# Patient Record
Sex: MALE | Race: WHITE | Employment: OTHER | URBAN - METROPOLITAN AREA
[De-identification: names, ages, dates, MRNs, and addresses within clinical notes are randomized per-mention and may not be internally consistent; named-entity substitution may affect disease eponyms.]

---

## 2017-01-05 ENCOUNTER — TELEPHONE (OUTPATIENT)
Dept: INTERNAL MEDICINE CLINIC | Facility: CLINIC | Age: 77
End: 2017-01-05

## 2017-01-05 DIAGNOSIS — Z85.828 PERSONAL HISTORY OF OTHER MALIGNANT NEOPLASM OF SKIN: ICD-10-CM

## 2017-01-05 DIAGNOSIS — I10 ESSENTIAL HYPERTENSION, BENIGN: Primary | ICD-10-CM

## 2017-01-05 DIAGNOSIS — I48.92 ATRIAL FLUTTER, PAROXYSMAL (HCC): ICD-10-CM

## 2017-01-05 DIAGNOSIS — E78.2 MIXED HYPERLIPIDEMIA: ICD-10-CM

## 2017-01-05 RX ORDER — METOPROLOL SUCCINATE 50 MG/1
TABLET, EXTENDED RELEASE ORAL
Qty: 90 TABLET | Refills: 1 | Status: SHIPPED | OUTPATIENT
Start: 2017-01-05 | End: 2017-06-29

## 2017-01-05 RX ORDER — ATORVASTATIN CALCIUM 10 MG/1
TABLET, FILM COATED ORAL
Qty: 90 TABLET | Refills: 1 | Status: SHIPPED | OUTPATIENT
Start: 2017-01-05 | End: 2017-06-29

## 2017-01-05 RX ORDER — RAMIPRIL 10 MG/1
CAPSULE ORAL
Qty: 90 CAPSULE | Refills: 1 | Status: SHIPPED | OUTPATIENT
Start: 2017-01-05 | End: 2017-06-29

## 2017-01-05 NOTE — TELEPHONE ENCOUNTER
Spoke with patient to clarify request as we sent a 90 day supply to his mail order. Patient states his insurance has changed and he never filled those. New rx for medications sent to [facility].    Advised to complete blood work prior to his appt in M

## 2017-05-10 ENCOUNTER — LAB ENCOUNTER (OUTPATIENT)
Dept: LAB | Age: 77
End: 2017-05-10
Attending: INTERNAL MEDICINE
Payer: MEDICARE

## 2017-05-10 DIAGNOSIS — I10 ESSENTIAL HYPERTENSION, BENIGN: ICD-10-CM

## 2017-05-10 DIAGNOSIS — Z85.828 PERSONAL HISTORY OF OTHER MALIGNANT NEOPLASM OF SKIN: ICD-10-CM

## 2017-05-10 DIAGNOSIS — I48.92 ATRIAL FLUTTER, PAROXYSMAL (HCC): ICD-10-CM

## 2017-05-10 DIAGNOSIS — E78.2 MIXED HYPERLIPIDEMIA: ICD-10-CM

## 2017-05-10 PROCEDURE — 36415 COLL VENOUS BLD VENIPUNCTURE: CPT | Performed by: INTERNAL MEDICINE

## 2017-05-15 ENCOUNTER — OFFICE VISIT (OUTPATIENT)
Dept: INTERNAL MEDICINE CLINIC | Facility: CLINIC | Age: 77
End: 2017-05-15

## 2017-05-15 ENCOUNTER — TELEPHONE (OUTPATIENT)
Dept: INTERNAL MEDICINE CLINIC | Facility: CLINIC | Age: 77
End: 2017-05-15

## 2017-05-15 VITALS
WEIGHT: 225 LBS | HEIGHT: 74.5 IN | HEART RATE: 76 BPM | BODY MASS INDEX: 28.57 KG/M2 | TEMPERATURE: 98 F | DIASTOLIC BLOOD PRESSURE: 70 MMHG | OXYGEN SATURATION: 98 % | SYSTOLIC BLOOD PRESSURE: 134 MMHG | RESPIRATION RATE: 16 BRPM

## 2017-05-15 DIAGNOSIS — S39.012A LUMBAR STRAIN, INITIAL ENCOUNTER: ICD-10-CM

## 2017-05-15 DIAGNOSIS — E78.2 MIXED HYPERLIPIDEMIA: ICD-10-CM

## 2017-05-15 DIAGNOSIS — I10 ESSENTIAL HYPERTENSION, BENIGN: Primary | ICD-10-CM

## 2017-05-15 PROCEDURE — 99214 OFFICE O/P EST MOD 30 MIN: CPT | Performed by: INTERNAL MEDICINE

## 2017-05-15 RX ORDER — CYCLOBENZAPRINE HCL 10 MG
10 TABLET ORAL NIGHTLY
Qty: 7 TABLET | Refills: 0 | Status: SHIPPED | OUTPATIENT
Start: 2017-05-15 | End: 2017-11-13

## 2017-05-15 NOTE — PATIENT INSTRUCTIONS
Facts About Dietary Fat     Olive oil is a good source of unsaturated fat. Eating less saturated and trans fat is one of the best things you can do for your heart. Start by finding out which fats are better to use.  Then always try to use as little \" © 9862-4514 [address], [address] [place]. All rights reserved. This information is not intended as a substitute for professional medical care. Always follow your healthcare professional's instructions.

## 2017-05-15 NOTE — PROGRESS NOTES
HPI:    Patient ID: Adelina Rebollar is a 64year old male. HTN  This is a chronic problem. The current episode started more than 1 year ago. The problem is controlled.  Pertinent negatives include no anxiety, blurred vision, chest pain, headaches, ma Respiratory: Negative for cough and shortness of breath. Cardiovascular: Negative for chest pain, palpitations, orthopnea and PND. Gastrointestinal: Negative for abdominal pain, diarrhea, constipation and bowel incontinence.    Genitourinary: Ruben Rivera and normal heart sounds. No murmur heard. Pulmonary/Chest: Effort normal and breath sounds normal. No respiratory distress. He has no wheezes. He has no rales. Abdominal: Soft. Bowel sounds are normal. He exhibits no distension.  There is no tendernes

## 2017-05-16 NOTE — TELEPHONE ENCOUNTER
Per Robbie Script: Cyclobenzaprine APPROVED from 2/15/17 to 5/16/18. Member DB:[id number].     Pharmacy informed and they confirmed script went through

## 2017-05-23 ENCOUNTER — TELEPHONE (OUTPATIENT)
Dept: INTERNAL MEDICINE CLINIC | Facility: CLINIC | Age: 77
End: 2017-05-23

## 2017-05-23 DIAGNOSIS — E78.2 MIXED HYPERLIPIDEMIA: Primary | ICD-10-CM

## 2017-05-23 DIAGNOSIS — I10 ESSENTIAL HYPERTENSION, BENIGN: ICD-10-CM

## 2017-06-12 ENCOUNTER — MED REC SCAN ONLY (OUTPATIENT)
Dept: INTERNAL MEDICINE CLINIC | Facility: CLINIC | Age: 77
End: 2017-06-12

## 2017-06-29 DIAGNOSIS — E78.2 MIXED HYPERLIPIDEMIA: ICD-10-CM

## 2017-06-29 DIAGNOSIS — I10 ESSENTIAL HYPERTENSION, BENIGN: ICD-10-CM

## 2017-06-29 DIAGNOSIS — Z85.828 PERSONAL HISTORY OF OTHER MALIGNANT NEOPLASM OF SKIN: ICD-10-CM

## 2017-06-29 DIAGNOSIS — I48.92 ATRIAL FLUTTER, PAROXYSMAL (HCC): ICD-10-CM

## 2017-06-29 RX ORDER — ATORVASTATIN CALCIUM 10 MG/1
TABLET, FILM COATED ORAL
Qty: 90 TABLET | Refills: 1 | Status: SHIPPED | OUTPATIENT
Start: 2017-06-29 | End: 2017-11-13

## 2017-06-29 RX ORDER — RAMIPRIL 10 MG/1
CAPSULE ORAL
Qty: 90 CAPSULE | Refills: 1 | Status: SHIPPED | OUTPATIENT
Start: 2017-06-29 | End: 2018-01-02

## 2017-06-29 RX ORDER — METOPROLOL SUCCINATE 50 MG/1
TABLET, EXTENDED RELEASE ORAL
Qty: 90 TABLET | Refills: 1 | Status: SHIPPED | OUTPATIENT
Start: 2017-06-29 | End: 2017-11-13

## 2017-06-29 RX ORDER — RAMIPRIL 10 MG/1
CAPSULE ORAL
Qty: 90 CAPSULE | Refills: 1 | Status: SHIPPED | OUTPATIENT
Start: 2017-06-29 | End: 2017-11-13

## 2017-06-29 RX ORDER — METOPROLOL SUCCINATE 50 MG/1
TABLET, EXTENDED RELEASE ORAL
Qty: 90 TABLET | Refills: 1 | Status: SHIPPED | OUTPATIENT
Start: 2017-06-29 | End: 2018-01-02

## 2017-06-29 RX ORDER — ATORVASTATIN CALCIUM 10 MG/1
TABLET, FILM COATED ORAL
Qty: 90 TABLET | Refills: 1 | Status: SHIPPED | OUTPATIENT
Start: 2017-06-29 | End: 2018-01-02

## 2017-06-29 NOTE — TELEPHONE ENCOUNTER
Refill 3 different medications -  Atorvastatin 10mg  Metoprolol 50mg  Ramipril 10mg   90 day supply of all 3  Send to [facility] on file

## 2017-10-19 PROBLEM — R35.1 NOCTURIA: Status: ACTIVE | Noted: 2017-10-19

## 2017-10-19 PROBLEM — R91.8 LUNG NODULES: Status: ACTIVE | Noted: 2017-10-19

## 2017-10-19 PROBLEM — N40.1 BPH WITH OBSTRUCTION/LOWER URINARY TRACT SYMPTOMS: Status: ACTIVE | Noted: 2017-10-19

## 2017-10-19 PROBLEM — N13.8 BPH WITH OBSTRUCTION/LOWER URINARY TRACT SYMPTOMS: Status: ACTIVE | Noted: 2017-10-19

## 2017-11-01 ENCOUNTER — LAB ENCOUNTER (OUTPATIENT)
Dept: LAB | Age: 77
End: 2017-11-01
Attending: INTERNAL MEDICINE
Payer: MEDICARE

## 2017-11-01 DIAGNOSIS — I10 ESSENTIAL (PRIMARY) HYPERTENSION: ICD-10-CM

## 2017-11-01 DIAGNOSIS — E78.2 MIXED HYPERLIPIDEMIA: Primary | ICD-10-CM

## 2017-11-01 PROCEDURE — 81001 URINALYSIS AUTO W/SCOPE: CPT

## 2017-11-01 PROCEDURE — 80061 LIPID PANEL: CPT

## 2017-11-01 PROCEDURE — 80053 COMPREHEN METABOLIC PANEL: CPT

## 2017-11-14 ENCOUNTER — OFFICE VISIT (OUTPATIENT)
Dept: INTERNAL MEDICINE CLINIC | Facility: CLINIC | Age: 77
End: 2017-11-14

## 2017-11-14 VITALS
RESPIRATION RATE: 16 BRPM | TEMPERATURE: 98 F | HEIGHT: 74.5 IN | OXYGEN SATURATION: 95 % | DIASTOLIC BLOOD PRESSURE: 80 MMHG | HEART RATE: 76 BPM | WEIGHT: 219.5 LBS | BODY MASS INDEX: 27.87 KG/M2 | SYSTOLIC BLOOD PRESSURE: 130 MMHG

## 2017-11-14 DIAGNOSIS — Z23 NEED FOR INFLUENZA VACCINATION: ICD-10-CM

## 2017-11-14 DIAGNOSIS — Z00.00 ENCOUNTER FOR ANNUAL HEALTH EXAMINATION: ICD-10-CM

## 2017-11-14 DIAGNOSIS — I25.10 ATHEROSCLEROSIS OF NATIVE CORONARY ARTERY OF NATIVE HEART WITHOUT ANGINA PECTORIS: ICD-10-CM

## 2017-11-14 DIAGNOSIS — I35.8 AORTIC VALVE SCLEROSIS: ICD-10-CM

## 2017-11-14 DIAGNOSIS — Z85.528 HISTORY OF RENAL CELL CARCINOMA: ICD-10-CM

## 2017-11-14 DIAGNOSIS — N40.1 BPH WITH OBSTRUCTION/LOWER URINARY TRACT SYMPTOMS: ICD-10-CM

## 2017-11-14 DIAGNOSIS — I48.92 ATRIAL FLUTTER, PAROXYSMAL (HCC): ICD-10-CM

## 2017-11-14 DIAGNOSIS — Z13.39 SCREENING FOR ALCOHOL PROBLEM: ICD-10-CM

## 2017-11-14 DIAGNOSIS — Z13.6 SCREENING FOR CARDIOVASCULAR CONDITION: ICD-10-CM

## 2017-11-14 DIAGNOSIS — N13.8 BPH WITH OBSTRUCTION/LOWER URINARY TRACT SYMPTOMS: ICD-10-CM

## 2017-11-14 DIAGNOSIS — R91.8 LUNG NODULES: ICD-10-CM

## 2017-11-14 DIAGNOSIS — I10 ESSENTIAL HYPERTENSION, BENIGN: ICD-10-CM

## 2017-11-14 DIAGNOSIS — E78.2 MIXED HYPERLIPIDEMIA: ICD-10-CM

## 2017-11-14 DIAGNOSIS — Z00.00 PHYSICAL EXAM, ANNUAL: Primary | ICD-10-CM

## 2017-11-14 DIAGNOSIS — Z13.31 DEPRESSION SCREENING: ICD-10-CM

## 2017-11-14 PROBLEM — R35.1 NOCTURIA: Status: RESOLVED | Noted: 2017-10-19 | Resolved: 2017-11-14

## 2017-11-14 PROCEDURE — G0008 ADMIN INFLUENZA VIRUS VAC: HCPCS | Performed by: INTERNAL MEDICINE

## 2017-11-14 PROCEDURE — G0446 INTENS BEHAVE THER CARDIO DX: HCPCS | Performed by: INTERNAL MEDICINE

## 2017-11-14 PROCEDURE — 90653 IIV ADJUVANT VACCINE IM: CPT | Performed by: INTERNAL MEDICINE

## 2017-11-14 PROCEDURE — G0444 DEPRESSION SCREEN ANNUAL: HCPCS | Performed by: INTERNAL MEDICINE

## 2017-11-14 PROCEDURE — G0439 PPPS, SUBSEQ VISIT: HCPCS | Performed by: INTERNAL MEDICINE

## 2017-11-14 NOTE — PROGRESS NOTES
HPI:   John Paul Bell is a 76year old male who presents for a Medicare Subsequent Annual Wellness visit (Pt already had Initial Annual Wellness).     HPI:  No complaints  Tolerating meds  Denies cp or sob  No abnormal bleed/bruising    PAST MEDICAL, S SL Tab Dissolve 1 tablet under the tongue every 5 minutes as  needed for chest pain   Cholecalciferol (VITAMIN D3) 2000 UNITS Oral Tab Take  by mouth daily. Coral Calcium 1000 (390 CA) MG Oral Tab Take 2,000 mg by mouth daily.    aspirin 81 MG Oral Tab Ta denies abdominal pain, denies heartburn  : 1 per night nocturia, no complaint of urinary incontinence  MUSCULOSKELETAL: denies back pain  NEURO: denies headaches  PSYCHE: denies depression or anxiety  HEMATOLOGIC: denies hx of anemia  ENDOCRINE: denies t deferred   Rectal: deferred   Extremities: Extremities normal, atraumatic, no cyanosis or edema   Pulses: 2+ and symmetric   Skin: Skin color, texture, turgor normal, no rashes or lesions   Lymph nodes: Cervical, supraclavicular nodes normal   Neurologic: Long term (current) use of anticoagulants- stable. Cont xarelto     Atherosclerosis of native coronary artery of native heart without angina pectoris- stable.  Cont control of CAD risk factors     History of renal cell carcinoma- per urology service     Mix Assessment          Have you fallen in the last 12 months?: 0-No                                        Fall/Risk Scorin          Depression Screening (PHQ-2/PHQ-9): Over the LAST 2 WEEKS   Little interest or pleasure in doing things (over the last two results found for: FOB No flowsheet data found. Glaucoma Screening      Ophthalmology Visit Annually: Diabetics, FHx Glaucoma, AA>50, > 65 No flowsheet data found.     Prostate Cancer Screening      PSA  Annually There are no preventive care suzi Value   04/16/2015 83     LDL Cholesterol (mg/dL)   Date Value   11/01/2017 84    No flowsheet data found. Dilated Eye exam  Annually No flowsheet data found. No flowsheet data found.     COPD      Spirometry Testing Annually Spirometry date:  No flows

## 2017-11-14 NOTE — PATIENT INSTRUCTIONS
Eating Heart-Healthy Foods  Eating has a big impact on your heart health. In fact, eating healthier can improve several of your heart risks at once. For instance, it helps you manage weight, cholesterol, and blood pressure.  Here are ideas to help you romeo Aim to make these foods staples of your diet. If you have diabetes, you may have different recommendations than what is listed here:  · Fruits and vegetable provide plenty of nutrients without a lot of calories.  At meals, fill half your plate with these fo · Choose ingredients that spice up your food without adding calories, fat, or sodium. Try these items: horseradish, hot sauce, lemon, mustard, nonfat salad dressings, and vinegar.  For salt-free herbs and spices, try basil, cilantro, cinnamon, pepper, and r 04/16/2015 151     Cholesterol, Total (mg/dL)   Date Value   11/01/2017 154     TRIGLYCERIDES (mg/dL)   Date Value   04/11/2014 125     Triglycerides (mg/dL)   Date Value   11/01/2017 102        EKG - covered if needed at Welcome to Medicare, and non-scree -FLU VAC NO PRSV 4 KAITLYNN 3 YRS+   Orders placed or performed in visit on 10/06/15  -FLU VAC NO PRSV 4 KIATLYNN 3 YRS+   Orders placed or performed in visit on 10/17/14  -FLU VACC [address] ANTIG   Orders placed or performed in visit on 10/11/13  -INFLUENZA VI A link to the [employer]. This site has a lot of good information including definitions of the different types of Advance Directives.  It also has the State forms available on it's website for anyone to review and print using their home

## 2017-11-20 ENCOUNTER — MED REC SCAN ONLY (OUTPATIENT)
Dept: INTERNAL MEDICINE CLINIC | Facility: CLINIC | Age: 77
End: 2017-11-20

## 2017-12-01 ENCOUNTER — TELEPHONE (OUTPATIENT)
Dept: INTERNAL MEDICINE CLINIC | Facility: CLINIC | Age: 77
End: 2017-12-01

## 2017-12-01 NOTE — TELEPHONE ENCOUNTER
Spoke with patient and he states he has been in PT for his left leg (thigh) and left hip. He has noticed some improvement but he's not 100% better yet.  States his therapist recommends xray of left hip and leg to be completed in a couple of weeks after 2-4

## 2017-12-01 NOTE — TELEPHONE ENCOUNTER
Pt is having therapy at [facility] on his leg, they suggest that he gets an xray , not progressing that well, athletic states that they would give a letter if needed, pt would like a call back

## 2017-12-15 ENCOUNTER — TELEPHONE (OUTPATIENT)
Dept: INTERNAL MEDICINE CLINIC | Facility: CLINIC | Age: 77
End: 2017-12-15

## 2017-12-15 NOTE — TELEPHONE ENCOUNTER
Per Dr. Jackson Prim refer to:   Reuben Dumas M.D. Dermatology   [address] [address] [address]   Phone: [phone]     Pt given referral information.

## 2017-12-15 NOTE — TELEPHONE ENCOUNTER
Pt left a voicemail saying Dr Cira Rahamn was recommended as a Dermatologist for him and his family, but he is leaving his practice so the pt wants to know who else Dr Mindy Villalobos would recommend?

## 2018-01-02 DIAGNOSIS — I10 ESSENTIAL HYPERTENSION, BENIGN: ICD-10-CM

## 2018-01-02 DIAGNOSIS — Z85.828 PERSONAL HISTORY OF OTHER MALIGNANT NEOPLASM OF SKIN: ICD-10-CM

## 2018-01-02 DIAGNOSIS — E78.2 MIXED HYPERLIPIDEMIA: ICD-10-CM

## 2018-01-02 DIAGNOSIS — I48.92 ATRIAL FLUTTER, PAROXYSMAL (HCC): ICD-10-CM

## 2018-01-02 RX ORDER — METOPROLOL SUCCINATE 50 MG/1
TABLET, EXTENDED RELEASE ORAL
Qty: 90 TABLET | Refills: 1 | Status: SHIPPED | OUTPATIENT
Start: 2018-01-02 | End: 2018-06-25

## 2018-01-02 RX ORDER — ATORVASTATIN CALCIUM 10 MG/1
TABLET, FILM COATED ORAL
Qty: 90 TABLET | Refills: 1 | Status: SHIPPED | OUTPATIENT
Start: 2018-01-02 | End: 2018-06-25

## 2018-01-02 RX ORDER — RAMIPRIL 10 MG/1
CAPSULE ORAL
Qty: 90 CAPSULE | Refills: 1 | Status: SHIPPED | OUTPATIENT
Start: 2018-01-02 | End: 2018-06-25

## 2018-01-09 ENCOUNTER — TELEPHONE (OUTPATIENT)
Dept: INTERNAL MEDICINE CLINIC | Facility: CLINIC | Age: 78
End: 2018-01-09

## 2018-01-09 DIAGNOSIS — M25.552 LEFT HIP PAIN: Primary | ICD-10-CM

## 2018-01-09 DIAGNOSIS — M79.652 LEFT THIGH PAIN: ICD-10-CM

## 2018-01-09 NOTE — TELEPHONE ENCOUNTER
Left message for pt to give the office a call back to discuss the PT recommendations in more detail.

## 2018-01-09 NOTE — TELEPHONE ENCOUNTER
VOICEMAIL: pt states he has been going to [address] for about 5w now, and they would like him to have an xray to see what is going on, call pt back

## 2018-01-09 NOTE — TELEPHONE ENCOUNTER
Pt reports has been going to PT for the last 5 weeks for left thigh pain. The PT has recommended having x-rays done of the area (the left thigh and the left hip) to better decide if the pain is possible radiating from the left hip.      Will discuss the rec

## 2018-01-11 ENCOUNTER — HOSPITAL ENCOUNTER (OUTPATIENT)
Dept: GENERAL RADIOLOGY | Age: 78
Discharge: HOME | End: 2018-01-11
Attending: INTERNAL MEDICINE
Payer: MEDICARE

## 2018-01-11 DIAGNOSIS — M79.652 LEFT THIGH PAIN: ICD-10-CM

## 2018-01-11 PROCEDURE — 73502 X-RAY EXAM HIP UNI 2-3 VIEWS: CPT | Performed by: INTERNAL MEDICINE

## 2018-05-08 ENCOUNTER — OFFICE VISIT (OUTPATIENT)
Dept: INTERNAL MEDICINE CLINIC | Facility: CLINIC | Age: 78
End: 2018-05-08

## 2018-05-08 ENCOUNTER — LAB ENCOUNTER (OUTPATIENT)
Dept: LAB | Age: 78
End: 2018-05-08
Attending: INTERNAL MEDICINE
Payer: MEDICARE

## 2018-05-08 VITALS
WEIGHT: 226 LBS | TEMPERATURE: 98 F | HEART RATE: 66 BPM | SYSTOLIC BLOOD PRESSURE: 110 MMHG | BODY MASS INDEX: 28.7 KG/M2 | RESPIRATION RATE: 16 BRPM | OXYGEN SATURATION: 96 % | DIASTOLIC BLOOD PRESSURE: 70 MMHG | HEIGHT: 74.5 IN

## 2018-05-08 DIAGNOSIS — I10 ESSENTIAL HYPERTENSION, BENIGN: ICD-10-CM

## 2018-05-08 DIAGNOSIS — E78.2 MIXED HYPERLIPIDEMIA: ICD-10-CM

## 2018-05-08 DIAGNOSIS — S39.012A STRAIN OF LUMBAR REGION, INITIAL ENCOUNTER: ICD-10-CM

## 2018-05-08 DIAGNOSIS — I10 ESSENTIAL HYPERTENSION, BENIGN: Primary | ICD-10-CM

## 2018-05-08 PROCEDURE — 36415 COLL VENOUS BLD VENIPUNCTURE: CPT

## 2018-05-08 PROCEDURE — 80053 COMPREHEN METABOLIC PANEL: CPT

## 2018-05-08 PROCEDURE — 99214 OFFICE O/P EST MOD 30 MIN: CPT | Performed by: INTERNAL MEDICINE

## 2018-05-08 PROCEDURE — 80061 LIPID PANEL: CPT

## 2018-05-08 RX ORDER — NAPROXEN 500 MG/1
500 TABLET ORAL 2 TIMES DAILY WITH MEALS
Qty: 30 TABLET | Refills: 0 | Status: SHIPPED | OUTPATIENT
Start: 2018-05-08 | End: 2018-05-30

## 2018-05-08 NOTE — PROGRESS NOTES
HPI:    Patient ID: Ronald Martínez is a 76year old male. HPI  HPI:   Ronald Martínez is a 76year old male who is here for complaints of back pain. Pain is located at right low back. Pain is described as dull, aching. Severity is moderate.  Katie Magdaleno Difficult intubation 7/14/93    Dr. Gema Santiago gave letter - will bring with him   • DVT (deep venous thrombosis) ([facility] 4/7/2014   • Earache     secondary to TMJ   • HEART ATTACK    • HIGH BLOOD PRESSURE    • HIGH CHOLESTEROL    • Hypercholesterolemia 4/7/2014 joints are affected    EXAM:   /70   Pulse 66   Temp (!) 97.5 °F (36.4 °C) (Oral)   Resp 16   Ht 74.5\"   Wt 226 lb   SpO2 96%   BMI 28.63 kg/m²    GENERAL: well developed, well nourished,in no apparent distress  SKIN: no rashes,no suspicious lesions Disp:  Rfl:    B Complex Vitamins (B COMPLEX 50 OR) Take  by mouth. Disp:  Rfl:    Omega-3 Fatty Acids (FISH OIL OR) Take 1,200 mg by mouth daily.  Disp:  Rfl:      Allergies:No Known Allergies   PHYSICAL EXAM:   Physical Exam           ASSESSMENT/PLAN:   S

## 2018-05-08 NOTE — PATIENT INSTRUCTIONS
Back Sprain or Strain    Injury to the muscles (strain) or ligaments (sprain) around the spine can be troubling.  Injury may occur after a sudden forceful twisting or bending force such as in a car accident, after a simple awkward movement, or after lifti · You can alternate the ice and heat. Talk with your healthcare provider to find out the best treatment or therapy for your back pain. · Therapeutic massage will help relax the back muscles without stretching them. · Be aware of safe lifting methods.  Do · Pain gets worse or spreads to your arms or legs  · Weakness or numbness in one or both arms or legs  · Numbness in the groin or genital area  Date Last Reviewed: 6/1/2016  © 3054-7606 [employer] [address]. [address] [facility], [address] [place] [address].

## 2018-06-20 ENCOUNTER — HOSPITAL ENCOUNTER (OUTPATIENT)
Dept: CV DIAGNOSTICS | Facility: HOSPITAL | Age: 78
Discharge: HOME | End: 2018-06-20
Attending: INTERNAL MEDICINE
Payer: MEDICARE

## 2018-06-20 DIAGNOSIS — Z79.01 LONG TERM (CURRENT) USE OF ANTICOAGULANTS: ICD-10-CM

## 2018-06-20 DIAGNOSIS — I10 ESSENTIAL HYPERTENSION, BENIGN: ICD-10-CM

## 2018-06-20 DIAGNOSIS — I25.10 ATHEROSCLEROSIS OF NATIVE CORONARY ARTERY OF NATIVE HEART WITHOUT ANGINA PECTORIS: ICD-10-CM

## 2018-06-20 DIAGNOSIS — I48.92 ATRIAL FLUTTER, PAROXYSMAL (HCC): ICD-10-CM

## 2018-06-20 DIAGNOSIS — I35.8 AORTIC VALVE SCLEROSIS: ICD-10-CM

## 2018-06-20 PROCEDURE — 93306 TTE W/DOPPLER COMPLETE: CPT | Performed by: INTERNAL MEDICINE

## 2018-06-25 DIAGNOSIS — Z85.828 PERSONAL HISTORY OF OTHER MALIGNANT NEOPLASM OF SKIN: ICD-10-CM

## 2018-06-25 DIAGNOSIS — I10 ESSENTIAL HYPERTENSION, BENIGN: ICD-10-CM

## 2018-06-25 DIAGNOSIS — I48.92 ATRIAL FLUTTER, PAROXYSMAL (HCC): ICD-10-CM

## 2018-06-25 DIAGNOSIS — E78.2 MIXED HYPERLIPIDEMIA: ICD-10-CM

## 2018-06-25 RX ORDER — ATORVASTATIN CALCIUM 10 MG/1
TABLET, FILM COATED ORAL
Qty: 90 TABLET | Refills: 1 | Status: SHIPPED | OUTPATIENT
Start: 2018-06-25 | End: 2018-12-20

## 2018-06-25 RX ORDER — METOPROLOL SUCCINATE 50 MG/1
TABLET, EXTENDED RELEASE ORAL
Qty: 90 TABLET | Refills: 1 | Status: SHIPPED | OUTPATIENT
Start: 2018-06-25 | End: 2018-12-20

## 2018-06-25 RX ORDER — RAMIPRIL 10 MG/1
CAPSULE ORAL
Qty: 90 CAPSULE | Refills: 1 | Status: ON HOLD | OUTPATIENT
Start: 2018-06-25 | End: 2018-09-29

## 2018-08-13 ENCOUNTER — TELEPHONE (OUTPATIENT)
Dept: INTERNAL MEDICINE CLINIC | Facility: CLINIC | Age: 78
End: 2018-08-13

## 2018-08-13 NOTE — TELEPHONE ENCOUNTER
Left message for Peg to give the office a call back. [facility] is requested a letter stating Dr. Cezar Basilio agrees with the test results but the results were not sent for review.

## 2018-08-13 NOTE — TELEPHONE ENCOUNTER
Fax came in requesting Dr. Jeannette Casiano approval on the state of the patient.  Put request in Triage Tray

## 2018-08-17 NOTE — TELEPHONE ENCOUNTER
2 calls with no return call from Peg. Fax sent informing Peg we need the detailed evaluation before letter can be written.

## 2018-09-25 ENCOUNTER — HOSPITAL ENCOUNTER (INPATIENT)
Facility: HOSPITAL | Age: 78
LOS: 2 days | Discharge: HOME | DRG: 699 | End: 2018-09-29
Attending: EMERGENCY MEDICINE | Admitting: HOSPITALIST
Payer: MEDICARE

## 2018-09-25 ENCOUNTER — APPOINTMENT (OUTPATIENT)
Dept: CT IMAGING | Facility: HOSPITAL | Age: 78
DRG: 699 | End: 2018-09-25
Attending: EMERGENCY MEDICINE
Payer: MEDICARE

## 2018-09-25 ENCOUNTER — APPOINTMENT (OUTPATIENT)
Dept: GENERAL RADIOLOGY | Facility: HOSPITAL | Age: 78
DRG: 699 | End: 2018-09-25
Attending: EMERGENCY MEDICINE
Payer: MEDICARE

## 2018-09-25 DIAGNOSIS — I16.9 HYPERTENSIVE CRISIS: Primary | ICD-10-CM

## 2018-09-25 DIAGNOSIS — R41.0 CONFUSION: ICD-10-CM

## 2018-09-25 PROCEDURE — 99223 1ST HOSP IP/OBS HIGH 75: CPT | Performed by: HOSPITALIST

## 2018-09-25 PROCEDURE — 71045 X-RAY EXAM CHEST 1 VIEW: CPT | Performed by: EMERGENCY MEDICINE

## 2018-09-25 PROCEDURE — 70450 CT HEAD/BRAIN W/O DYE: CPT | Performed by: EMERGENCY MEDICINE

## 2018-09-25 RX ORDER — HYDRALAZINE HYDROCHLORIDE 20 MG/ML
10 INJECTION INTRAMUSCULAR; INTRAVENOUS ONCE
Status: COMPLETED | OUTPATIENT
Start: 2018-09-25 | End: 2018-09-25

## 2018-09-26 ENCOUNTER — APPOINTMENT (OUTPATIENT)
Dept: GENERAL RADIOLOGY | Facility: HOSPITAL | Age: 78
DRG: 699 | End: 2018-09-26
Attending: HOSPITALIST
Payer: MEDICARE

## 2018-09-26 ENCOUNTER — APPOINTMENT (OUTPATIENT)
Dept: CV DIAGNOSTICS | Facility: HOSPITAL | Age: 78
DRG: 699 | End: 2018-09-26
Attending: HOSPITALIST
Payer: MEDICARE

## 2018-09-26 ENCOUNTER — APPOINTMENT (OUTPATIENT)
Dept: CT IMAGING | Facility: HOSPITAL | Age: 78
DRG: 699 | End: 2018-09-26
Attending: INTERNAL MEDICINE
Payer: MEDICARE

## 2018-09-26 PROBLEM — R41.0 CONFUSION: Status: ACTIVE | Noted: 2018-09-26

## 2018-09-26 PROCEDURE — 99232 SBSQ HOSP IP/OBS MODERATE 35: CPT | Performed by: HOSPITALIST

## 2018-09-26 PROCEDURE — 74178 CT ABD&PLV WO CNTR FLWD CNTR: CPT | Performed by: INTERNAL MEDICINE

## 2018-09-26 PROCEDURE — 73552 X-RAY EXAM OF FEMUR 2/>: CPT | Performed by: HOSPITALIST

## 2018-09-26 PROCEDURE — 99223 1ST HOSP IP/OBS HIGH 75: CPT | Performed by: OTHER

## 2018-09-26 PROCEDURE — 99223 1ST HOSP IP/OBS HIGH 75: CPT | Performed by: INTERNAL MEDICINE

## 2018-09-26 PROCEDURE — 93306 TTE W/DOPPLER COMPLETE: CPT | Performed by: HOSPITALIST

## 2018-09-26 RX ORDER — ASPIRIN 81 MG/1
81 TABLET, CHEWABLE ORAL DAILY
Status: DISCONTINUED | OUTPATIENT
Start: 2018-09-26 | End: 2018-09-26

## 2018-09-26 RX ORDER — FUROSEMIDE 10 MG/ML
20 INJECTION INTRAMUSCULAR; INTRAVENOUS ONCE
Status: COMPLETED | OUTPATIENT
Start: 2018-09-26 | End: 2018-09-26

## 2018-09-26 RX ORDER — ONDANSETRON 2 MG/ML
4 INJECTION INTRAMUSCULAR; INTRAVENOUS EVERY 6 HOURS PRN
Status: DISCONTINUED | OUTPATIENT
Start: 2018-09-26 | End: 2018-09-29

## 2018-09-26 RX ORDER — HYDRALAZINE HYDROCHLORIDE 20 MG/ML
10 INJECTION INTRAMUSCULAR; INTRAVENOUS EVERY 6 HOURS PRN
Status: DISCONTINUED | OUTPATIENT
Start: 2018-09-26 | End: 2018-09-29

## 2018-09-26 RX ORDER — ATORVASTATIN CALCIUM 10 MG/1
10 TABLET, FILM COATED ORAL
Status: DISCONTINUED | OUTPATIENT
Start: 2018-09-26 | End: 2018-09-26

## 2018-09-26 RX ORDER — HYDRALAZINE HYDROCHLORIDE 20 MG/ML
10 INJECTION INTRAMUSCULAR; INTRAVENOUS
Status: DISCONTINUED | OUTPATIENT
Start: 2018-09-26 | End: 2018-09-26

## 2018-09-26 RX ORDER — METOPROLOL SUCCINATE 25 MG/1
25 TABLET, EXTENDED RELEASE ORAL
Status: DISCONTINUED | OUTPATIENT
Start: 2018-09-26 | End: 2018-09-29

## 2018-09-26 RX ORDER — METOCLOPRAMIDE HYDROCHLORIDE 5 MG/ML
10 INJECTION INTRAMUSCULAR; INTRAVENOUS EVERY 8 HOURS PRN
Status: DISCONTINUED | OUTPATIENT
Start: 2018-09-26 | End: 2018-09-29

## 2018-09-26 RX ORDER — METOPROLOL SUCCINATE 50 MG/1
50 TABLET, EXTENDED RELEASE ORAL
Status: DISCONTINUED | OUTPATIENT
Start: 2018-09-26 | End: 2018-09-26

## 2018-09-26 RX ORDER — ASPIRIN 81 MG/1
81 TABLET, CHEWABLE ORAL DAILY
Status: DISCONTINUED | OUTPATIENT
Start: 2018-09-26 | End: 2018-09-29

## 2018-09-26 RX ORDER — RAMIPRIL 5 MG/1
10 CAPSULE ORAL
Status: DISCONTINUED | OUTPATIENT
Start: 2018-09-26 | End: 2018-09-26

## 2018-09-26 RX ORDER — HYDRALAZINE HYDROCHLORIDE 25 MG/1
25 TABLET, FILM COATED ORAL EVERY 8 HOURS SCHEDULED
Status: DISCONTINUED | OUTPATIENT
Start: 2018-09-26 | End: 2018-09-28

## 2018-09-26 RX ORDER — ATORVASTATIN CALCIUM 10 MG/1
10 TABLET, FILM COATED ORAL
Status: DISCONTINUED | OUTPATIENT
Start: 2018-09-26 | End: 2018-09-29

## 2018-09-26 RX ORDER — RAMIPRIL 5 MG/1
10 CAPSULE ORAL
Status: DISCONTINUED | OUTPATIENT
Start: 2018-09-26 | End: 2018-09-28

## 2018-09-26 RX ORDER — ACETAMINOPHEN 325 MG/1
650 TABLET ORAL EVERY 6 HOURS PRN
Status: DISCONTINUED | OUTPATIENT
Start: 2018-09-26 | End: 2018-09-29

## 2018-09-26 RX ORDER — TRAZODONE HYDROCHLORIDE 50 MG/1
50 TABLET ORAL NIGHTLY PRN
Status: DISCONTINUED | OUTPATIENT
Start: 2018-09-26 | End: 2018-09-29

## 2018-09-26 NOTE — ED INITIAL ASSESSMENT (HPI)
Per EMS, patient's wife called 46 for altered mental status. EMS states wife was with patient when at 6:30 pm, she noticed that patient was not responding to her. Currently, patient is A&O x 3; unsure of date, but knows month and year. Speaking clearly.  P

## 2018-09-26 NOTE — PROGRESS NOTES
[facility]PITAL[facility]  Progress Note     Jennyfer Juinor Patient Status:  Observation    1940 MRN [id number]   [facility] 8NE-A Attending Kirsten Mancera, [address] [facility] Day # 0 PCP Anthony Novak MD     Chief Complaint:  Increased sleepiness mg Oral 2x Daily(Beta Blocker)   • ramipril  10 mg Oral Daily   • atorvastatin  10 mg Oral Daily   • aspirin  81 mg Oral Daily       ASSESSMENT / PLAN:     1. Confusion/weakness-unclear etiology;-  1. Neuro to see  2. EEG  3. Ct reviewed  4. A1c 5.5   5.  P biopsy  3.  CT abd/pelvis ordered     Kevin Keys,

## 2018-09-26 NOTE — PROGRESS NOTES
09/26/18 0008 09/26/18 0022 09/26/18 0024   Vital Signs   Pulse 89 85 87   Heart Rate Source Monitor Monitor Monitor   Resp 18 --  --    BP (!) 165/84 (!) 174/88 (!) 172/87   BP Location Right arm Right arm Right arm   BP Method Automatic Automatic Auto

## 2018-09-26 NOTE — PROCEDURES
[address] EEG report    Name: Ky Fox    Date of Study: 9/26/2018      Routine EEG Report    Ordering Physician: Hanna Toussaint MD                           Primary Care Physician: Ning Hylton MD    Technical Asp noted, at times appearing more prominent over the left centroparietal region (C3/P3) and sharply contoured.     Sleep: Sleep stage 1 and 2 were noted, characterized by the appearance of vertex waves and sleep spindles, respectively    Hyperventilation and p

## 2018-09-26 NOTE — ED NOTES
Wife now here. She reports patient started \"acting strange\" at 6:30 pm. She reports patient started to \"do things that did not make sense. \" Wife reports patient has been sleeping more the past couple weeks, but would wake up and seem normal.

## 2018-09-26 NOTE — PROGRESS NOTES
[facility] [facility] Preliminary Note    Ky Fox Patient Status:  Observation    1940 MRN [id number]   [facility] 8NE-A Attending Imani Guillory, [address] [facility] Day # 0 PCP Ning Hylton MD     [address] unspecified type of vessel, native or graft    • Difficult intubation 7/14/93    Dr. Evelyn Kent gave letter - will bring with him   • DVT (deep venous thrombosis) ([facility] 4/7/2014   • Earache     secondary to TMJ   • HEART ATTACK    • HIGH BLOOD PRESSURE    • HIGH 50 MG Oral Tablet 24 Hr, Sig TAKE 0.5 TABLET BY MOUTH TWICE DAILY, Start Date 6/25/18, End Date , Taking? Yes, Authorizing Provider Mani Lord MD    Medication Coral Calcium 1000 (390 CA) MG Oral Tab, Sig Take 2,000 mg by mouth daily. , Start Date , End mg 10 mg Intravenous Q8H PRN   hydrALAzine HCl (APRESOLINE) injection 10 mg 10 mg Intravenous Q3H PRN   Metoprolol Succinate ER (Toprol XL) 24 hr tab 25 mg 25 mg Oral 2x Daily(Beta Blocker)   ramipril (ALTACE) cap 10 mg 10 mg Oral Daily   atorvastatin (LIP further evaluation. 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 Who presented for confusion.  HCT was negative for acute hemorrhage, with age indeter

## 2018-09-26 NOTE — PLAN OF CARE
METABOLIC/FLUID AND ELECTROLYTES - ADULT    • Hemodynamic stability and optimal renal function maintained Progressing        NEUROLOGICAL - ADULT    • Achieves stable or improved neurological status Progressing        Pt is AOX4 (Slow to respond), VSS, NSR

## 2018-09-26 NOTE — H&P
[facility] HOSPITALIST  History and Physical     [address] Way Patient Status:  Emergency    1940 MRN [id number]   Location [address] [facility] Attending Deisy Gibson MD   Hosp Day # 0 PCP Leatha Laird MD     Chief Complain Michelle Echevarria MD at 1404 [facility] OR  8-11-14: SKIN SURGERY; Right      Comment:  Excision for BCC, nodular to right upper back, KS  8/21/14: SKIN SURGERY;  Left      Comment:  MMS for BCC-nod to the L Nasal Supratip  9/16/16: SKIN SURGERY      Comment:  MMS for SCC to the HPI. Physical Exam:    BP (!) 167/95   Pulse 88   Temp 98.8 °F (37.1 °C) (Temporal)   Resp 16   Ht 6' 2\" (1.88 m)   Wt 220 lb (99.8 kg)   SpO2 96%   BMI 28.25 kg/m²   General: No acute distress. Alert and oriented x 3.   HEENT: Normocephalic atrauma to have nephrotic syndrome  5. Hx cad with cabg  6. Left thigh pain-check xray  7. Microscopic hematuria-consider urology consult; do not suspect UTI, but will send for culture just in case, as he has some equivocal urinary symptoms.     Quality:  · DVT Pro

## 2018-09-26 NOTE — CONSULTS
[facility]  Report of Consultation    Selene Back Patient Status:  Observation    1940 MRN [id number]   [facility] 8NE-A Attending Keny Oquendo, [address] [facility] Day # 0 PCP Randy Domínguez MD     Reason for Consultation:  Protein History:  1993: CABG      Comment:  triple bypass  No date: CATH PERCUTANEOUS  TRANSLUMINAL CORONARY ANGIOPLASTY      Comment:  early 1990's   No date: CHOLECYSTECTOMY  9/8/2004, 2011: COLONOSCOPY      Comment:  x2  No date: OPEN HEART SURGERY (PBP)  7/25/ lb 11.2 oz, SpO2 99 %. General: No acute distress. Alert and oriented x 3. HEENT: Moist mucous membranes. EOM-I. PERRL  Neck: No lymphadenopathy. No JVD. No carotid bruits. Respiratory: Clear to auscultation bilaterally. No wheezes. No rhonchi.   Gordo Marinelli 10/02/2017 1.00   09/20/2016 0.93         Imaging:  Reviewed    Impression:  1. Proteinuria- UA w/ sig microscopic hematuria+ proteinuria. Spot ratio as noted above is ~ 6g/g. Corrected. Modest protein gap and calcium is in ULN.   - check monoclonal pro

## 2018-09-26 NOTE — ED PROVIDER NOTES
Patient Seen in: [facility] Emergency Department    History   Patient presents with:  Altered Mental Status (neurologic)    Stated Complaint: ams    HPI    24-year-old male that comes the hospital chief complaint current wife of him being more confu early 1990's   No date: CHOLECYSTECTOMY  9/8/2004, 2011: COLONOSCOPY      Comment:  x2  No date: OPEN HEART SURGERY (PBP)  7/25/2014: ROBOT-ASSISTED LAPAROSCOPIC NEPHRECTOMY; Left      Comment:  Performed by Renetta Kelly MD at [facility] MAIN OR  8-11-14: SKIN URINALYSIS WITH CULTURE REFLEX - Abnormal; Notable for the following components:       Result Value    Clarity Urine Hazy (*)     Blood Urine Large (*)     Protein Urine >=500 (*)     WBC Urine 5-10 (*)     RBC URINE >10 (*)     Mucous Urine 2+ (*)     A ACR (American College of Radiology) NRDR ([address]) which includes the Dose Index Registry. PATIENT STATED HISTORY: (As transcribed by Technologist)  Patient with altered mental status today.     FINDINGS:  Ventricles and sulci are hydrALAzine HCl (APRESOLINE) injection 10 mg (10 mg Intravenous Given 9/25/18 2207)     Patient was observed department with marked improvement in his blood pressure      MDM   Patient be admitted to the hospital for further management for his hypertensi

## 2018-09-27 ENCOUNTER — APPOINTMENT (OUTPATIENT)
Dept: MRI IMAGING | Facility: HOSPITAL | Age: 78
DRG: 699 | End: 2018-09-27
Attending: Other
Payer: MEDICARE

## 2018-09-27 PROCEDURE — 99233 SBSQ HOSP IP/OBS HIGH 50: CPT | Performed by: OTHER

## 2018-09-27 PROCEDURE — 99232 SBSQ HOSP IP/OBS MODERATE 35: CPT | Performed by: HOSPITALIST

## 2018-09-27 PROCEDURE — 99233 SBSQ HOSP IP/OBS HIGH 50: CPT | Performed by: INTERNAL MEDICINE

## 2018-09-27 PROCEDURE — 70553 MRI BRAIN STEM W/O & W/DYE: CPT | Performed by: OTHER

## 2018-09-27 RX ORDER — FUROSEMIDE 10 MG/ML
40 INJECTION INTRAMUSCULAR; INTRAVENOUS
Status: DISCONTINUED | OUTPATIENT
Start: 2018-09-27 | End: 2018-09-28

## 2018-09-27 NOTE — CONSULTS
[facility] Urology   Consultation Note    Blanche Mendoza Patient Status:  Observation    1940 MRN [id number]   [facility] 8NE-A Attending Joyce Lucero, 160[address] [facility] Day # 0 PCP Greer Wong MD     Reason for Consultation: under treated epididymitis   • Unspecified essential hypertension    • Visual impairment     glasses     Past Surgical History:  1993: CABG      Comment:  triple bypass  No date: CATH PERCUTANEOUS  TRANSLUMINAL CORONARY ANGIOPLASTY      Comment:  early 80 are noted in HPI.     Physical Exam:  BP (!) 168/82 (BP Location: Left arm)   Pulse 78   Temp 97.6 °F (36.4 °C) (Oral)   Resp 18   Ht 6' 2\" (1.88 m)   Wt 226 lb (102.5 kg)   SpO2 98%   BMI 29.02 kg/m²   GENERAL: The patient is resting in bed in no acute di There is no pancreatic ductal  dilatation. SPLEEN: Spleen size is normal. There is an increasingly conspicuous 7.6 x 13.2 mm hypodense lesion  at the tip of the spleen.  Also at the tip of the spleen there is a subtle mixed attenuation focus  seen previous lower   pole right kidney. There is a stable 0.5 x 0.5 cm nodule mid pole left kidney. There is minimal central vascular calcification within the left renal hilum. No hydronephrosis. ADRENALS:  No mass or enlargement.     AORTA/VASCULAR:  Marked vascula (current) use of anticoagulants     Atherosclerosis of native coronary artery of native heart without angina pectoris     History of renal cell carcinoma     Mixed hyperlipidemia     Aortic valve sclerosis     BPH with obstruction/lower urinary tract sympt

## 2018-09-27 NOTE — IMAGING NOTE
Spoke to TABITHA KEENE [facility], rn about npo after midnight, hold asa and need pt/inr. Procedure will be tomorrow.

## 2018-09-27 NOTE — CONSULTS
[facility]  Report of Consultation    Alicia Lino Patient Status:  Inpatient    1940 MRN [id number]   [facility] 8NE-A Attending Caridad Shah, 160[address] [facility] Day # 0 PCP Jaycob Amos MD     Reason for [facility] ([facility]) premature arthrosclerotic heart disease     Medications:   Scheduled:   • furosemide  40 mg Intravenous BID (Diuretic)   • Metoprolol Succinate ER  25 mg Oral 2x Daily(Beta Blocker)   • ramipril  10 mg Oral Daily   • atorvastatin  10 mg Oral Daily   • aspi (102.5 kg)  09/27/18 : 226 lb (102.5 kg)  05/30/18 : 224 lb (101.6 kg)          General: Well developed, well nourished male. Pt is in no acute distress. HEENT:   Normocephalic. Atraumatic. Eyes with no scleral icterus. Neck: Supple. No JVD.   Carotid cavity size was increased. The estimated ejection     fraction was 50-55%. Doppler parameters are consistent with abnormal left     ventricular relaxation - grade 1 diastolic dysfunction.   2. Regional wall motion abnormality: Probable moderate hypokinesis

## 2018-09-27 NOTE — PROGRESS NOTES
[facility]  Progress Note     Chaz Round Patient Status:  Observation    1940 MRN [id number]   [facility] 8NE-A Attending Erika Bee, [address] [facility] Day # 0 PCP Jean Padilla MD     Chief Complaint:  Increased sleepiness in [address] [place] [address] Rd.     Medications:   • furosemide  40 mg Intravenous BID (Diuretic)   • Metoprolol Succinate ER  25 mg Oral 2x Daily(Beta Blocker)   • ramipril  10 mg Oral Daily   • atorvastatin  10 mg Oral Daily   • aspirin  81 mg Oral Daily   • hydrALAzine HCl  25

## 2018-09-27 NOTE — CONSULTS
[address] [facility] Initial Consultation    Blanche Mendoza Patient Status:  Observation    1940 MRN [id number]   [facility] 8NE-A Attending Joyce Lucero, [address] [facility] Day # 0 PCP Greer Wong MD     [address] ([facility]    • Bad breath     possibly d/t GERD   • Cholelithiasis    • Coronary atherosclerosis of unspecified type of vessel, native or graft    • Difficult intubation 7/14/93    Dr. Gema Santiago gave letter - will bring with him   • DVT (deep venous thrombo 9/18/18, End Date , Taking? Yes, Authorizing Provider Tommy Segundo MD    Medication METOPROLOL SUCCINATE ER 50 MG Oral Tablet 24 Hr, Sig TAKE 0.5 TABLET BY MOUTH TWICE DAILY, Start Date 6/25/18, End Date , Taking?  Yes, Authorizing Provider Luciano Sawyer PRN   ondansetron HCl (ZOFRAN) injection 4 mg 4 mg Intravenous Q6H PRN   Metoclopramide HCl (REGLAN) injection 10 mg 10 mg Intravenous Q8H PRN   Metoprolol Succinate ER (Toprol XL) 24 hr tab 25 mg 25 mg Oral 2x Daily(Beta Blocker)   ramipril (ALTACE) cap 1 ganglia. If there is clinical concern for acute ischemia/infarction, an MRI of the brain would be recommended for further evaluation. EEG:     Impression:    This EEG is mildly abnormal due to intermittent diffuse slowing, at times noted more prominently

## 2018-09-27 NOTE — PROGRESS NOTES
CARDIOVASCULAR - ADULT     • Maintains optimal cardiac output and hemodynamic stability Progressing     • Absence of cardiac arrhythmias or at baseline Progressing           METABOLIC/FLUID AND ELECTROLYTES - ADULT     • Hemodynamic stability and optimal r

## 2018-09-27 NOTE — PROGRESS NOTES
[address] [facility] Progress Note    Moe Car Patient Status:  Observation    1940 MRN [id number]   [facility] 8NE-A Attending Emerita Swenson, [address] [facility] Day # 0 PCP Yesica Man MD     CC: AMS    Subjective:   Will noted.    Assessment/Plan:  · AMS- possible ischemia vs PRES  · EEG showed mild diffuse slowing and asymmetry; no seizures  · MRI brain pending  · Echo showed worsening of inferior hypokinesis  · HTN- rate better controlled  · Left nephrectomy- urology fol oriented to person, place, time  Speech fluent and conversational though difficult to redirect at times    CN: PERRL, EOMI with no nystagmus, VFF, smile symmetric, sensation intact, tongue and palate midline, SCM intact, otherwise CN 2-12 intact   Motor: 5 Results   Component Value Date    ALT 24 09/25/2018    ALT 28 05/08/2018    ALT 30 11/01/2017          Assessment/Plan:      This is a [de-identified]year old male with a history of atrial flutter, DVT, hypertension, HLD, clear cell carcinoma s/p partial left nephrecto

## 2018-09-27 NOTE — PROGRESS NOTES
[facility]  Progress Note    Ed Passe Patient Status:  Observation    1940 MRN [id number]   [facility] 8NE-A Attending Marielos Rodriguez,    Hosp Day # 0 PCP Angel Luis Post MD     Confused overnight and this am  Refusing m 143   K  4.7  4.0   CL  109  110   CO2  22.0  25.0   BUN  31*  29*   CREATSERUM  1.13  1.05   CA  8.7  7.9*   MG   --   2.1       Recent Labs      09/25/18 2049   ALT  24   AST  35   ALB  2.5*       C3/C4 ; nl  Bence Jurado protein collection ongoing -pt

## 2018-09-28 ENCOUNTER — APPOINTMENT (OUTPATIENT)
Dept: CV DIAGNOSTICS | Facility: HOSPITAL | Age: 78
DRG: 699 | End: 2018-09-28
Attending: INTERNAL MEDICINE
Payer: MEDICARE

## 2018-09-28 ENCOUNTER — APPOINTMENT (OUTPATIENT)
Dept: CT IMAGING | Facility: HOSPITAL | Age: 78
DRG: 699 | End: 2018-09-28
Attending: NURSE PRACTITIONER
Payer: MEDICARE

## 2018-09-28 ENCOUNTER — APPOINTMENT (OUTPATIENT)
Dept: CT IMAGING | Facility: HOSPITAL | Age: 78
DRG: 699 | End: 2018-09-28
Attending: INTERNAL MEDICINE
Payer: MEDICARE

## 2018-09-28 PROCEDURE — 99233 SBSQ HOSP IP/OBS HIGH 50: CPT | Performed by: INTERNAL MEDICINE

## 2018-09-28 PROCEDURE — 99233 SBSQ HOSP IP/OBS HIGH 50: CPT | Performed by: OTHER

## 2018-09-28 PROCEDURE — 70450 CT HEAD/BRAIN W/O DYE: CPT | Performed by: NURSE PRACTITIONER

## 2018-09-28 PROCEDURE — 99232 SBSQ HOSP IP/OBS MODERATE 35: CPT | Performed by: HOSPITALIST

## 2018-09-28 PROCEDURE — 95819 EEG AWAKE AND ASLEEP: CPT | Performed by: OTHER

## 2018-09-28 RX ORDER — NALOXONE HYDROCHLORIDE 0.4 MG/ML
80 INJECTION, SOLUTION INTRAMUSCULAR; INTRAVENOUS; SUBCUTANEOUS AS NEEDED
Status: CANCELLED | OUTPATIENT
Start: 2018-09-28

## 2018-09-28 RX ORDER — FLUMAZENIL 0.1 MG/ML
0.2 INJECTION, SOLUTION INTRAVENOUS AS NEEDED
Status: CANCELLED | OUTPATIENT
Start: 2018-09-28

## 2018-09-28 RX ORDER — SODIUM CHLORIDE 9 MG/ML
INJECTION, SOLUTION INTRAVENOUS CONTINUOUS
Status: CANCELLED | OUTPATIENT
Start: 2018-09-28

## 2018-09-28 RX ORDER — MIDAZOLAM HYDROCHLORIDE 1 MG/ML
1 INJECTION INTRAMUSCULAR; INTRAVENOUS EVERY 5 MIN PRN
Status: CANCELLED | OUTPATIENT
Start: 2018-09-28 | End: 2018-09-28

## 2018-09-28 RX ORDER — HYDRALAZINE HYDROCHLORIDE 50 MG/1
50 TABLET, FILM COATED ORAL EVERY 8 HOURS SCHEDULED
Status: DISCONTINUED | OUTPATIENT
Start: 2018-09-28 | End: 2018-09-29

## 2018-09-28 RX ORDER — RAMIPRIL 5 MG/1
10 CAPSULE ORAL 2 TIMES DAILY
Status: DISCONTINUED | OUTPATIENT
Start: 2018-09-28 | End: 2018-09-29

## 2018-09-28 RX ORDER — FUROSEMIDE 10 MG/ML
40 INJECTION INTRAMUSCULAR; INTRAVENOUS DAILY
Status: DISCONTINUED | OUTPATIENT
Start: 2018-09-29 | End: 2018-09-29

## 2018-09-28 NOTE — IMAGING NOTE
Resting scan and completed,  Rapid response called prior to stress scan. Dr. Tony Coker notified via [phone] phone. Codie notified and asked to call if patient became stable enough to return for stress scan. Nuclear Stress Test not completed.

## 2018-09-28 NOTE — PROGRESS NOTES
[facility]PITAL[facility]  Progress Note     Alicia Lino Patient Status:  Observation    1940 MRN [id number]   [facility] 8NE-A Attending Caridad Shah, [address] [facility] Day # 1 PCP Jaycob Amos MD     Chief Complaint:  Increased sleepiness mg/dL). Recent Labs   Lab  09/25/18   2049  09/27/18   1122  09/28/18   0513   PTP  17.2*  15.2*  14.9*   INR  1.35*  1.15*  1.12*       No results for input(s): TROP, CK in the last 168 hours. Imaging: Imaging data reviewed in Epic.     Court Escamilla

## 2018-09-28 NOTE — PROGRESS NOTES
Cards    FU: CAD    Went for lexiscan nuclear given wall motion abnormality (but previous IMI). Evidently had speech abnormalities. CT negative. Neuro wants repeat EEG. MRI last night negative. Doing better now. No complaints.     No CP, D Speech issues during stress test: Presently close to baseline by my exam.  No musculoskeletal deficits. Speech normal.  Appears fatigued to me. Neurology following. Defer EEG/MRI to them. Follow. 5. HTN: Recent BP elevated.   Increase hydralizine to

## 2018-09-28 NOTE — IMAGING NOTE
Talked to Dr Carole Horn and updated that pt had a code stroke called and was slurring his words when lowering his blood pressure. Per Dr Carole Horn order for biopsy to be cancelled and will revisit this at a later date. RN notified.

## 2018-09-28 NOTE — PROGRESS NOTES
[facility]  Progress Note    Marylene Ghazi Patient Status:  Observation    1940 MRN [id number]   [facility] 8NE-A Attending Darlene Cardona, [address] [facility] [address] Day # 1 PCP Richard Mojica MD     No acute issues overnight  BP higher overn 1.12*       Recent Labs      09/25/18 2049 09/26/18   0511  09/27/18   1001  09/28/18   0513   NA  140  143  141  143   K  4.7  4.0  4.0  3.9   CL  109  110  109  110   CO2  22.0  25.0  23.0  26.0   BUN  31*  29*  25*  23*   CREATSERUM  1.13  1.05  1.0

## 2018-09-28 NOTE — PROGRESS NOTES
CARDIODIAGNOSTICS PRELIMINARY REPORT    Lexiscan Nuclear Stress Test    Patient denied any chest discomfort with Lexiscan infusion. Frequent isolated PVC's noted throughout. Ventricular bigeminy at times. Nuclear images pending.     Carlos Jackson RN ~ Gordo Marinelli

## 2018-09-28 NOTE — PROGRESS NOTES
Patient in cardiodiagnostics department for nuclear stress test. Patient resting in chair after stress portion of exam. At approximately 1100, nuclear technologist noticed patient was not speaking clear and called RN.  Upon assessment, speech was garbled an

## 2018-09-28 NOTE — PROGRESS NOTES
[id number] [facility] Neu[facility] Progress Note    Nnamdi Guzmán Patient Status:  Inpatient    1940 MRN [id number]   [facility] 8NE-A Attending Briseyda Martinez, [address] [facility] Day # 1 PCP Graham Morris MD         Subjective:   Milton ANDRADE Con ramipril  10 mg Oral Daily   • atorvastatin  10 mg Oral Daily   • aspirin  81 mg Oral Daily   • hydrALAzine HCl  25 mg Oral Q8H [address]       Patient Active Problem List:     Essential hypertension, benign     Atrial flutter, paroxysmal ([address]     Long term (cu oriented x 3  CN: PERRL, EOMI, face sensation is intact, face symmetric, tongue midline, shrug intact  MSK: 5/5 strength throughout  Sens: Intact to LT throughout  DTRs: 2+ throughout    A:  This is an [de-identified] y/o male with Aflutter, HTN, DVT and cl;ear cell CA

## 2018-09-28 NOTE — IMAGING NOTE
Spoke to RK Pardo about pt bp elevated requested meds to be given prior to renal biopsy. Char stated understanding.

## 2018-09-28 NOTE — PROCEDURES
[facility] - ELECTROENCEPHALOGRAM (EEG) REPORT  Patient Name:  Miracle Feliciano   MRN / CSN:  [id number] / [id number]   Date of Birth / Age:  6/24/1940 /  [de-identified]year old   Encounter Date:  09/28/18         METHODS:  Twenty-two electrodes were applied according to t epileptiform activity seen and no seizures are recorded.      Report covers  Start 09/28/18 at   End 09/28/18 at [address]:  Angelo Wiggins [address] Neurology

## 2018-09-29 VITALS
WEIGHT: 222 LBS | OXYGEN SATURATION: 93 % | SYSTOLIC BLOOD PRESSURE: 126 MMHG | HEIGHT: 74 IN | HEART RATE: 77 BPM | DIASTOLIC BLOOD PRESSURE: 61 MMHG | TEMPERATURE: 99 F | RESPIRATION RATE: 20 BRPM | BODY MASS INDEX: 28.49 KG/M2

## 2018-09-29 PROCEDURE — 99232 SBSQ HOSP IP/OBS MODERATE 35: CPT | Performed by: INTERNAL MEDICINE

## 2018-09-29 PROCEDURE — 99232 SBSQ HOSP IP/OBS MODERATE 35: CPT | Performed by: NURSE PRACTITIONER

## 2018-09-29 PROCEDURE — 99239 HOSP IP/OBS DSCHRG MGMT >30: CPT | Performed by: HOSPITALIST

## 2018-09-29 RX ORDER — LISINOPRIL 20 MG/1
20 TABLET ORAL 2 TIMES DAILY
Qty: 60 TABLET | Refills: 0 | Status: SHIPPED | OUTPATIENT
Start: 2018-09-29 | End: 2018-09-29

## 2018-09-29 RX ORDER — FUROSEMIDE 40 MG/1
40 TABLET ORAL DAILY
Status: DISCONTINUED | OUTPATIENT
Start: 2018-09-29 | End: 2018-09-29

## 2018-09-29 RX ORDER — FUROSEMIDE 40 MG/1
40 TABLET ORAL DAILY
Qty: 30 TABLET | Refills: 3 | Status: SHIPPED | OUTPATIENT
Start: 2018-09-30 | End: 2018-11-12

## 2018-09-29 RX ORDER — RAMIPRIL 10 MG/1
20 CAPSULE ORAL 2 TIMES DAILY
Qty: 120 CAPSULE | Refills: 0 | Status: SHIPPED | OUTPATIENT
Start: 2018-09-29 | End: 2018-09-29

## 2018-09-29 RX ORDER — HYDRALAZINE HYDROCHLORIDE 50 MG/1
50 TABLET, FILM COATED ORAL EVERY 8 HOURS SCHEDULED
Qty: 90 TABLET | Refills: 0 | Status: SHIPPED | OUTPATIENT
Start: 2018-09-29 | End: 2018-09-29

## 2018-09-29 RX ORDER — HYDRALAZINE HYDROCHLORIDE 50 MG/1
50 TABLET, FILM COATED ORAL EVERY 8 HOURS SCHEDULED
Qty: 90 TABLET | Refills: 0 | Status: SHIPPED | OUTPATIENT
Start: 2018-09-29 | End: 2018-10-03

## 2018-09-29 RX ORDER — LISINOPRIL 20 MG/1
20 TABLET ORAL 2 TIMES DAILY
Qty: 60 TABLET | Refills: 0 | Status: SHIPPED | OUTPATIENT
Start: 2018-09-29 | End: 2018-10-19

## 2018-09-29 RX ORDER — RAMIPRIL 5 MG/1
20 CAPSULE ORAL 2 TIMES DAILY
Status: DISCONTINUED | OUTPATIENT
Start: 2018-09-29 | End: 2018-09-29

## 2018-09-29 RX ORDER — HYDRALAZINE HYDROCHLORIDE 50 MG/1
100 TABLET, FILM COATED ORAL EVERY 8 HOURS SCHEDULED
Status: DISCONTINUED | OUTPATIENT
Start: 2018-09-29 | End: 2018-09-29

## 2018-09-29 NOTE — PLAN OF CARE
Assumed care for patient at 0730 on 9/29. A&O X4. SBP 150s-170s. Ramipril increased. Other VSS on RA. SR - ST. . Lungs clear/ diminished. No pain. SCDs for VTE prophylaxis. Xarelto resumed at discharge. General diet.  Daily weight (2 lb weight

## 2018-09-29 NOTE — PROGRESS NOTES
[facility] Cardiology/[facility]  Progress Note    Radha Amaro Patient Status:  Inpatient    1940 MRN [id number]   [facility] 8NE-A Attending Santi Obrien, [address] [facility] Day # 2 PCP Molly Cheadle, MD        A/P: [de-identified]year old with CAD bruits. Cardiac: Regular rate and rhythm, S1, S2 normal, no murmur, rub or gallop. Lungs: Clear without wheezes, rales, rhonchi. Abdomen: Soft, non-tender. Extremities: Without clubbing, cyanosis or edema. Peripheral pulses present  Neurologic:  Al Metoprolol Succinate ER (Toprol XL) 24 hr tab 25 mg 25 mg Oral 2x Daily(Beta Blocker)   atorvastatin (LIPITOR) tab 10 mg 10 mg Oral Daily   aspirin chewable tab 81 mg 81 mg Oral Daily   hydrALAzine HCl (APRESOLINE) injection 10 mg 10 mg Intravenous Q6H P

## 2018-09-29 NOTE — PROGRESS NOTES
[id number] [facility] Neu[facility] Progress Note    Enmanuel Jasmine Patient Status:  Inpatient    1940 MRN [id number]   [facility] 8NE-A Attending Watson Salgado, [address] [facility] Day # 2 PCP Stef Enriquez MD         Subjective:   Rory ANDRADE Con Daily(Beta Blocker)   • atorvastatin  10 mg Oral Daily   • aspirin  81 mg Oral Daily       Patient Active Problem List:     Essential hypertension, benign     Atrial flutter, paroxysmal (HCC)     Syncope     Long term (current) use of anticoagulants     Co

## 2018-09-29 NOTE — DISCHARGE SUMMARY
[facility] [place] [facility]PITAL[facility]  DISCHARGE SUMMARY     John Paul Bell Patient Status:  Inpatient    1940 MRN [id number]   [facility] 8NE-A Attending No att. providers found   Hosp Day # 2 PCP Juancho Feliciano MD     Date of Admission: 2018 findings and recommendations (brief descriptions):  • None    Lab/Test results pending at Discharge:   · SPEP  · Bence-Jurado protein     Consultants:  • Cardiology  · Nephrology  · Neurology  · Urology     Discharge Medication List:     Discharge Medicatio MG Caps  Commonly known as:  ALTACE              Where to Get Your Medications      Please  your prescriptions at the location directed by your doctor or nurse    Bring a paper prescription for each of these medications  · furosemide 40 MG Tabs  · h

## 2018-09-29 NOTE — PROGRESS NOTES
[facility]  Progress Note     Audrey Yap Patient Status:  Observation    1940 MRN [id number]   [facility] 8NE-A Attending Tom Wright, [address] [facility] Day # 2 PCP Gauri Centeno MD     Chief Complaint:  Increased sleepiness Clearance: 54.4 mL/min (based on SCr of 1.26 mg/dL). Recent Labs   Lab  09/25/18 2049  09/27/18   1122  09/28/18   0513   PTP  17.2*  15.2*  14.9*   INR  1.35*  1.15*  1.12*       No results for input(s): TROP, CK in the last 168 hours.          Imagin

## 2018-09-29 NOTE — PROGRESS NOTES
[facility]  Nephrology Progress Note    Ori Bernal Patient Status:  Inpatient    1940 MRN [id number]   [facility] 8NE-A Attending Gina Bermudez, [address] [facility] Day # 2 PCP Luis Angel Lagunas MD       SUBJECTIVE:  Events of yesterda 7. 9*  8.3  7.7*  8.1*   MG   --   2.1  2.3  2.1  2.2   GLU  77  95  107*  96  104*       Recent Labs   Lab  09/25/18 2049   ALT  24   AST  35   ALB  2.5*       Recent Labs   Lab  09/25/18 2022   PGLU  88       Meds:     Current Facility-Administered M

## 2018-10-01 ENCOUNTER — PATIENT OUTREACH (OUTPATIENT)
Dept: CASE MANAGEMENT | Age: 78
End: 2018-10-01

## 2018-10-01 DIAGNOSIS — Z02.9 ENCOUNTERS FOR ADMINISTRATIVE PURPOSE: ICD-10-CM

## 2018-10-01 PROCEDURE — 1111F DSCHRG MED/CURRENT MED MERGE: CPT

## 2018-10-01 NOTE — PROGRESS NOTES
Initial Post Discharge Follow Up   Discharge Date: 9/29/18  Contact Date: 10/1/2018    Consent Verification:  Assessment Completed With: Spouse: Dc Edwards received per patient?  written  HIPAA Verified?   Yes    Discharge Dx:    Transient confusio daily. Disp: 30 tablet Rfl: 3   ketoconazole 2 % External Cream Apply to AA BID x 2 weeks, hold one week, restart prn Disp: 60 g Rfl: 3   METOPROLOL SUCCINATE ER 50 MG Oral Tablet 24 Hr TAKE 0.5 TABLET BY MOUTH TWICE DAILY Disp: 90 tablet Rfl: 1   ATORVAST fluid restrictions? Winferd All does not recall. NCM explained it is typically 2 Liters per day  Have you noticed any shortness of breath or waking up short of breath?  no  Since discharge do you feel you are urinating more or less?  more    Are you urinating mor appointment with pt:  Yes, NCM confirmed TCM HFU on 10/3/18. Have you made all of your follow up appointments? no    Is there any reason as to why you cannot make your appointments?    No     NCM Reviewed upcoming Specialist Appt with patient     Yes,

## 2018-10-03 ENCOUNTER — OFFICE VISIT (OUTPATIENT)
Dept: INTERNAL MEDICINE CLINIC | Facility: CLINIC | Age: 78
End: 2018-10-03
Payer: MEDICARE

## 2018-10-03 VITALS
BODY MASS INDEX: 28.44 KG/M2 | HEIGHT: 74.5 IN | RESPIRATION RATE: 16 BRPM | TEMPERATURE: 98 F | DIASTOLIC BLOOD PRESSURE: 70 MMHG | SYSTOLIC BLOOD PRESSURE: 152 MMHG | WEIGHT: 224 LBS | HEART RATE: 80 BPM

## 2018-10-03 DIAGNOSIS — N04.9 NEPHROTIC SYNDROME: ICD-10-CM

## 2018-10-03 DIAGNOSIS — I16.9 HYPERTENSIVE CRISIS: ICD-10-CM

## 2018-10-03 DIAGNOSIS — R41.0 CONFUSION: ICD-10-CM

## 2018-10-03 DIAGNOSIS — Z09 HOSPITAL DISCHARGE FOLLOW-UP: Primary | ICD-10-CM

## 2018-10-03 DIAGNOSIS — Z23 NEED FOR INFLUENZA VACCINATION: ICD-10-CM

## 2018-10-03 PROCEDURE — 1111F DSCHRG MED/CURRENT MED MERGE: CPT | Performed by: INTERNAL MEDICINE

## 2018-10-03 PROCEDURE — G0008 ADMIN INFLUENZA VIRUS VAC: HCPCS | Performed by: INTERNAL MEDICINE

## 2018-10-03 PROCEDURE — 90653 IIV ADJUVANT VACCINE IM: CPT | Performed by: INTERNAL MEDICINE

## 2018-10-03 PROCEDURE — 99495 TRANSJ CARE MGMT MOD F2F 14D: CPT | Performed by: INTERNAL MEDICINE

## 2018-10-03 RX ORDER — HYDRALAZINE HYDROCHLORIDE 50 MG/1
TABLET, FILM COATED ORAL
Qty: 90 TABLET | Refills: 0 | COMMUNITY
Start: 2018-10-03 | End: 2018-10-19

## 2018-10-03 NOTE — PROGRESS NOTES
HPI:    Benjamin Pruitt is a [de-identified]year old male here today for hospital follow up.    He was discharged from Inpatient hospital, [facility] to Home   Admission Date: 9/25/18   Discharge Date: 9/29/18  Hospital Discharge Diagnoses (since 9/3/2018) Glomerulonephritis suspected. Patient is planned to undergo renal biopsy in the future. They have not scheduled. Echo revealed wall motion abnormality. Cardiology consulted.  Stress test unable to be completed to to transient speech difficulty during stress Difficult intubation (7/14/93), DVT (deep venous thrombosis) ([facility] [address] (4/7/2014), Earache, HEART ATTACK, HIGH BLOOD PRESSURE, HIGH CHOLESTEROL, Hypercholesterolemia (4/7/2014), Long term (current) use of anticoagulants (4/7/2014), Other and unspecified hyperl nontender, nondistended.  Positive bowel sounds. .  Neurologic: No focal neurological deficits. Musculoskeletal: Moves all extremities. Extremities: No edema or cyanosis. Integument: No rashes or lesions.    Psychiatric: Appropriate mood and affect.

## 2018-10-03 NOTE — PATIENT INSTRUCTIONS
Acute Glomerulonephritis    The kidneys remove waste products and extra fluid from the blood.  Glomerulonephritis (GN) is an inflammation of the kidney that affects how well the kidneys filter blood. This condition may be acute (lasting weeks to months) o Follow up with your healthcare provider, or as advised. The following resources may be helpful:  · American Association of Kidney Patients [phone] www. aakp.org  · [place] [phone] www.kidney. org  Call 911  Call 911 if you have a

## 2018-10-12 ENCOUNTER — OFFICE VISIT (OUTPATIENT)
Dept: NEPHROLOGY | Facility: CLINIC | Age: 78
End: 2018-10-12
Payer: MEDICARE

## 2018-10-12 VITALS — WEIGHT: 217 LBS | BODY MASS INDEX: 27 KG/M2 | SYSTOLIC BLOOD PRESSURE: 126 MMHG | DIASTOLIC BLOOD PRESSURE: 88 MMHG

## 2018-10-12 DIAGNOSIS — I10 ESSENTIAL HYPERTENSION: Primary | ICD-10-CM

## 2018-10-12 DIAGNOSIS — R80.9 PROTEINURIA, UNSPECIFIED TYPE: ICD-10-CM

## 2018-10-12 PROCEDURE — 99215 OFFICE O/P EST HI 40 MIN: CPT | Performed by: INTERNAL MEDICINE

## 2018-10-12 NOTE — PROGRESS NOTES
Nephrology Progress Note      Rafael Toribio is a [de-identified]year old male.     HPI:   Patient presents with:  Proteinuria  Hypertension      [de-identified] y/o male with hx of CAD, GERD, DVT, HTN, hx of Renal cell cancer s/p partial L nephrectomy who seen in hospital las likely from under treated epididymitis   • Unspecified essential hypertension    • Visual impairment     glasses      Past Surgical History:   Procedure Laterality Date   • CABG  1993    triple bypass   • CATH PERCUTANEOUS  TRANSLUMINAL CORONARY ANGIOPL mg by mouth daily. Disp:  Rfl:    Omega-3 Fatty Acids (FISH OIL OR) Take 1,200 mg by mouth daily. Disp:  Rfl:    Cholecalciferol (VITAMIN D3) 2000 UNITS Oral Tab Take  by mouth daily.  Disp:  Rfl:    Coral Calcium 1000 (390 CA) MG Oral Tab Take 2,000 mg by

## 2018-10-18 NOTE — IMAGING NOTE
Pt called back and reported that his last doses of Xarelto and ASA 81mg was 10/16 and that this the direction given to him by Dr Fawn Lucero who was getting approval from pt's cardiologist Dr Carmen Leal.

## 2018-10-19 ENCOUNTER — OFFICE VISIT (OUTPATIENT)
Dept: INTERNAL MEDICINE CLINIC | Facility: CLINIC | Age: 78
End: 2018-10-19
Payer: MEDICARE

## 2018-10-19 VITALS
TEMPERATURE: 98 F | HEIGHT: 74 IN | WEIGHT: 216 LBS | DIASTOLIC BLOOD PRESSURE: 86 MMHG | BODY MASS INDEX: 27.72 KG/M2 | RESPIRATION RATE: 16 BRPM | SYSTOLIC BLOOD PRESSURE: 128 MMHG | HEART RATE: 82 BPM

## 2018-10-19 DIAGNOSIS — I10 ESSENTIAL HYPERTENSION: Primary | ICD-10-CM

## 2018-10-19 PROBLEM — I16.9 HYPERTENSIVE CRISIS: Status: RESOLVED | Noted: 2018-09-25 | Resolved: 2018-10-19

## 2018-10-19 PROBLEM — I71.40 ABDOMINAL AORTIC ANEURYSM (AAA) WITHOUT RUPTURE (HCC): Status: ACTIVE | Noted: 2018-10-19

## 2018-10-19 PROBLEM — I71.40 ABDOMINAL AORTIC ANEURYSM (AAA) WITHOUT RUPTURE: Status: ACTIVE | Noted: 2018-10-19

## 2018-10-19 PROBLEM — I71.4 ABDOMINAL AORTIC ANEURYSM (AAA) WITHOUT RUPTURE (HCC): Status: ACTIVE | Noted: 2018-10-19

## 2018-10-19 PROCEDURE — 99213 OFFICE O/P EST LOW 20 MIN: CPT | Performed by: INTERNAL MEDICINE

## 2018-10-19 RX ORDER — HYDRALAZINE HYDROCHLORIDE 50 MG/1
TABLET, FILM COATED ORAL
Qty: 270 TABLET | Refills: 1 | Status: SHIPPED | OUTPATIENT
Start: 2018-10-19 | End: 2019-04-08

## 2018-10-19 RX ORDER — LISINOPRIL 20 MG/1
20 TABLET ORAL 2 TIMES DAILY
Qty: 180 TABLET | Refills: 1 | Status: SHIPPED | OUTPATIENT
Start: 2018-10-19 | End: 2019-04-08

## 2018-10-19 NOTE — PROGRESS NOTES
HPI:    Patient ID: Catherine Fernando is a [de-identified]year old male. HPI  Catherine Fernando is a [de-identified]year old male. HPI:   Patient presents for recheck of his hypertension.  Pt has been taking medications as instructed, no medication side effects, home BP daily Disp: 30 g Rfl: 0   furosemide 40 MG Oral Tab Take 1 tablet (40 mg total) by mouth daily.  Disp: 30 tablet Rfl: 3   METOPROLOL SUCCINATE ER 50 MG Oral Tablet 24 Hr TAKE 0.5 TABLET BY MOUTH TWICE DAILY Disp: 90 tablet Rfl: 1   ATORVASTATIN 10 MG Oral T NEPHRECTOMY Left 7/25/2014    Performed by Marina Hansen MD at [facility] MAIN OR   • SKIN SURGERY Right 8-11-14    Excision for BCC, nodular to right upper back, KS   • SKIN SURGERY Left 8/21/14    MMS for BCC-nod to the L Nasal Supratip   • SKIN SURGERY  9/16/ lisinopril 20 MG Oral Tab Take 1 tablet (20 mg total) by mouth 2 (two) times daily.  Disp: 180 tablet Rfl: 1   Fluocinonide 0.05 % External Cream Apply to affected area twice daily Disp: 30 g Rfl: 0   furosemide 40 MG Oral Tab Take 1 tablet (40 mg total)

## 2018-10-24 ENCOUNTER — HOSPITAL ENCOUNTER (OUTPATIENT)
Dept: CT IMAGING | Facility: HOSPITAL | Age: 78
Discharge: HOME | End: 2018-10-24
Attending: INTERNAL MEDICINE
Payer: MEDICARE

## 2018-10-24 ENCOUNTER — LAB ENCOUNTER (OUTPATIENT)
Dept: LAB | Facility: HOSPITAL | Age: 78
End: 2018-10-24
Attending: INTERNAL MEDICINE
Payer: MEDICARE

## 2018-10-24 VITALS
HEART RATE: 68 BPM | RESPIRATION RATE: 16 BRPM | TEMPERATURE: 97 F | OXYGEN SATURATION: 98 % | BODY MASS INDEX: 27.85 KG/M2 | HEIGHT: 74 IN | WEIGHT: 217 LBS | DIASTOLIC BLOOD PRESSURE: 67 MMHG | SYSTOLIC BLOOD PRESSURE: 147 MMHG

## 2018-10-24 DIAGNOSIS — R80.9 PROTEINURIA, UNSPECIFIED TYPE: Primary | ICD-10-CM

## 2018-10-24 DIAGNOSIS — R80.9 PROTEINURIA, UNSPECIFIED TYPE: ICD-10-CM

## 2018-10-24 PROCEDURE — 99153 MOD SED SAME PHYS/QHP EA: CPT | Performed by: INTERNAL MEDICINE

## 2018-10-24 PROCEDURE — 36415 COLL VENOUS BLD VENIPUNCTURE: CPT

## 2018-10-24 PROCEDURE — 85610 PROTHROMBIN TIME: CPT

## 2018-10-24 PROCEDURE — 88305 TISSUE EXAM BY PATHOLOGIST: CPT | Performed by: INTERNAL MEDICINE

## 2018-10-24 PROCEDURE — 88313 SPECIAL STAINS GROUP 2: CPT | Performed by: INTERNAL MEDICINE

## 2018-10-24 PROCEDURE — 99152 MOD SED SAME PHYS/QHP 5/>YRS: CPT | Performed by: INTERNAL MEDICINE

## 2018-10-24 PROCEDURE — 50200 RENAL BIOPSY PERQ: CPT | Performed by: INTERNAL MEDICINE

## 2018-10-24 PROCEDURE — 77012 CT SCAN FOR NEEDLE BIOPSY: CPT | Performed by: INTERNAL MEDICINE

## 2018-10-24 PROCEDURE — 88346 IMFLUOR 1ST 1ANTB STAIN PX: CPT | Performed by: INTERNAL MEDICINE

## 2018-10-24 PROCEDURE — 88348 ELECTRON MICROSCOPY DX: CPT | Performed by: INTERNAL MEDICINE

## 2018-10-24 PROCEDURE — 88350 IMFLUOR EA ADDL 1ANTB STN PX: CPT | Performed by: INTERNAL MEDICINE

## 2018-10-24 RX ORDER — SODIUM CHLORIDE 9 MG/ML
INJECTION, SOLUTION INTRAVENOUS CONTINUOUS
Status: DISCONTINUED | OUTPATIENT
Start: 2018-10-24 | End: 2018-10-26

## 2018-10-24 RX ORDER — NALOXONE HYDROCHLORIDE 0.4 MG/ML
80 INJECTION, SOLUTION INTRAMUSCULAR; INTRAVENOUS; SUBCUTANEOUS AS NEEDED
Status: DISCONTINUED | OUTPATIENT
Start: 2018-10-24 | End: 2018-10-26

## 2018-10-24 RX ORDER — MIDAZOLAM HYDROCHLORIDE 1 MG/ML
INJECTION INTRAMUSCULAR; INTRAVENOUS
Status: COMPLETED
Start: 2018-10-24 | End: 2018-10-24

## 2018-10-24 RX ORDER — MIDAZOLAM HYDROCHLORIDE 1 MG/ML
1 INJECTION INTRAMUSCULAR; INTRAVENOUS EVERY 5 MIN PRN
Status: ACTIVE | OUTPATIENT
Start: 2018-10-24 | End: 2018-10-24

## 2018-10-24 RX ORDER — FLUMAZENIL 0.1 MG/ML
0.2 INJECTION, SOLUTION INTRAVENOUS AS NEEDED
Status: DISCONTINUED | OUTPATIENT
Start: 2018-10-24 | End: 2018-10-26

## 2018-10-24 RX ADMIN — MIDAZOLAM HYDROCHLORIDE 1 MG: 1 INJECTION INTRAMUSCULAR; INTRAVENOUS at 11:35:00

## 2018-10-24 RX ADMIN — MIDAZOLAM HYDROCHLORIDE 1 MG: 1 INJECTION INTRAMUSCULAR; INTRAVENOUS at 11:15:00

## 2018-10-24 RX ADMIN — SODIUM CHLORIDE 250 ML: 9 INJECTION, SOLUTION INTRAVENOUS at 10:45:00

## 2018-10-24 NOTE — OR NURSING
Notified Dr. Geri Puente of patient status update including vitals, incision site and dressings. Ok to start all home medications post-op day 1 including blood thinners. Patient instructions provided, verbalizes understanding.

## 2018-10-24 NOTE — IMAGING NOTE
Patient presents to CT with wife for CT BX of kidney due to protein in urine. Patient affect is good and conversational. 22g IV started in right hand. Discussed procedure with patient and answered all questions.   Dr. Earnestine Jasso described procedure to patient

## 2018-10-24 NOTE — PROCEDURES
[facility]  Procedure Note    Kar Moore Patient Status:  Outpatient    1940 MRN [id number]   [facility] CT Attending Mirna Boone MD   Hosp Day # 0 PCP Frances Granda MD     Procedure: CT guided renal biopsy    Pre-Proc

## 2018-10-29 DIAGNOSIS — R80.9 PROTEINURIA, UNSPECIFIED TYPE: Primary | ICD-10-CM

## 2018-10-30 ENCOUNTER — APPOINTMENT (OUTPATIENT)
Dept: LAB | Age: 78
End: 2018-10-30
Attending: INTERNAL MEDICINE
Payer: MEDICARE

## 2018-10-30 DIAGNOSIS — R80.9 PROTEINURIA, UNSPECIFIED TYPE: ICD-10-CM

## 2018-10-30 PROCEDURE — 80048 BASIC METABOLIC PNL TOTAL CA: CPT

## 2018-10-30 PROCEDURE — 83735 ASSAY OF MAGNESIUM: CPT

## 2018-10-30 PROCEDURE — 84156 ASSAY OF PROTEIN URINE: CPT

## 2018-10-30 PROCEDURE — 82570 ASSAY OF URINE CREATININE: CPT

## 2018-10-30 PROCEDURE — 36415 COLL VENOUS BLD VENIPUNCTURE: CPT

## 2018-11-05 ENCOUNTER — OFFICE VISIT (OUTPATIENT)
Dept: INTERNAL MEDICINE CLINIC | Facility: CLINIC | Age: 78
End: 2018-11-05
Payer: MEDICARE

## 2018-11-05 VITALS
WEIGHT: 215 LBS | HEIGHT: 74 IN | RESPIRATION RATE: 16 BRPM | HEART RATE: 87 BPM | BODY MASS INDEX: 27.59 KG/M2 | TEMPERATURE: 98 F | SYSTOLIC BLOOD PRESSURE: 130 MMHG | DIASTOLIC BLOOD PRESSURE: 70 MMHG

## 2018-11-05 DIAGNOSIS — I48.92 ATRIAL FLUTTER, PAROXYSMAL (HCC): ICD-10-CM

## 2018-11-05 DIAGNOSIS — I71.4 ABDOMINAL AORTIC ANEURYSM (AAA) WITHOUT RUPTURE (HCC): ICD-10-CM

## 2018-11-05 DIAGNOSIS — Z85.528 HISTORY OF RENAL CELL CARCINOMA: ICD-10-CM

## 2018-11-05 DIAGNOSIS — I10 ESSENTIAL HYPERTENSION: ICD-10-CM

## 2018-11-05 DIAGNOSIS — E78.2 MIXED HYPERLIPIDEMIA: ICD-10-CM

## 2018-11-05 DIAGNOSIS — R91.8 LUNG NODULES: ICD-10-CM

## 2018-11-05 DIAGNOSIS — Z79.01 LONG TERM (CURRENT) USE OF ANTICOAGULANTS: ICD-10-CM

## 2018-11-05 DIAGNOSIS — Z00.00 ENCOUNTER FOR ANNUAL HEALTH EXAMINATION: ICD-10-CM

## 2018-11-05 DIAGNOSIS — N40.1 BPH WITH OBSTRUCTION/LOWER URINARY TRACT SYMPTOMS: ICD-10-CM

## 2018-11-05 DIAGNOSIS — I35.8 AORTIC VALVE SCLEROSIS: ICD-10-CM

## 2018-11-05 DIAGNOSIS — Z00.00 ANNUAL PHYSICAL EXAM: Primary | ICD-10-CM

## 2018-11-05 DIAGNOSIS — I25.10 CORONARY ARTERY DISEASE INVOLVING NATIVE CORONARY ARTERY OF NATIVE HEART WITHOUT ANGINA PECTORIS: ICD-10-CM

## 2018-11-05 DIAGNOSIS — N13.8 BPH WITH OBSTRUCTION/LOWER URINARY TRACT SYMPTOMS: ICD-10-CM

## 2018-11-05 PROCEDURE — G0439 PPPS, SUBSEQ VISIT: HCPCS | Performed by: INTERNAL MEDICINE

## 2018-11-05 NOTE — PATIENT INSTRUCTIONS
Low-Salt Choices  Eating salt (sodium) can make your body retain too much water. Excess water makes your heart work harder. Canned, packaged, and frozen foods are easy to prepare. But they are often high in sodium.  Here are some ideas for low-salt foods Tests on this list are recommended by your physician but may not be covered, or covered at this frequency, by your insurer. Please check with your insurance carrier before scheduling to verify coverage.     PREVENTATIVE SERVICES  INDICATIONS AND SCHEDULE In • Men who are 73-68 years old and have smoked more than 100 cigarettes in their lifetime   • Anyone with a family history    Colorectal Cancer Screening Covered up to Age 76     Colonoscopy Screen   Covered every 10 years- more often if abnormal There are • PNEUMOCOCCAL VACC, 13 KAITLYNN IM    Please get once after your 65th birthday    Pneumococcal 23 (Pneumovax)  Covered Once after 65 No orders found for this or any previous visit.  Please get once after your 65th birthday    Hepatitis B for Moderate/High Risk

## 2018-11-05 NOTE — PROGRESS NOTES
HPI:   Chaz Mendoza is a [de-identified]year old male who presents for a Medicare Subsequent Annual Wellness visit (Pt already had Initial Annual Wellness).     HPI:  Here for AWV  Reports normal state of health  Has done well since starting meds  Denies edema, Murphy Man already takes aspirin and has it on his medication list.   CAGE Alcohol screening   [address] was screened for Alcohol abuse and had a score of 0 so is at low risk.      Patient Care Team: Patient Care Team:  Betty Peace MD as PCP - Gregory Noel tablet (40 mg total) by mouth daily.    METOPROLOL SUCCINATE ER 50 MG Oral Tablet 24 Hr TAKE 0.5 TABLET BY MOUTH TWICE DAILY   ATORVASTATIN 10 MG Oral Tab TAKE 1 TABLET BY MOUTH DAILY   XARELTO 20 MG Oral Tab TAKE 1 TABLET BY MOUTH DAILY WITH FOOD   Choleca exertion  CARDIOVASCULAR: denies chest pain on exertion  GI: denies abdominal pain, denies heartburn  : 3 per night nocturia, no complaint of urinary incontinence  MUSCULOSKELETAL: denies back pain  NEURO: denies headaches  PSYCHE: denies depression or a Extremities normal, atraumatic, no cyanosis or edema   Pulses: 2+ and symmetric   Skin: Skin color, texture, turgor normal, no rashes or lesions   Lymph nodes: Cervical, supraclavicular nodes normal   Neurologic: Non focal            Vaccination History stable. Cont finasteride     Lung nodules- stable. monitor     Abdominal aortic aneurysm (AAA) without rupture ([address]- stable. Cont HTN control.  Serial monitoring     Up to date with vaccines    The patient indicates understanding of these issues and agrees flowsheet data found. Glaucoma Screening      Ophthalmology Visit Annually: Diabetics, FHx Glaucoma, AA>50, > 65 No flowsheet data found.     Prostate Cancer Screening      PSA  Annually There are no preventive care reminders to display for this ID: Scarlet John is a [de-identified]year old male.     HPI    Review of Systems         Current Outpatient Medications:  hydrALAzine HCl 50 MG Oral Tab Take one and one-half tablets by mouth three times a day Disp: 270 tablet Rfl: 1   lisinopril 20 MG Oral Tab

## 2018-11-12 ENCOUNTER — OFFICE VISIT (OUTPATIENT)
Dept: NEPHROLOGY | Facility: CLINIC | Age: 78
End: 2018-11-12
Payer: MEDICARE

## 2018-11-12 VITALS — BODY MASS INDEX: 28 KG/M2 | SYSTOLIC BLOOD PRESSURE: 134 MMHG | DIASTOLIC BLOOD PRESSURE: 70 MMHG | WEIGHT: 216 LBS

## 2018-11-12 DIAGNOSIS — N04.9 NEPHROTIC SYNDROME: Primary | ICD-10-CM

## 2018-11-12 PROCEDURE — 99215 OFFICE O/P EST HI 40 MIN: CPT | Performed by: INTERNAL MEDICINE

## 2018-11-12 RX ORDER — FUROSEMIDE 40 MG/1
20 TABLET ORAL DAILY PRN
Qty: 30 TABLET | Refills: 3 | Status: SHIPPED | OUTPATIENT
Start: 2018-11-12 | End: 2019-01-15

## 2018-11-12 RX ORDER — TAMSULOSIN HYDROCHLORIDE 0.4 MG/1
0.4 CAPSULE ORAL DAILY
Qty: 30 CAPSULE | Refills: 3 | Status: SHIPPED | OUTPATIENT
Start: 2018-11-12 | End: 2018-12-12

## 2018-11-12 NOTE — PROGRESS NOTES
Nephrology Progress Note      Ed Sonal is a [de-identified]year old male.     HPI:   Patient presents with:  Proteinuria  Hypertension      [de-identified] y/o male with hx of CAD, GERD, DVT, HTN, hx of Renal cell cancer s/p partial L nephrectomy who seen in hospital las • Visual impairment     glasses      Past Surgical History:   Procedure Laterality Date   • CABG  1993    triple bypass   • CATH PERCUTANEOUS  TRANSLUMINAL CORONARY ANGIOPLASTY      early 1990's    • CHOLECYSTECTOMY     • COLONOSCOPY  9/8/2004, 2011    x 81 MG Oral Tab Take 81 mg by mouth daily.  Disp:  Rfl:        Allergies:  No Known Allergies      ROS:   See above; 12 systems reviewed and otherwise unremarkable        PHYSICAL EXAM:   /70   Wt 216 lb   BMI 27.73 kg/m²   Wt Readings from Last 6 [place]

## 2018-11-16 PROCEDURE — 81001 URINALYSIS AUTO W/SCOPE: CPT | Performed by: INTERNAL MEDICINE

## 2018-11-16 PROCEDURE — 80048 BASIC METABOLIC PNL TOTAL CA: CPT | Performed by: INTERNAL MEDICINE

## 2018-11-16 PROCEDURE — 83735 ASSAY OF MAGNESIUM: CPT | Performed by: INTERNAL MEDICINE

## 2018-11-16 PROCEDURE — 84100 ASSAY OF PHOSPHORUS: CPT | Performed by: INTERNAL MEDICINE

## 2018-11-16 PROCEDURE — 84156 ASSAY OF PROTEIN URINE: CPT | Performed by: INTERNAL MEDICINE

## 2018-11-16 PROCEDURE — 82570 ASSAY OF URINE CREATININE: CPT | Performed by: INTERNAL MEDICINE

## 2018-12-20 DIAGNOSIS — I48.92 ATRIAL FLUTTER, PAROXYSMAL (HCC): ICD-10-CM

## 2018-12-20 DIAGNOSIS — Z85.828 PERSONAL HISTORY OF OTHER MALIGNANT NEOPLASM OF SKIN: ICD-10-CM

## 2018-12-20 DIAGNOSIS — E78.2 MIXED HYPERLIPIDEMIA: ICD-10-CM

## 2018-12-20 DIAGNOSIS — I10 ESSENTIAL HYPERTENSION, BENIGN: ICD-10-CM

## 2018-12-20 RX ORDER — METOPROLOL SUCCINATE 50 MG/1
TABLET, EXTENDED RELEASE ORAL
Qty: 90 TABLET | Refills: 1 | Status: SHIPPED | OUTPATIENT
Start: 2018-12-20 | End: 2019-01-08

## 2018-12-20 RX ORDER — RAMIPRIL 10 MG/1
CAPSULE ORAL
Qty: 90 CAPSULE | Refills: 1 | Status: SHIPPED | OUTPATIENT
Start: 2018-12-20 | End: 2019-02-27 | Stop reason: ALTCHOICE

## 2018-12-20 RX ORDER — ATORVASTATIN CALCIUM 10 MG/1
TABLET, FILM COATED ORAL
Qty: 90 TABLET | Refills: 1 | Status: SHIPPED | OUTPATIENT
Start: 2018-12-20 | End: 2019-01-08

## 2019-01-08 DIAGNOSIS — I10 ESSENTIAL HYPERTENSION, BENIGN: ICD-10-CM

## 2019-01-08 DIAGNOSIS — E78.2 MIXED HYPERLIPIDEMIA: ICD-10-CM

## 2019-01-08 DIAGNOSIS — Z85.828 PERSONAL HISTORY OF OTHER MALIGNANT NEOPLASM OF SKIN: ICD-10-CM

## 2019-01-08 DIAGNOSIS — I48.92 ATRIAL FLUTTER, PAROXYSMAL (HCC): ICD-10-CM

## 2019-01-08 RX ORDER — METOPROLOL SUCCINATE 50 MG/1
TABLET, EXTENDED RELEASE ORAL
Qty: 90 TABLET | Refills: 1 | Status: SHIPPED | OUTPATIENT
Start: 2019-01-08 | End: 2019-11-21

## 2019-01-08 RX ORDER — ATORVASTATIN CALCIUM 10 MG/1
10 TABLET, FILM COATED ORAL
Qty: 90 TABLET | Refills: 1 | Status: SHIPPED | OUTPATIENT
Start: 2019-01-08 | End: 2019-10-26

## 2019-01-08 NOTE — TELEPHONE ENCOUNTER
Patient called and requested a refill on   METOPROLOL SUCCINATE ER 50 MG Oral Tablet 24 Hr 90 tablet 1     And Atorvastatin 10 mg for 90 days to be sent to [facility] on file.

## 2019-01-15 DIAGNOSIS — I10 ESSENTIAL HYPERTENSION: Primary | ICD-10-CM

## 2019-01-15 DIAGNOSIS — I25.10 CORONARY ARTERY DISEASE INVOLVING NATIVE CORONARY ARTERY OF NATIVE HEART WITHOUT ANGINA PECTORIS: ICD-10-CM

## 2019-01-15 RX ORDER — FUROSEMIDE 40 MG/1
20 TABLET ORAL DAILY PRN
Qty: 90 TABLET | Refills: 0 | Status: ON HOLD | OUTPATIENT
Start: 2019-01-15 | End: 2020-01-18

## 2019-01-15 NOTE — TELEPHONE ENCOUNTER
Patient called and requested a refill on furosemide 40 MG Oral Tab requesting a 3 month supply. Please send to [facility] on file.

## 2019-02-20 DIAGNOSIS — N18.30 CHRONIC KIDNEY DISEASE, STAGE III (MODERATE) (HCC): Primary | ICD-10-CM

## 2019-02-20 DIAGNOSIS — R80.9 PROTEINURIA, UNSPECIFIED TYPE: ICD-10-CM

## 2019-02-21 PROCEDURE — 84100 ASSAY OF PHOSPHORUS: CPT | Performed by: INTERNAL MEDICINE

## 2019-02-21 PROCEDURE — 83735 ASSAY OF MAGNESIUM: CPT | Performed by: INTERNAL MEDICINE

## 2019-02-21 PROCEDURE — 81001 URINALYSIS AUTO W/SCOPE: CPT | Performed by: INTERNAL MEDICINE

## 2019-02-21 PROCEDURE — 82570 ASSAY OF URINE CREATININE: CPT | Performed by: INTERNAL MEDICINE

## 2019-02-21 PROCEDURE — 80048 BASIC METABOLIC PNL TOTAL CA: CPT | Performed by: INTERNAL MEDICINE

## 2019-02-21 PROCEDURE — 84156 ASSAY OF PROTEIN URINE: CPT | Performed by: INTERNAL MEDICINE

## 2019-02-27 ENCOUNTER — OFFICE VISIT (OUTPATIENT)
Dept: NEPHROLOGY | Facility: CLINIC | Age: 79
End: 2019-02-27
Payer: MEDICARE

## 2019-02-27 VITALS — WEIGHT: 215 LBS | DIASTOLIC BLOOD PRESSURE: 66 MMHG | BODY MASS INDEX: 28 KG/M2 | SYSTOLIC BLOOD PRESSURE: 138 MMHG

## 2019-02-27 DIAGNOSIS — Z85.528 HISTORY OF RENAL CELL CARCINOMA: ICD-10-CM

## 2019-02-27 DIAGNOSIS — N40.1 BPH WITH OBSTRUCTION/LOWER URINARY TRACT SYMPTOMS: ICD-10-CM

## 2019-02-27 DIAGNOSIS — N13.8 BPH WITH OBSTRUCTION/LOWER URINARY TRACT SYMPTOMS: ICD-10-CM

## 2019-02-27 DIAGNOSIS — N04.9 NEPHROTIC SYNDROME: Primary | ICD-10-CM

## 2019-02-27 PROCEDURE — 99213 OFFICE O/P EST LOW 20 MIN: CPT | Performed by: INTERNAL MEDICINE

## 2019-02-27 RX ORDER — FINASTERIDE 5 MG/1
5 TABLET, FILM COATED ORAL DAILY
Qty: 90 TABLET | Refills: 3 | Status: SHIPPED | OUTPATIENT
Start: 2019-02-27 | End: 2020-01-20

## 2019-02-27 RX ORDER — TAMSULOSIN HYDROCHLORIDE 0.4 MG/1
0.4 CAPSULE ORAL DAILY
Qty: 90 CAPSULE | Refills: 0 | Status: SHIPPED | OUTPATIENT
Start: 2019-02-27 | End: 2019-03-19

## 2019-02-27 RX ORDER — LISINOPRIL 20 MG/1
20 TABLET ORAL 2 TIMES DAILY
Refills: 1 | COMMUNITY
Start: 2019-01-12 | End: 2019-04-08

## 2019-02-27 NOTE — PROGRESS NOTES
Nephrology Progress Note      Camilla Gutierrez is a [de-identified]year old male.     HPI:   Patient presents with:  Chronic Kidney Disease  Hypertension  Proteinuria      [de-identified] y/o male with hx of CAD, GERD, DVT, HTN, hx of Renal cell cancer s/p partial L nephrectomy, CHOLECYSTECTOMY     • COLONOSCOPY  9/8/2004, 2011    x2   • OPEN HEART SURGERY (PBP)     • ROBOT-ASSISTED LAPAROSCOPIC NEPHRECTOMY Left 7/25/2014    Performed by Boni Smith MD at [address]   • SKIN SURGERY Right 8-11-14    Excision for BCC, nodular t kg/m²   Wt Readings from Last 6 Encounters:  02/27/19 : 215 lb  11/13/18 : 216 lb  11/12/18 : 216 lb  11/05/18 : 215 lb  10/16/18 : 217 lb  10/19/18 : 216 lb      General: Alert and oriented in no apparent distress.   HEENT: No scleral icterus, MMM  Neck: S Ref Range: 4.5 - 8.0  5.0   Protein Urine Latest Ref Range: Negative mg/dl 100 (A)   Urobilinogen Urine Latest Ref Range: 0.2 - 2.0 mg/dL <2.0   Nitrite Urine Latest Ref Range: Negative  Negative   Leukocyte Esterase Urine Latest Ref Range: Negative  Trace

## 2019-03-19 ENCOUNTER — OFFICE VISIT (OUTPATIENT)
Dept: INTERNAL MEDICINE CLINIC | Facility: CLINIC | Age: 79
End: 2019-03-19
Payer: MEDICARE

## 2019-03-19 VITALS
BODY MASS INDEX: 27.72 KG/M2 | HEART RATE: 71 BPM | RESPIRATION RATE: 16 BRPM | DIASTOLIC BLOOD PRESSURE: 86 MMHG | WEIGHT: 216 LBS | SYSTOLIC BLOOD PRESSURE: 136 MMHG | TEMPERATURE: 97 F | HEIGHT: 74 IN

## 2019-03-19 DIAGNOSIS — N40.1 BENIGN PROSTATIC HYPERPLASIA WITH URINARY FREQUENCY: ICD-10-CM

## 2019-03-19 DIAGNOSIS — R35.0 URINARY FREQUENCY: Primary | ICD-10-CM

## 2019-03-19 DIAGNOSIS — R35.0 BENIGN PROSTATIC HYPERPLASIA WITH URINARY FREQUENCY: ICD-10-CM

## 2019-03-19 PROBLEM — N05.0 MINIMAL CHANGE GLOMERULAR DISEASE: Status: ACTIVE | Noted: 2019-03-19

## 2019-03-19 LAB
APPEARANCE: CLEAR
BILIRUBIN: NEGATIVE
GLUCOSE (URINE DIPSTICK): NEGATIVE MG/DL
KETONES (URINE DIPSTICK): NEGATIVE MG/DL
LEUKOCYTES: NEGATIVE
MULTISTIX LOT#: NORMAL NUMERIC
NITRITE, URINE: NEGATIVE
PH, URINE: 5.5 (ref 4.5–8)
PROTEIN (URINE DIPSTICK): 300 MG/DL
SPECIFIC GRAVITY: 1.02 (ref 1–1.03)
URINE-COLOR: YELLOW
UROBILINOGEN,SEMI-QN: 0.2 MG/DL (ref 0–1.9)

## 2019-03-19 PROCEDURE — 99213 OFFICE O/P EST LOW 20 MIN: CPT | Performed by: INTERNAL MEDICINE

## 2019-03-19 PROCEDURE — 81003 URINALYSIS AUTO W/O SCOPE: CPT | Performed by: INTERNAL MEDICINE

## 2019-03-19 RX ORDER — TAMSULOSIN HYDROCHLORIDE 0.4 MG/1
0.8 CAPSULE ORAL DAILY
Qty: 180 CAPSULE | Refills: 0 | COMMUNITY
Start: 2019-03-19 | End: 2019-04-29

## 2019-03-19 NOTE — PROGRESS NOTES
HPI:    Patient ID: Adelina Rebollar is a [de-identified]year old male. Urinary Frequency    This is a new problem. The current episode started 1 to 4 weeks ago. The problem occurs every urination. The problem has been gradually worsening.  The pain is at a sever one and one-half tablets by mouth three times a day (Patient taking differently: Take 50 mg by mouth 3 (three) times daily.  Take one and one-half tablets by mouth three times a day ) Disp: 270 tablet Rfl: 1   Cholecalciferol (VITAMIN D3) 2000 UNITS Oral Ta

## 2019-04-08 DIAGNOSIS — N04.9 NEPHROTIC SYNDROME: ICD-10-CM

## 2019-04-08 DIAGNOSIS — I10 ESSENTIAL HYPERTENSION: Primary | ICD-10-CM

## 2019-04-08 RX ORDER — TAMSULOSIN HYDROCHLORIDE 0.4 MG/1
CAPSULE ORAL
Qty: 90 CAPSULE | Refills: 0 | OUTPATIENT
Start: 2019-04-08

## 2019-04-08 RX ORDER — HYDRALAZINE HYDROCHLORIDE 25 MG/1
50 TABLET, FILM COATED ORAL 3 TIMES DAILY
Qty: 270 TABLET | Refills: 1 | OUTPATIENT
Start: 2019-04-08

## 2019-04-08 RX ORDER — LISINOPRIL 20 MG/1
TABLET ORAL
Qty: 180 TABLET | Refills: 1 | Status: SHIPPED | OUTPATIENT
Start: 2019-04-08 | End: 2019-09-30

## 2019-04-08 RX ORDER — HYDRALAZINE HYDROCHLORIDE 50 MG/1
50 TABLET, FILM COATED ORAL 3 TIMES DAILY
Qty: 270 TABLET | Refills: 1 | Status: SHIPPED | OUTPATIENT
Start: 2019-04-08 | End: 2019-11-15

## 2019-04-23 PROCEDURE — 81015 MICROSCOPIC EXAM OF URINE: CPT | Performed by: UROLOGY

## 2019-04-29 DIAGNOSIS — N40.1 BENIGN PROSTATIC HYPERPLASIA WITH URINARY FREQUENCY: ICD-10-CM

## 2019-04-29 DIAGNOSIS — R35.0 BENIGN PROSTATIC HYPERPLASIA WITH URINARY FREQUENCY: ICD-10-CM

## 2019-04-29 RX ORDER — TAMSULOSIN HYDROCHLORIDE 0.4 MG/1
0.8 CAPSULE ORAL NIGHTLY
Qty: 180 CAPSULE | Refills: 0 | Status: SHIPPED | OUTPATIENT
Start: 2019-04-29 | End: 2019-07-21

## 2019-04-29 NOTE — TELEPHONE ENCOUNTER
The pt's Flomax dose was increased on 3/19/2019:     Benign prostatic hyperplasia with urinary frequency  - increase flomax to 0.8 mg qhs. Cont finasteride. Refer to urology for discussion of other treatment options.  Avoid bladder stimulant (caffeine, suga

## 2019-05-07 ENCOUNTER — OFFICE VISIT (OUTPATIENT)
Dept: INTERNAL MEDICINE CLINIC | Facility: CLINIC | Age: 79
End: 2019-05-07
Payer: MEDICARE

## 2019-05-07 VITALS
RESPIRATION RATE: 16 BRPM | SYSTOLIC BLOOD PRESSURE: 138 MMHG | WEIGHT: 215 LBS | HEIGHT: 74 IN | TEMPERATURE: 97 F | BODY MASS INDEX: 27.59 KG/M2 | HEART RATE: 68 BPM | DIASTOLIC BLOOD PRESSURE: 70 MMHG

## 2019-05-07 DIAGNOSIS — I25.10 CORONARY ARTERY DISEASE INVOLVING NATIVE CORONARY ARTERY OF NATIVE HEART WITHOUT ANGINA PECTORIS: ICD-10-CM

## 2019-05-07 DIAGNOSIS — E78.2 MIXED HYPERLIPIDEMIA: ICD-10-CM

## 2019-05-07 DIAGNOSIS — I10 ESSENTIAL HYPERTENSION: Primary | ICD-10-CM

## 2019-05-07 DIAGNOSIS — N04.9 NEPHROTIC SYNDROME: ICD-10-CM

## 2019-05-07 PROCEDURE — 99214 OFFICE O/P EST MOD 30 MIN: CPT | Performed by: INTERNAL MEDICINE

## 2019-05-07 NOTE — PROGRESS NOTES
HPI:    Patient ID: Nichol Ko is a [de-identified]year old male. HPI  Nichol Ko is a [de-identified]year old male. HPI:   Patient presents for recheck of his hypertension, hyperlipidemia, cad and nephrotic syndrome . Reports normal state of health.  Sees Take 0.5 tablets (20 mg total) by mouth daily as needed (edema).  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 Disp: 270 tablet Rfl: 1   finasteride 5 MG Oral Tab Take 1 tablet (5 mg total) by mouth daily. Disp: 90 tablet Rfl: 3   furosemide 40 MG Oral Tab Take 0.5 tablets (20 mg total) by mouth daily as needed (edema).  Disp: 90 tablet Rfl: 0   Metoprolol [place]

## 2019-06-12 ENCOUNTER — LAB ENCOUNTER (OUTPATIENT)
Dept: LAB | Age: 79
End: 2019-06-12
Attending: INTERNAL MEDICINE
Payer: MEDICARE

## 2019-06-12 DIAGNOSIS — N04.9 NEPHROTIC SYNDROME: ICD-10-CM

## 2019-06-12 PROCEDURE — 85025 COMPLETE CBC W/AUTO DIFF WBC: CPT

## 2019-06-12 PROCEDURE — 82306 VITAMIN D 25 HYDROXY: CPT

## 2019-06-12 PROCEDURE — 81001 URINALYSIS AUTO W/SCOPE: CPT | Performed by: INTERNAL MEDICINE

## 2019-06-12 PROCEDURE — 83735 ASSAY OF MAGNESIUM: CPT

## 2019-06-12 PROCEDURE — 80048 BASIC METABOLIC PNL TOTAL CA: CPT

## 2019-06-12 PROCEDURE — 82570 ASSAY OF URINE CREATININE: CPT | Performed by: INTERNAL MEDICINE

## 2019-06-12 PROCEDURE — 83970 ASSAY OF PARATHORMONE: CPT

## 2019-06-12 PROCEDURE — 84156 ASSAY OF PROTEIN URINE: CPT | Performed by: INTERNAL MEDICINE

## 2019-06-19 ENCOUNTER — OFFICE VISIT (OUTPATIENT)
Dept: NEPHROLOGY | Facility: CLINIC | Age: 79
End: 2019-06-19
Payer: MEDICARE

## 2019-06-19 VITALS — SYSTOLIC BLOOD PRESSURE: 134 MMHG | DIASTOLIC BLOOD PRESSURE: 62 MMHG | BODY MASS INDEX: 27 KG/M2 | WEIGHT: 211 LBS

## 2019-06-19 DIAGNOSIS — N04.9 NEPHROTIC SYNDROME: Primary | ICD-10-CM

## 2019-06-19 PROCEDURE — 99213 OFFICE O/P EST LOW 20 MIN: CPT | Performed by: INTERNAL MEDICINE

## 2019-06-19 NOTE — PROGRESS NOTES
Nephrology Progress Note      Jennyfer Junior is a [de-identified]year old male.     HPI:   Patient presents with:  Chronic Kidney Disease  Hypertension  Proteinuria      [de-identified] y/o male with hx of CAD, GERD, DVT, HTN, hx of Renal cell cancer s/p partial L nephrectomy, COLONOSCOPY  9/8/2004, 2011    x2   • OPEN HEART SURGERY (PBP)     • ROBOT-ASSISTED LAPAROSCOPIC NEPHRECTOMY Left 7/25/2014    Performed by Albert Casiano MD at [address] [facility]   • SKIN SURGERY Right 8-11-14    Excision for BCC, nodular to right upper back, KS MOUTH DAILY WITH FOOD Disp: 90 tablet Rfl: 2   Cholecalciferol (VITAMIN D3) 2000 UNITS Oral Tab Take  by mouth daily. Disp:  Rfl:    aspirin 81 MG Oral Tab Take 81 mg by mouth daily.  Disp:  Rfl:        Allergies:  No Known Allergies      ROS:   See above; 39.0 - 53.0 % 36.2 (L)     Platelet Count      608.4 - 450.0 10(3)uL 208.0     MCV      80.0 - 100.0 fL 92.8     MCH      26.0 - 34.0 pg 29.2     MCHC      31.0 - 37.0 g/dL 31.5     RDW      11.0 - 15.0 % 13.1     RDW-SD      35.1 - 46.3 fL 44.2     Prelim 1.6 - 2.6 mg/dL 2.0     PTH INTACT      18.5 - 88.0 pg/mL 53.8     VITAMIN D, 25-HYDROXY      30.0 - 100.0 ng/mL 30.2       Component      Latest Ref Rng & Units 2/21/2019   Glucose Urine      Negative mg/dl Negative   Bilirubin      Negative Negative   Ke mg/dL    Sodium      136 - 145 mmol/L    Potassium      3.5 - 5.1 mmol/L    Chloride      98 - 112 mmol/L    Carbon Dioxide, Total      21.0 - 32.0 mmol/L    ANION GAP      0 - 18 mmol/L    BUN      7 - 18 mg/dL    CREATININE      0.70 - 1.30 mg/dL    BUN/

## 2019-07-21 ENCOUNTER — TELEPHONE (OUTPATIENT)
Dept: INTERNAL MEDICINE CLINIC | Facility: CLINIC | Age: 79
End: 2019-07-21

## 2019-07-21 DIAGNOSIS — N40.1 BENIGN PROSTATIC HYPERPLASIA WITH URINARY FREQUENCY: ICD-10-CM

## 2019-07-21 DIAGNOSIS — R35.0 BENIGN PROSTATIC HYPERPLASIA WITH URINARY FREQUENCY: ICD-10-CM

## 2019-07-22 RX ORDER — TAMSULOSIN HYDROCHLORIDE 0.4 MG/1
0.8 CAPSULE ORAL NIGHTLY
Qty: 180 CAPSULE | Refills: 1 | Status: SHIPPED | OUTPATIENT
Start: 2019-07-22 | End: 2020-01-19

## 2019-09-16 DIAGNOSIS — L30.9 DERMATITIS: Primary | ICD-10-CM

## 2019-09-30 DIAGNOSIS — I10 ESSENTIAL HYPERTENSION: ICD-10-CM

## 2019-09-30 RX ORDER — LISINOPRIL 20 MG/1
TABLET ORAL
Qty: 180 TABLET | Refills: 1 | Status: SHIPPED | OUTPATIENT
Start: 2019-09-30 | End: 2019-12-13

## 2019-09-30 NOTE — TELEPHONE ENCOUNTER
Last time medication was refilled 04/2019   Quantity  and number of refills 180 with 1 refill    Last office visit 06/2019   Next office visit 11/2019 scheduled.

## 2019-10-17 PROBLEM — R35.0 URINARY FREQUENCY: Status: ACTIVE | Noted: 2019-10-17

## 2019-10-26 DIAGNOSIS — Z85.828 PERSONAL HISTORY OF OTHER MALIGNANT NEOPLASM OF SKIN: ICD-10-CM

## 2019-10-26 DIAGNOSIS — I48.92 ATRIAL FLUTTER, PAROXYSMAL (HCC): ICD-10-CM

## 2019-10-26 DIAGNOSIS — I10 ESSENTIAL HYPERTENSION, BENIGN: ICD-10-CM

## 2019-10-26 DIAGNOSIS — E78.2 MIXED HYPERLIPIDEMIA: ICD-10-CM

## 2019-10-28 RX ORDER — ATORVASTATIN CALCIUM 10 MG/1
TABLET, FILM COATED ORAL
Qty: 90 TABLET | Refills: 1 | Status: SHIPPED | OUTPATIENT
Start: 2019-10-28 | End: 2020-06-29

## 2019-11-05 ENCOUNTER — OFFICE VISIT (OUTPATIENT)
Dept: INTERNAL MEDICINE CLINIC | Facility: CLINIC | Age: 79
End: 2019-11-05
Payer: MEDICARE

## 2019-11-05 VITALS
HEART RATE: 80 BPM | HEIGHT: 74 IN | WEIGHT: 211 LBS | RESPIRATION RATE: 16 BRPM | DIASTOLIC BLOOD PRESSURE: 80 MMHG | SYSTOLIC BLOOD PRESSURE: 130 MMHG | TEMPERATURE: 98 F | BODY MASS INDEX: 27.08 KG/M2 | OXYGEN SATURATION: 98 %

## 2019-11-05 DIAGNOSIS — Z00.00 ANNUAL PHYSICAL EXAM: Primary | ICD-10-CM

## 2019-11-05 DIAGNOSIS — N04.9 NEPHROTIC SYNDROME: ICD-10-CM

## 2019-11-05 DIAGNOSIS — I35.8 AORTIC VALVE SCLEROSIS: ICD-10-CM

## 2019-11-05 DIAGNOSIS — I71.4 ABDOMINAL AORTIC ANEURYSM (AAA) WITHOUT RUPTURE (HCC): ICD-10-CM

## 2019-11-05 DIAGNOSIS — I10 ESSENTIAL HYPERTENSION: ICD-10-CM

## 2019-11-05 DIAGNOSIS — E78.2 MIXED HYPERLIPIDEMIA: ICD-10-CM

## 2019-11-05 DIAGNOSIS — N40.1 BPH WITH OBSTRUCTION/LOWER URINARY TRACT SYMPTOMS: ICD-10-CM

## 2019-11-05 DIAGNOSIS — Z79.01 LONG TERM (CURRENT) USE OF ANTICOAGULANTS: ICD-10-CM

## 2019-11-05 DIAGNOSIS — Z23 NEED FOR INFLUENZA VACCINATION: ICD-10-CM

## 2019-11-05 DIAGNOSIS — I25.10 CORONARY ARTERY DISEASE INVOLVING NATIVE CORONARY ARTERY OF NATIVE HEART WITHOUT ANGINA PECTORIS: ICD-10-CM

## 2019-11-05 DIAGNOSIS — I48.92 ATRIAL FLUTTER, PAROXYSMAL (HCC): ICD-10-CM

## 2019-11-05 DIAGNOSIS — N05.0 MINIMAL CHANGE GLOMERULAR DISEASE: ICD-10-CM

## 2019-11-05 DIAGNOSIS — Z00.00 ENCOUNTER FOR ANNUAL HEALTH EXAMINATION: ICD-10-CM

## 2019-11-05 DIAGNOSIS — Z85.528 HISTORY OF RENAL CELL CARCINOMA: ICD-10-CM

## 2019-11-05 DIAGNOSIS — R91.8 LUNG NODULES: ICD-10-CM

## 2019-11-05 DIAGNOSIS — N13.8 BPH WITH OBSTRUCTION/LOWER URINARY TRACT SYMPTOMS: ICD-10-CM

## 2019-11-05 PROBLEM — R35.1 NOCTURIA: Status: RESOLVED | Noted: 2017-10-19 | Resolved: 2019-11-05

## 2019-11-05 PROBLEM — R35.0 URINARY FREQUENCY: Status: RESOLVED | Noted: 2019-10-17 | Resolved: 2019-11-05

## 2019-11-05 PROCEDURE — G0008 ADMIN INFLUENZA VIRUS VAC: HCPCS | Performed by: INTERNAL MEDICINE

## 2019-11-05 PROCEDURE — G0439 PPPS, SUBSEQ VISIT: HCPCS | Performed by: INTERNAL MEDICINE

## 2019-11-05 PROCEDURE — 90662 IIV NO PRSV INCREASED AG IM: CPT | Performed by: INTERNAL MEDICINE

## 2019-11-05 NOTE — PROGRESS NOTES
HPI:   Enmanuel Jasmine is a 78year old male who presents for a Medicare Subsequent Annual Wellness visit (Pt already had Initial Annual Wellness). HPI:  Here for AWV  Normal state of health  Seen urology recently.  5 year survivor of renal cell carc (UROLOGY)  David Rosen MD (Cardiovascular Diseases)  Karime Vergara MD (DERMATOLOGY)  Guzman Sarmiento MD (NEPHROLOGY)    Patient Active Problem List:     Essential hypertension     Atrial flutter, paroxysmal ([facility]     Long term (current) use of anticoag daily. )  finasteride 5 MG Oral Tab, Take 1 tablet (5 mg total) by mouth daily. furosemide 40 MG Oral Tab, Take 0.5 tablets (20 mg total) by mouth daily as needed (edema).   Metoprolol Succinate ER 50 MG Oral Tablet 24 Hr, TAKE 0.5 TABLET BY MOUTH TWICE DA chest pain on exertion  GI: denies abdominal pain, denies heartburn  : 1 per night nocturia, no complaint of urinary incontinence  MUSCULOSKELETAL: denies back pain  NEURO: denies headaches  PSYCHE: denies depression or anxiety  HEMATOLOGIC: denies hx of Extremities: Extremities normal, atraumatic, no cyanosis or edema   Pulses: 2+ and symmetric   Skin: Skin color, texture, turgor normal, no rashes or lesions   Lymph nodes: Cervical, supraclavicular nodes normal   Neurologic: Nonfocal            Vaccinat obstruction/lower urinary tract symptoms- stable.  Per renal service     Lung nodules- resolved on recent CT     Nephrotic syndrome- per renal service     Abdominal aortic aneurysm (AAA) without rupture ([address]- stable cont HT control     Minimal change glome Immunizations (Update Immunization Activity if applicable)     Influenza  Covered Annually 10/3/2018   Please get every year    Pneumococcal 13 (Prevnar)  Covered Once after 65 10/06/2015 Please get once after your 65th birthday    Pneumococcal 23 (Pne MG Oral Tab TAKE 1 TABLET BY MOUTH TWICE A  tablet 1   • TAMSULOSIN HCL 0.4 MG Oral Cap TAKE 2 CAPSULES (0.8 MG TOTAL) BY MOUTH NIGHTLY.  180 capsule 1   • nitroGLYCERIN 0.4 MG Sublingual SL Tab Place 1 tablet (0.4 mg total) under the tongue every 5

## 2019-11-15 DIAGNOSIS — I10 ESSENTIAL HYPERTENSION: ICD-10-CM

## 2019-11-15 RX ORDER — HYDRALAZINE HYDROCHLORIDE 50 MG/1
50 TABLET, FILM COATED ORAL 2 TIMES DAILY
Qty: 180 TABLET | Refills: 1 | OUTPATIENT
Start: 2019-11-15

## 2019-11-15 RX ORDER — HYDRALAZINE HYDROCHLORIDE 50 MG/1
50 TABLET, FILM COATED ORAL 2 TIMES DAILY
Qty: 180 TABLET | Refills: 1 | Status: ON HOLD | COMMUNITY
Start: 2019-11-15 | End: 2020-01-18

## 2020-01-09 ENCOUNTER — APPOINTMENT (OUTPATIENT)
Dept: GENERAL RADIOLOGY | Facility: HOSPITAL | Age: 80
End: 2020-01-09
Attending: NURSE PRACTITIONER
Payer: MEDICARE

## 2020-01-09 ENCOUNTER — APPOINTMENT (OUTPATIENT)
Dept: ULTRASOUND IMAGING | Facility: HOSPITAL | Age: 80
End: 2020-01-09
Attending: NURSE PRACTITIONER
Payer: MEDICARE

## 2020-01-09 ENCOUNTER — TELEPHONE (OUTPATIENT)
Dept: INTERNAL MEDICINE CLINIC | Facility: CLINIC | Age: 80
End: 2020-01-09

## 2020-01-09 ENCOUNTER — HOSPITAL ENCOUNTER (EMERGENCY)
Facility: HOSPITAL | Age: 80
Discharge: HOME | End: 2020-01-09
Attending: EMERGENCY MEDICINE
Payer: MEDICARE

## 2020-01-09 ENCOUNTER — APPOINTMENT (OUTPATIENT)
Dept: CT IMAGING | Facility: HOSPITAL | Age: 80
End: 2020-01-09
Attending: NURSE PRACTITIONER
Payer: MEDICARE

## 2020-01-09 VITALS
TEMPERATURE: 98 F | WEIGHT: 216 LBS | BODY MASS INDEX: 27.72 KG/M2 | HEIGHT: 74 IN | RESPIRATION RATE: 18 BRPM | HEART RATE: 70 BPM | DIASTOLIC BLOOD PRESSURE: 95 MMHG | OXYGEN SATURATION: 99 % | SYSTOLIC BLOOD PRESSURE: 184 MMHG

## 2020-01-09 DIAGNOSIS — R55 SYNCOPE, NEAR: Primary | ICD-10-CM

## 2020-01-09 DIAGNOSIS — R53.1 WEAKNESS: ICD-10-CM

## 2020-01-09 LAB
ALBUMIN SERPL-MCNC: 3 G/DL (ref 3.4–5)
ALBUMIN/GLOB SERPL: 0.8 {RATIO} (ref 1–2)
ALP LIVER SERPL-CCNC: 71 U/L (ref 45–117)
ALT SERPL-CCNC: 19 U/L (ref 16–61)
ANION GAP SERPL CALC-SCNC: 6 MMOL/L (ref 0–18)
AST SERPL-CCNC: 16 U/L (ref 15–37)
BASOPHILS # BLD AUTO: 0.06 X10(3) UL (ref 0–0.2)
BASOPHILS NFR BLD AUTO: 0.5 %
BILIRUB SERPL-MCNC: 0.5 MG/DL (ref 0.1–2)
BILIRUB UR QL STRIP.AUTO: NEGATIVE
BUN BLD-MCNC: 35 MG/DL (ref 7–18)
BUN/CREAT SERPL: 24.1 (ref 10–20)
CALCIUM BLD-MCNC: 8.7 MG/DL (ref 8.5–10.1)
CHLORIDE SERPL-SCNC: 111 MMOL/L (ref 98–112)
CLARITY UR REFRACT.AUTO: CLEAR
CO2 SERPL-SCNC: 22 MMOL/L (ref 21–32)
COLOR UR AUTO: YELLOW
CREAT BLD-MCNC: 1.45 MG/DL (ref 0.7–1.3)
DEPRECATED RDW RBC AUTO: 41.1 FL (ref 35.1–46.3)
EOSINOPHIL # BLD AUTO: 0.17 X10(3) UL (ref 0–0.7)
EOSINOPHIL NFR BLD AUTO: 1.4 %
ERYTHROCYTE [DISTWIDTH] IN BLOOD BY AUTOMATED COUNT: 12.7 % (ref 11–15)
GLOBULIN PLAS-MCNC: 3.8 G/DL (ref 2.8–4.4)
GLUCOSE BLD-MCNC: 92 MG/DL (ref 70–99)
GLUCOSE UR STRIP.AUTO-MCNC: NEGATIVE MG/DL
HCT VFR BLD AUTO: 34.7 % (ref 39–53)
HGB BLD-MCNC: 11.2 G/DL (ref 13–17.5)
IMM GRANULOCYTES # BLD AUTO: 0.04 X10(3) UL (ref 0–1)
IMM GRANULOCYTES NFR BLD: 0.3 %
KETONES UR STRIP.AUTO-MCNC: NEGATIVE MG/DL
LEUKOCYTE ESTERASE UR QL STRIP.AUTO: NEGATIVE
LYMPHOCYTES # BLD AUTO: 2.13 X10(3) UL (ref 1–4)
LYMPHOCYTES NFR BLD AUTO: 17.7 %
M PROTEIN MFR SERPL ELPH: 6.8 G/DL (ref 6.4–8.2)
MCH RBC QN AUTO: 28.6 PG (ref 26–34)
MCHC RBC AUTO-ENTMCNC: 32.3 G/DL (ref 31–37)
MCV RBC AUTO: 88.5 FL (ref 80–100)
MONOCYTES # BLD AUTO: 1.28 X10(3) UL (ref 0.1–1)
MONOCYTES NFR BLD AUTO: 10.6 %
NEUTROPHILS # BLD AUTO: 8.34 X10 (3) UL (ref 1.5–7.7)
NEUTROPHILS # BLD AUTO: 8.34 X10(3) UL (ref 1.5–7.7)
NEUTROPHILS NFR BLD AUTO: 69.5 %
NITRITE UR QL STRIP.AUTO: NEGATIVE
OSMOLALITY SERPL CALC.SUM OF ELEC: 296 MOSM/KG (ref 275–295)
PH UR STRIP.AUTO: 5 [PH] (ref 4.5–8)
PLATELET # BLD AUTO: 239 10(3)UL (ref 150–450)
POTASSIUM SERPL-SCNC: 4.4 MMOL/L (ref 3.5–5.1)
PROT UR STRIP.AUTO-MCNC: 100 MG/DL
RBC # BLD AUTO: 3.92 X10(6)UL (ref 3.8–5.8)
RBC #/AREA URNS AUTO: >10 /HPF
SODIUM SERPL-SCNC: 139 MMOL/L (ref 136–145)
SP GR UR STRIP.AUTO: 1.01 (ref 1–1.03)
TROPONIN I SERPL-MCNC: <0.045 NG/ML (ref ?–0.04)
UROBILINOGEN UR STRIP.AUTO-MCNC: <2 MG/DL
WBC # BLD AUTO: 12 X10(3) UL (ref 4–11)

## 2020-01-09 PROCEDURE — 84484 ASSAY OF TROPONIN QUANT: CPT | Performed by: NURSE PRACTITIONER

## 2020-01-09 PROCEDURE — 70450 CT HEAD/BRAIN W/O DYE: CPT | Performed by: NURSE PRACTITIONER

## 2020-01-09 PROCEDURE — 93005 ELECTROCARDIOGRAM TRACING: CPT

## 2020-01-09 PROCEDURE — 99285 EMERGENCY DEPT VISIT HI MDM: CPT

## 2020-01-09 PROCEDURE — 85025 COMPLETE CBC W/AUTO DIFF WBC: CPT

## 2020-01-09 PROCEDURE — 71045 X-RAY EXAM CHEST 1 VIEW: CPT | Performed by: NURSE PRACTITIONER

## 2020-01-09 PROCEDURE — 36415 COLL VENOUS BLD VENIPUNCTURE: CPT

## 2020-01-09 PROCEDURE — 73562 X-RAY EXAM OF KNEE 3: CPT | Performed by: NURSE PRACTITIONER

## 2020-01-09 PROCEDURE — 93971 EXTREMITY STUDY: CPT | Performed by: NURSE PRACTITIONER

## 2020-01-09 PROCEDURE — 81001 URINALYSIS AUTO W/SCOPE: CPT | Performed by: NURSE PRACTITIONER

## 2020-01-09 PROCEDURE — 93010 ELECTROCARDIOGRAM REPORT: CPT

## 2020-01-09 PROCEDURE — 80053 COMPREHEN METABOLIC PANEL: CPT

## 2020-01-09 PROCEDURE — 80053 COMPREHEN METABOLIC PANEL: CPT | Performed by: EMERGENCY MEDICINE

## 2020-01-09 PROCEDURE — 85025 COMPLETE CBC W/AUTO DIFF WBC: CPT | Performed by: EMERGENCY MEDICINE

## 2020-01-09 RX ORDER — METOPROLOL SUCCINATE 25 MG/1
25 TABLET, EXTENDED RELEASE ORAL ONCE
Status: COMPLETED | OUTPATIENT
Start: 2020-01-09 | End: 2020-01-09

## 2020-01-09 NOTE — ED INITIAL ASSESSMENT (HPI)
Patient sent here by primary care MD d/t increased falls in the last few days. Patient unsure why he is falling. He does not feel his legs give out, he does not believe he is passing out. Patient denies hitting head or LOC when fall.  Patient on baby aspiri

## 2020-01-09 NOTE — ED PROVIDER NOTES
Patient Seen in: [facility] Emergency Department      History   Patient presents with:  Hypertension  Syncope    Stated Complaint:     HPI  78year old male with history of atrial flutter on xarelto, DVT, CAD with MI, nephrotic syndrome, PVC's prese CORONARY ANGIOPLASTY      early 1990's    • CHOLECYSTECTOMY     • COLONOSCOPY  9/8/2004, 2011    x2   • OPEN HEART SURGERY (PBP)     • ROBOT-ASSISTED LAPAROSCOPIC NEPHRECTOMY Left 7/25/2014    Performed by Marybel Torres MD at [facility]   • SKIN SURGERY No nystagmus. Conjunctiva/sclera: Conjunctivae normal.      Pupils: Pupils are equal, round, and reactive to light. Neck:      Musculoskeletal: Normal range of motion and neck supple. No neck rigidity or muscular tenderness.    Cardiovascular:      R components:    Blood Urine Moderate (*)     Protein Urine 100  (*)     RBC URINE >10 (*)     Mucous Urine 1+ (*)     All other components within normal limits   CBC W/ DIFFERENTIAL - Abnormal; Notable for the following components:    WBC 12.0 (*)     HGB 1 fractures seen. No dislocation seen. Postoperative changes with clips in the medial aspect of the leg.   Dictated by: Ryan Choi MD on 1/09/2020 at 18:36     Approved by: Ryan Chio MD on 1/09/2020 at 18:37          Ct Brain Or Head ([address])    R ultrasound was used to evaluate the lower extremity venous system. B-mode two-dimensional images of the vascular structures, Doppler spectral analysis, and color flow. Doppler imaging were performed.   The following veins were imaged:  Common, deep, and ayala outpatient basis. Patient was offered to be admitted and him and his wife refused at this time. Return precautions were discussed. Attending agrees with plan of care.               Disposition and Plan     Clinical Impression:  Syncope, near  (primary en

## 2020-01-09 NOTE — TELEPHONE ENCOUNTER
Spoke with Lou Sheikh (Spouse)   Yesterday:  All three times have    Patients legs gave out and fell to the ground  After exertion 2 times. After patient gets up he reports feeling fine.    Reporting he just falls to the ground    Happened December 31st-Patient

## 2020-01-10 LAB
ATRIAL RATE: 72 BPM
P AXIS: 61 DEGREES
P-R INTERVAL: 182 MS
Q-T INTERVAL: 388 MS
QRS DURATION: 100 MS
QTC CALCULATION (BEZET): 424 MS
R AXIS: 23 DEGREES
T AXIS: 36 DEGREES
VENTRICULAR RATE: 72 BPM

## 2020-01-10 NOTE — ED NOTES
In CT at this time. Care endorsed to [facility], [address].

## 2020-01-10 NOTE — ED NOTES
BP noted to be elevated. States normally takes Metoprolol in the evening. Alejandrina NP, notified, orders received.

## 2020-01-10 NOTE — ED PROVIDER NOTES
Patient seen and examined with PA. Plan of care discussed. Chart reviewed and agree with findings. Patient here for 3 episodes of either syncope or near syncope that started about a week ago. Each episode involved a walking.   Each time, he felt li

## 2020-01-16 ENCOUNTER — APPOINTMENT (OUTPATIENT)
Dept: ULTRASOUND IMAGING | Facility: HOSPITAL | Age: 80
End: 2020-01-16
Attending: INTERNAL MEDICINE
Payer: MEDICARE

## 2020-01-16 ENCOUNTER — APPOINTMENT (OUTPATIENT)
Dept: CV DIAGNOSTICS | Facility: HOSPITAL | Age: 80
End: 2020-01-16
Attending: INTERNAL MEDICINE
Payer: MEDICARE

## 2020-01-16 ENCOUNTER — HOSPITAL ENCOUNTER (OUTPATIENT)
Facility: HOSPITAL | Age: 80
Setting detail: OBSERVATION
LOS: 1 days | Discharge: HOME | End: 2020-01-18
Attending: INTERNAL MEDICINE | Admitting: INTERNAL MEDICINE
Payer: MEDICARE

## 2020-01-16 DIAGNOSIS — I48.92 ATRIAL FLUTTER, PAROXYSMAL (HCC): Primary | ICD-10-CM

## 2020-01-16 DIAGNOSIS — I25.10 CORONARY ARTERY DISEASE INVOLVING NATIVE CORONARY ARTERY OF NATIVE HEART WITHOUT ANGINA PECTORIS: ICD-10-CM

## 2020-01-16 LAB
ALBUMIN SERPL-MCNC: 3.1 G/DL (ref 3.4–5)
ALBUMIN/GLOB SERPL: 0.7 {RATIO} (ref 1–2)
ALP LIVER SERPL-CCNC: 69 U/L (ref 45–117)
ALT SERPL-CCNC: 24 U/L (ref 16–61)
ANION GAP SERPL CALC-SCNC: 6 MMOL/L (ref 0–18)
AST SERPL-CCNC: 22 U/L (ref 15–37)
BASOPHILS # BLD AUTO: 0.04 X10(3) UL (ref 0–0.2)
BASOPHILS NFR BLD AUTO: 0.4 %
BILIRUB SERPL-MCNC: 0.5 MG/DL (ref 0.1–2)
BUN BLD-MCNC: 26 MG/DL (ref 7–18)
BUN/CREAT SERPL: 19.1 (ref 10–20)
CALCIUM BLD-MCNC: 9.1 MG/DL (ref 8.5–10.1)
CHLORIDE SERPL-SCNC: 109 MMOL/L (ref 98–112)
CO2 SERPL-SCNC: 25 MMOL/L (ref 21–32)
CREAT BLD-MCNC: 1.36 MG/DL (ref 0.7–1.3)
DEPRECATED RDW RBC AUTO: 41.2 FL (ref 35.1–46.3)
EOSINOPHIL # BLD AUTO: 0.08 X10(3) UL (ref 0–0.7)
EOSINOPHIL NFR BLD AUTO: 0.7 %
ERYTHROCYTE [DISTWIDTH] IN BLOOD BY AUTOMATED COUNT: 12.6 % (ref 11–15)
GLOBULIN PLAS-MCNC: 4.3 G/DL (ref 2.8–4.4)
GLUCOSE BLD-MCNC: 91 MG/DL (ref 70–99)
HAV IGM SER QL: 2 MG/DL (ref 1.6–2.6)
HCT VFR BLD AUTO: 36.7 % (ref 39–53)
HGB BLD-MCNC: 11.8 G/DL (ref 13–17.5)
IMM GRANULOCYTES # BLD AUTO: 0.06 X10(3) UL (ref 0–1)
IMM GRANULOCYTES NFR BLD: 0.5 %
INR BLD: 1.42 (ref 0.9–1.1)
LYMPHOCYTES # BLD AUTO: 1.75 X10(3) UL (ref 1–4)
LYMPHOCYTES NFR BLD AUTO: 15.5 %
M PROTEIN MFR SERPL ELPH: 7.4 G/DL (ref 6.4–8.2)
MCH RBC QN AUTO: 29 PG (ref 26–34)
MCHC RBC AUTO-ENTMCNC: 32.2 G/DL (ref 31–37)
MCV RBC AUTO: 90.2 FL (ref 80–100)
MONOCYTES # BLD AUTO: 0.79 X10(3) UL (ref 0.1–1)
MONOCYTES NFR BLD AUTO: 7 %
NEUTROPHILS # BLD AUTO: 8.6 X10 (3) UL (ref 1.5–7.7)
NEUTROPHILS # BLD AUTO: 8.6 X10(3) UL (ref 1.5–7.7)
NEUTROPHILS NFR BLD AUTO: 75.9 %
OSMOLALITY SERPL CALC.SUM OF ELEC: 294 MOSM/KG (ref 275–295)
PLATELET # BLD AUTO: 246 10(3)UL (ref 150–450)
POTASSIUM SERPL-SCNC: 4.7 MMOL/L (ref 3.5–5.1)
PSA SERPL DL<=0.01 NG/ML-MCNC: 18.1 SECONDS (ref 12.5–14.7)
RBC # BLD AUTO: 4.07 X10(6)UL (ref 3.8–5.8)
SODIUM SERPL-SCNC: 140 MMOL/L (ref 136–145)
TROPONIN I SERPL-MCNC: <0.045 NG/ML (ref ?–0.04)
TROPONIN I SERPL-MCNC: <0.045 NG/ML (ref ?–0.04)
WBC # BLD AUTO: 11.3 X10(3) UL (ref 4–11)

## 2020-01-16 PROCEDURE — 93880 EXTRACRANIAL BILAT STUDY: CPT | Performed by: INTERNAL MEDICINE

## 2020-01-16 PROCEDURE — 99220 INITIAL OBSERVATION CARE,LEVL III: CPT | Performed by: INTERNAL MEDICINE

## 2020-01-16 PROCEDURE — 93306 TTE W/DOPPLER COMPLETE: CPT | Performed by: INTERNAL MEDICINE

## 2020-01-16 RX ORDER — METOPROLOL SUCCINATE 25 MG/1
25 TABLET, EXTENDED RELEASE ORAL
Status: DISCONTINUED | OUTPATIENT
Start: 2020-01-16 | End: 2020-01-17

## 2020-01-16 RX ORDER — ACETAMINOPHEN 325 MG/1
650 TABLET ORAL EVERY 6 HOURS PRN
Status: DISCONTINUED | OUTPATIENT
Start: 2020-01-16 | End: 2020-01-18

## 2020-01-16 RX ORDER — ASPIRIN 81 MG/1
81 TABLET, CHEWABLE ORAL DAILY
Status: DISCONTINUED | OUTPATIENT
Start: 2020-01-16 | End: 2020-01-18

## 2020-01-16 RX ORDER — ATORVASTATIN CALCIUM 10 MG/1
10 TABLET, FILM COATED ORAL NIGHTLY
Status: DISCONTINUED | OUTPATIENT
Start: 2020-01-16 | End: 2020-01-18

## 2020-01-16 RX ORDER — SODIUM CHLORIDE 9 MG/ML
INJECTION, SOLUTION INTRAVENOUS CONTINUOUS
Status: DISCONTINUED | OUTPATIENT
Start: 2020-01-16 | End: 2020-01-18

## 2020-01-16 RX ORDER — SODIUM CHLORIDE 0.9 % (FLUSH) 0.9 %
10 SYRINGE (ML) INJECTION EVERY 12 HOURS
Status: DISCONTINUED | OUTPATIENT
Start: 2020-01-16 | End: 2020-01-18

## 2020-01-16 RX ORDER — LISINOPRIL 20 MG/1
20 TABLET ORAL NIGHTLY
Status: DISCONTINUED | OUTPATIENT
Start: 2020-01-16 | End: 2020-01-18

## 2020-01-16 RX ORDER — HYDRALAZINE HYDROCHLORIDE 50 MG/1
50 TABLET, FILM COATED ORAL 2 TIMES DAILY
Status: DISCONTINUED | OUTPATIENT
Start: 2020-01-16 | End: 2020-01-17

## 2020-01-16 NOTE — PROGRESS NOTES
Cardiology follow-up  Patient with history of remote inferior wall MI, post CABG, hypertension, CHF admitted with recurrent post exertional syncope. Etiology unknown–arrhythmia, ischemia (doubt), orthostasis.   Plan  -Check orthostatic blood pressures  -2D

## 2020-01-16 NOTE — PROGRESS NOTES
01/16/20 1618   Clinical Encounter Type   Continue Visiting No   Amish Encounters   Spiritual Requests During Visit / Hospitalization Religion Communion;Communion   Patient Spiritual Encounters   Spiritual Assessment Completed No

## 2020-01-16 NOTE — H&P
[facility] HOSPITALIST                                                               History & Physical         Ori Bernal Patient Status:  Inpatient    1940 MRN [id number]   [facility] 8NE-A Attending Jarred West MD   [facility] Day TMJ   • HEART ATTACK    • HIGH BLOOD PRESSURE    • HIGH CHOLESTEROL    • Hypercholesterolemia 4/7/2014   • Long term (current) use of anticoagulants 4/7/2014   • Nephrotic syndrome    • Other and unspecified hyperlipidemia    • PVC's (premature ventricular TABLET BY MOUTH EVERY DAY, Disp: 90 tablet, Rfl: 1, Taking  TAMSULOSIN HCL 0.4 MG Oral Cap, TAKE 2 CAPSULES (0.8 MG TOTAL) BY MOUTH NIGHTLY., Disp: 180 capsule, Rfl: 1, Taking  nitroGLYCERIN 0.4 MG Sublingual SL Tab, Place 1 tablet (0.4 mg total) under the orthostatics  2. Fall precautions  3. 2D echo  4. Cardiology on consult  5. CT brain done recently on 1/9/2020- for any acute changes, no bleed or stroke. Stable chronic changes. 6. Left lower extremity venous duplex done on 1/9/2020 negative for DVT  7.

## 2020-01-16 NOTE — PROGRESS NOTES
(+) orthos, pt denies dizziness, pt does have hxt of multiple falls recently from ambulating long distances       01/16/20 1449 01/16/20 1451 01/16/20 1454   Vital Signs   BP (!) 164/78 160/83 124/67   BP Location Right arm Right arm Right arm   BP Method

## 2020-01-16 NOTE — PLAN OF CARE
NURSING ADMISSION NOTE      Patient admitted via wheel chair as direct admit from dr. Marysol Ji office. Oriented to room. Safety precautions initiated. Bed in low position. Call light in reach. Updated history and pta med. Gave report to [facility].

## 2020-01-17 ENCOUNTER — APPOINTMENT (OUTPATIENT)
Dept: CV DIAGNOSTICS | Facility: HOSPITAL | Age: 80
End: 2020-01-17
Attending: INTERNAL MEDICINE
Payer: MEDICARE

## 2020-01-17 PROBLEM — R55 SYNCOPE: Status: ACTIVE | Noted: 2020-01-17

## 2020-01-17 LAB
ANION GAP SERPL CALC-SCNC: 4 MMOL/L (ref 0–18)
BASOPHILS # BLD AUTO: 0.05 X10(3) UL (ref 0–0.2)
BASOPHILS NFR BLD AUTO: 0.5 %
BUN BLD-MCNC: 27 MG/DL (ref 7–18)
BUN/CREAT SERPL: 21.3 (ref 10–20)
CALCIUM BLD-MCNC: 8.7 MG/DL (ref 8.5–10.1)
CHLORIDE SERPL-SCNC: 114 MMOL/L (ref 98–112)
CO2 SERPL-SCNC: 24 MMOL/L (ref 21–32)
CREAT BLD-MCNC: 1.27 MG/DL (ref 0.7–1.3)
DEPRECATED RDW RBC AUTO: 41.3 FL (ref 35.1–46.3)
EOSINOPHIL # BLD AUTO: 0.2 X10(3) UL (ref 0–0.7)
EOSINOPHIL NFR BLD AUTO: 1.9 %
ERYTHROCYTE [DISTWIDTH] IN BLOOD BY AUTOMATED COUNT: 12.6 % (ref 11–15)
GLUCOSE BLD-MCNC: 92 MG/DL (ref 70–99)
HCT VFR BLD AUTO: 32.1 % (ref 39–53)
HGB BLD-MCNC: 10.1 G/DL (ref 13–17.5)
IMM GRANULOCYTES # BLD AUTO: 0.04 X10(3) UL (ref 0–1)
IMM GRANULOCYTES NFR BLD: 0.4 %
LYMPHOCYTES # BLD AUTO: 2.62 X10(3) UL (ref 1–4)
LYMPHOCYTES NFR BLD AUTO: 25.4 %
MCH RBC QN AUTO: 28.2 PG (ref 26–34)
MCHC RBC AUTO-ENTMCNC: 31.5 G/DL (ref 31–37)
MCV RBC AUTO: 89.7 FL (ref 80–100)
MONOCYTES # BLD AUTO: 0.99 X10(3) UL (ref 0.1–1)
MONOCYTES NFR BLD AUTO: 9.6 %
NEUTROPHILS # BLD AUTO: 6.42 X10 (3) UL (ref 1.5–7.7)
NEUTROPHILS # BLD AUTO: 6.42 X10(3) UL (ref 1.5–7.7)
NEUTROPHILS NFR BLD AUTO: 62.2 %
OSMOLALITY SERPL CALC.SUM OF ELEC: 299 MOSM/KG (ref 275–295)
PLATELET # BLD AUTO: 227 10(3)UL (ref 150–450)
POTASSIUM SERPL-SCNC: 4.5 MMOL/L (ref 3.5–5.1)
RBC # BLD AUTO: 3.58 X10(6)UL (ref 3.8–5.8)
SODIUM SERPL-SCNC: 142 MMOL/L (ref 136–145)
TROPONIN I SERPL-MCNC: <0.045 NG/ML (ref ?–0.04)
WBC # BLD AUTO: 10.3 X10(3) UL (ref 4–11)

## 2020-01-17 PROCEDURE — 78452 HT MUSCLE IMAGE SPECT MULT: CPT | Performed by: INTERNAL MEDICINE

## 2020-01-17 PROCEDURE — 93018 CV STRESS TEST I&R ONLY: CPT | Performed by: INTERNAL MEDICINE

## 2020-01-17 PROCEDURE — 93017 CV STRESS TEST TRACING ONLY: CPT | Performed by: INTERNAL MEDICINE

## 2020-01-17 PROCEDURE — 99225 SUBSEQUENT OBSERVATION CARE: CPT | Performed by: INTERNAL MEDICINE

## 2020-01-17 RX ORDER — HYDRALAZINE HYDROCHLORIDE 50 MG/1
100 TABLET, FILM COATED ORAL 2 TIMES DAILY
Status: DISCONTINUED | OUTPATIENT
Start: 2020-01-17 | End: 2020-01-18

## 2020-01-17 RX ORDER — METOPROLOL SUCCINATE 50 MG/1
50 TABLET, EXTENDED RELEASE ORAL
Status: DISCONTINUED | OUTPATIENT
Start: 2020-01-17 | End: 2020-01-18

## 2020-01-17 RX ORDER — HYDRALAZINE HYDROCHLORIDE 50 MG/1
100 TABLET, FILM COATED ORAL 2 TIMES DAILY
Status: DISCONTINUED | OUTPATIENT
Start: 2020-01-17 | End: 2020-01-17

## 2020-01-17 NOTE — PLAN OF CARE
Preliminary stress test results per Dr. Desouza Si: EF 61% with normal perfusion. Discussed case with Dr. Destiny Gomez, continue IV fluids, follow orthostatics, possibly home tomorrow, 1/18/20.

## 2020-01-17 NOTE — PLAN OF CARE
Patient laying in bed, denies CP, SOB and dizziness. Orthostatics completed this AM, +, patient denies dizziness, IVF's infusing. Patient AOx4; forgetful at times. NSR on cardiac monitor. No edema noted. Lungs diminished bilaterally, RA, no cough noted.  Pa

## 2020-01-17 NOTE — PROCEDURES
[address]      PATIENT'S NAME: Eric Parent   ATTENDING PHYSICIAN: Bob Jesus M.D.    PATIENT ACCOUNT #: [de-identified] LOCATION: [address] [place] [address]   MEDICAL RECORD #: [id number] DATE OF BI

## 2020-01-17 NOTE — CM/SW NOTE
Patient failed inpatient criteria. Second level of review completed and supports observation. UR committee in agreement. Discussed with Karen Linares  who approves observation status. MOON given to the patient and order written.

## 2020-01-17 NOTE — PROGRESS NOTES
[facility]  Progress Note    Blanche Mendoza Patient Status:  Inpatient    1940 MRN [id number]   [facility] 8NE-A Attending Jayashree Gooden MD   Hosp Day # 1 PCP Greer Wong MD     CC: Dizziness, presyncopal episodes and fall Component Value Date    WBC 10.3 01/17/2020    HGB 10.1 01/17/2020    HCT 32.1 01/17/2020    .0 01/17/2020    CREATSERUM 1.27 01/17/2020    BUN 27 01/17/2020     01/17/2020    K 4.5 01/17/2020     01/17/2020    CO2 24.0 01/17/2020    G Velocity:  60.44 cm/s              Left Distal ICA Peak Systolic Velocity:  36.00 cm/s              Left ICA/CCA Velocity Ratio:  0.77     The vertebral arteries demonstrate antegrade flow.     =====  CONCLUSION:  Based on velocity criteria there is no queta home  2. Cardiac medications per cardiology  3. Follow blood pressure  4. Hyperlipidemia  1. Continue home medication statin  5. Paroxysmal atrial fibrillation  1. Patient takes Xarelto and metoprolol at home for this  6. BPH  1.  Continue home medication

## 2020-01-17 NOTE — PROGRESS NOTES
CARDIODIAGNOSTICS PRELIMINARY REPORT    Lexiscan Nuclear Stress Test    Patient denied any cardiac symptoms with Lexiscan infusion. Rare isolated PVC's and PAC's. Nuclear images pending.     Naun Bolivar RN ~ [facility]

## 2020-01-17 NOTE — PROGRESS NOTES
01/17/20 0629 01/17/20 0630   Vital Signs   Pulse 85 92   BP (!) 180/87 (!) 151/96   Patient Position Lying Sitting

## 2020-01-17 NOTE — PROGRESS NOTES
[facility] Cardiology Progress Note - Hiwot Sigala Patient Status:  Observation    1940 MRN [id number]   [facility] 8NE-A Attending Herlinda Ward MD   Hosp Day # 1 PCP Stef Enriquez MD     Subjective:  Lalo f (94 kg)  01/16/20 1238 : 210 lb (95.3 kg)  01/09/20 1650 : 216 lb (98 kg)      Tele: NSR    Physical Exam:    General: Alert and oriented x 3. No apparent distress. No respiratory or constitutional distress.   HEENT: Normocephalic, anicteric sclera, neck ayala Blocker)  Rivaroxaban (XARELTO) tab 20 mg, 20 mg, Oral, Daily with food        ROS:  General Health: otherwise feels well, weight stable  Constitutiona: no recent fevers  Skin: denies any unusual skin lesions or rashes  Eyes: no visual complaints or defici

## 2020-01-17 NOTE — PLAN OF CARE
Patient in NSR. IV fluids infusing at 50cc/hr. Patient up with assist to bathroom. Fall risk, foot in the door of the bathroom for assistance. Stress incontinence. Bed alarm on, frequent rounds. Blood pressure elevated while lying.  Slightly lower on arrhythmias or at baseline  Description  INTERVENTIONS:  - Continuous cardiac monitoring, monitor vital signs, obtain 12 lead EKG if indicated  - Evaluate effectiveness of antiarrhythmic and heart rate control medications as ordered  - Initiate emergency m

## 2020-01-17 NOTE — OCCUPATIONAL THERAPY NOTE
OCCUPATIONAL THERAPY QUICK EVALUATION - INPATIENT    Room Number: 7006/6436-W  Evaluation Date: 1/17/2020     Type of Evaluation: Quick Eval  Presenting Problem: presyncope, falls    Physician Order: IP Consult to Occupational Therapy  Reason for Therapy: triple bypass   • CATARACT     • CATH PERCUTANEOUS  TRANSLUMINAL CORONARY ANGIOPLASTY      early 1990's    • CHOLECYSTECTOMY     • COLONOSCOPY  9/8/2004, 2011    x2   • OPEN HEART SURGERY (PBP)     • ROBOT-ASSISTED LAPAROSCOPIC NEPHRECTOMY Left 7/25/201 Bathing (including washing, rinsing, drying)?: None  -   Toileting, which includes using toilet, bedpan or urinal? : None  -   Putting on and taking off regular upper body clothing?: None  -   Taking care of personal grooming such as brushing teeth?: None occupational profile, problem-focused assessments, limited treatment options    Overall Complexity  LOW     OT Discharge Recommendations: Home       PLAN   Patient has been evaluated and presents with no skilled Occupational Therapy needs at this time.   Pa

## 2020-01-17 NOTE — PROGRESS NOTES
01/17/20 1239 01/17/20 1242 01/17/20 1244   Vital Signs   Pulse 70 73 78   Heart Rate Source Monitor  --   --    Resp 18  --   --    Respiratory Quality Normal  --   --    BP (!) 187/87 (!) 186/88 (!) 176/74   BP Location Right arm Right arm Right arm

## 2020-01-17 NOTE — HOME CARE LIAISON
Received referral from Amy Zambrano. Patient is not agreeable to [facility] services at discharge. Any questions addressed.

## 2020-01-17 NOTE — PROGRESS NOTES
NURSING ADMISSION NOTE      Patient admitted via Wheelchair  Oriented to room. Safety precautions initiated. Bed in low position.   Call light in reach  IV placed    Assumed care of patient @ 1500, transfer from Dr. Josiah Sellers office for tele monitorin

## 2020-01-18 VITALS
HEART RATE: 68 BPM | WEIGHT: 206.81 LBS | SYSTOLIC BLOOD PRESSURE: 172 MMHG | BODY MASS INDEX: 27 KG/M2 | OXYGEN SATURATION: 98 % | RESPIRATION RATE: 18 BRPM | TEMPERATURE: 98 F | DIASTOLIC BLOOD PRESSURE: 87 MMHG

## 2020-01-18 PROCEDURE — 99217 OBSERVATION CARE DISCHARGE: CPT | Performed by: INTERNAL MEDICINE

## 2020-01-18 RX ORDER — HYDRALAZINE HYDROCHLORIDE 100 MG/1
100 TABLET, FILM COATED ORAL 2 TIMES DAILY
Qty: 90 TABLET | Refills: 3 | Status: SHIPPED | OUTPATIENT
Start: 2020-01-18 | End: 2020-01-24

## 2020-01-18 RX ORDER — METOPROLOL SUCCINATE 50 MG/1
50 TABLET, EXTENDED RELEASE ORAL
Qty: 90 TABLET | Refills: 3 | Status: SHIPPED | OUTPATIENT
Start: 2020-01-18 | End: 2020-07-08

## 2020-01-18 NOTE — PLAN OF CARE
Patient laying in bed, denies CP, SOB and dizziness. SBP high, cardiology Dr. Sonya Villarreal aware, AM medications given, will continue to monitor. NSR on cardiac monitor, Dr. Wilemr Marcial of missed beat on ECG alarm review. Patient ambulated in hallways no complaints.  P

## 2020-01-18 NOTE — PLAN OF CARE
A/o x4, forgetful at times. NSR on tele, 96% o2 on RA. Denies any pain or SOB at this time. Incontinent at times, brief in place. 0.9NS running at 50mL/hr. Updated on plan of care, pt verbalized understanding. Call light within reach, bed alarm on.    [address] Progressing     Problem: Impaired Functional Mobility  Goal: Achieve highest/safest level of mobility/gait  Description  Interventions:  - Assess patient's functional ability and stability  - Promote increasing activity/tolerance for mobility and gait  - E

## 2020-01-18 NOTE — PROGRESS NOTES
[facility] Cardiology  Progress Note    Ronald Martínez Patient Status:  Observation    1940 MRN [id number]   [facility] 8NE-A Attending Jazmyn Huang MD   Hosp Day # 1 PCP Trini Trevizo MD     Impression:  1. syncope  - XL) 24 hr tab 50 mg, 50 mg, Oral, 2x Daily(Beta Blocker)  hydrALAzine HCl (APRESOLINE) tab 100 mg, 100 mg, Oral, BID  0.9% NaCl infusion, , Intravenous, Continuous  acetaminophen (TYLENOL) tab 650 mg, 650 mg, Oral, Q6H PRN  Normal Saline Flush 0.9 % inject

## 2020-01-18 NOTE — DISCHARGE SUMMARY
[facility]  Discharge Summary    Radha Amaro Patient Status:  Observation    1940 MRN [id number]   [facility] 8NE-A Attending No att. providers found   Hosp Day # 1 PCP Molly Cheadle, MD     Date of Admission: 2020 on admission. Seen by cardiologist.  EKG done was normal with no evidence of focal slowing or seizure activity. 2D echo done showed  Conclusions:     1. Left ventricle:  The cavity size was at the upper limits of normal.     Systolic function was normal. much to take      Take 1 tablet (100 mg total) by mouth 2 (two) times daily. Quantity:  90 tablet  Refills:  3     Metoprolol Succinate ER 50 MG Tb24  Commonly known as:   Toprol XL  What changed:    · how much to take  · how to take this  · when to take monitoring program data reviewed in epic before prescribing narcotics/controlled substances: Not applicable    Follow up Visits:  Follow-up with Shanelle London MD in 1 week    Pablo Echevarria MD  [address] [place] [id number] [phone]

## 2020-01-18 NOTE — PLAN OF CARE
Patient tele D/C, IV discontinued with catheter intact. Patient denies CP, SOB, dizziness, or palpitations. Patient denies calf tenderness. Patient discharge instructions reviewed with patient and spouse, verbalize understanding.  Patient medication and the baseline  Description  INTERVENTIONS:  - Continuous cardiac monitoring, monitor vital signs, obtain 12 lead EKG if indicated  - Evaluate effectiveness of antiarrhythmic and heart rate control medications as ordered  - Initiate emergency measures for life t

## 2020-01-18 NOTE — PROGRESS NOTES
01/18/20 0537 01/18/20 0539 01/18/20 0541   Vital Signs   BP (!) 188/89 (!) 171/89 148/81   BP Location Right arm Right arm Right arm   BP Method Automatic Automatic Automatic   Patient Position Lying Sitting Standing     orthostatics

## 2020-01-18 NOTE — PROGRESS NOTES
[facility]  Progress Note    Pari Nunez Patient Status:  Inpatient    1940 MRN [id number]   [facility] 8NE-A Attending Laura Hare MD   Hosp Day # 1 PCP Venancio Rodriguez MD     CC: Dizziness, presyncopal episodes and fall home     TECHNIQUE:  Real-time gray-scale and duplex ultrasound was used to evaluate the bilateral extracranial carotid and vertebral arteries.   B-mode 2-dimensional images of the vascular structures, Doppler spectral analysis, and color flow Doppler imagi BID  0.9% NaCl infusion, , Intravenous, Continuous  acetaminophen (TYLENOL) tab 650 mg, 650 mg, Oral, Q6H PRN  Normal Saline Flush 0.9 % injection 10 mL, 10 mL, Intravenous, Q12H  aspirin chewable tab 81 mg, 81 mg, Oral, Daily  atorvastatin (LIPITOR) tab 1 as needed at home  1.  Recent lower extremity venous duplex left leg negative for DVT        Quality:  · DVT Prophylaxis: SCD, on xarelto at home  · CODE status: full  · Sparks: no    Will the patient be referred to [facility] on discharge?:  Yes  Estimated date of

## 2020-01-19 DIAGNOSIS — R35.0 BENIGN PROSTATIC HYPERPLASIA WITH URINARY FREQUENCY: ICD-10-CM

## 2020-01-19 DIAGNOSIS — N04.9 NEPHROTIC SYNDROME: ICD-10-CM

## 2020-01-19 DIAGNOSIS — N40.1 BENIGN PROSTATIC HYPERPLASIA WITH URINARY FREQUENCY: ICD-10-CM

## 2020-01-19 RX ORDER — TAMSULOSIN HYDROCHLORIDE 0.4 MG/1
0.8 CAPSULE ORAL NIGHTLY
Qty: 180 CAPSULE | Refills: 1 | Status: SHIPPED | OUTPATIENT
Start: 2020-01-19 | End: 2020-02-07

## 2020-01-20 ENCOUNTER — PATIENT OUTREACH (OUTPATIENT)
Dept: CASE MANAGEMENT | Age: 80
End: 2020-01-20

## 2020-01-20 ENCOUNTER — TELEPHONE (OUTPATIENT)
Dept: INTERNAL MEDICINE CLINIC | Facility: CLINIC | Age: 80
End: 2020-01-20

## 2020-01-20 DIAGNOSIS — Z02.9 ENCOUNTERS FOR UNSPECIFIED ADMINISTRATIVE PURPOSE: ICD-10-CM

## 2020-01-20 DIAGNOSIS — R55 SYNCOPE, UNSPECIFIED SYNCOPE TYPE: ICD-10-CM

## 2020-01-20 PROCEDURE — 1111F DSCHRG MED/CURRENT MED MERGE: CPT

## 2020-01-20 RX ORDER — FINASTERIDE 5 MG/1
TABLET, FILM COATED ORAL
Qty: 90 TABLET | Refills: 3 | Status: SHIPPED | OUTPATIENT
Start: 2020-01-20 | End: 2020-01-24

## 2020-01-20 NOTE — TELEPHONE ENCOUNTER
Spoke to pt for TCM today. Pt has a TCM-HFU appt scheduled for 1/27/2020. TCM/HFU appt recommended by 1/25/2020 as pt is a high risk for readmission.  [facility] unable to schedule a sooner date due to schedule availability (there is an open TCM-HFU slot on 1/23

## 2020-01-20 NOTE — PROGRESS NOTES
Initial Post Discharge Follow Up   Discharge Date: 1/18/20  Contact Date: 1/20/2020    Consent Verification:  Assessment Completed With: Patient  HIPAA Verified?   Yes    Discharge Dx:     Presyncopal episodes  CAD   Hypertension with orthostatic hypoten yes  • How well were your discharge instructions explained to you?   o On a scale of 1-5   1- Very Poor and 5- Very well   o Very well  • Do you have any questions about your discharge instructions?   No  • Before leaving the hospital was your diagnoses exp medication? No  • Did you  your discharge medications when you left the hospital? No; The patient will be picking up the new Rx for hydralazine tomorrow. Has been taking the new dose of hydralazine since discharge with past Rx/supply at home.     • [address] [facility] [address]  [address], [address] [facility] [address]  [address] [facility], [address] [address] [phone]    SP CARDIOLOGY  [place] at [address] [place] orders sent to PCP.

## 2020-01-21 NOTE — TELEPHONE ENCOUNTER
Spoke to Pt. And made a Hospital F/U appointment for Friday 1/24/2020 at 3:15 pm with Dr. Nedra Rogers. Appointment for 1/27/2020 canceled. Patient expressed understanding and confirmed his Hospital F/U appointment for 1/24/2020.

## 2020-01-23 NOTE — CONSULTS
Cardiology Consult  Adolfo Rm is a 78year old male. Patient presents with:  Er F/u           ASSESSMENT/PLAN:      Recurrent post exertional syncope. Etiology is unclear. Patient with history of paroxysmal atrial fibrillation.  Possible PAF wi intubation 7/14/93     Dr. Ryan Solorio gave letter - will bring with him   • DVT (deep venous thrombosis) ([facility] 4/7/2014   • Earache       secondary to TMJ   • HEART ATTACK     • HIGH BLOOD PRESSURE     • HIGH CHOLESTEROL     • Hypercholesterolemia 4/7/2014   • L occassional      Drug use: No    Other Topics      Concerns:        Caffeine Concern: Yes        Exercise:  Yes         Allergies:  No Known Allergies   Current Meds:         Current Outpatient Medications   Medication Sig Dispense Refill   • lisinopril 20    /70   Pulse 72   Ht 6' 2\" (1.88 m)   Wt 210 lb (95.3 kg)   BMI 26.96 kg/m²      General: Alert and oriented x 3. No apparent distress. No respiratory or constitutional distress.   HEENT: Normocephalic, anicteric sclera, neck supple, no thyromega

## 2020-01-24 ENCOUNTER — OFFICE VISIT (OUTPATIENT)
Dept: INTERNAL MEDICINE CLINIC | Facility: CLINIC | Age: 80
End: 2020-01-24
Payer: MEDICARE

## 2020-01-24 VITALS
SYSTOLIC BLOOD PRESSURE: 162 MMHG | BODY MASS INDEX: 27.21 KG/M2 | HEIGHT: 74 IN | OXYGEN SATURATION: 98 % | DIASTOLIC BLOOD PRESSURE: 80 MMHG | HEART RATE: 73 BPM | WEIGHT: 212 LBS | TEMPERATURE: 98 F | RESPIRATION RATE: 16 BRPM

## 2020-01-24 DIAGNOSIS — R55 SYNCOPE, UNSPECIFIED SYNCOPE TYPE: ICD-10-CM

## 2020-01-24 DIAGNOSIS — R55 POSTURAL DIZZINESS WITH PRESYNCOPE: ICD-10-CM

## 2020-01-24 DIAGNOSIS — I10 ESSENTIAL HYPERTENSION: ICD-10-CM

## 2020-01-24 DIAGNOSIS — Z09 HOSPITAL DISCHARGE FOLLOW-UP: Primary | ICD-10-CM

## 2020-01-24 DIAGNOSIS — R42 POSTURAL DIZZINESS WITH PRESYNCOPE: ICD-10-CM

## 2020-01-24 PROCEDURE — 99496 TRANSJ CARE MGMT HIGH F2F 7D: CPT | Performed by: INTERNAL MEDICINE

## 2020-01-24 PROCEDURE — 1111F DSCHRG MED/CURRENT MED MERGE: CPT | Performed by: INTERNAL MEDICINE

## 2020-01-24 RX ORDER — HYDRALAZINE HYDROCHLORIDE 100 MG/1
100 TABLET, FILM COATED ORAL 3 TIMES DAILY
Qty: 270 TABLET | Refills: 1 | Status: SHIPPED | OUTPATIENT
Start: 2020-01-24 | End: 2020-07-30

## 2020-01-24 NOTE — PROGRESS NOTES
HPI:    Pari Nunez is a 78year old male here today for hospital follow up.    He was discharged from Inpatient hospital, [facility] to Home   Admission Date: 1/16/20   Discharge Date: 1/18/20  Hospital Discharge Diagnoses (since 12/25/2019) leaned over to the left according to patient's wife at bedside.  No precipitating factors.  Denies any complete loss of consciousness.  Denies any associated headache.  Denies any nausea vomiting.  Denies any abdominal pain.  Denies any dysuria frequency. HCl 100 MG Oral Tab, Take 1 tablet (100 mg total) by mouth 2 (two) times daily. Metoprolol Succinate ER 50 MG Oral Tablet 24 Hr, Take 1 tablet (50 mg total) by mouth 2x Daily(Beta Blocker).   Rivaroxaban (XARELTO) 20 MG Oral Tab, TAKE 1 TABLET BY MOUTH SOFIA disease in his brother. He  reports that he quit smoking about 43 years ago. He has never used smokeless tobacco. He reports current alcohol use. He reports that he does not use drugs.      ROS:   Review of Systems  A comprehensive 14 point review of syst Transitional Care Management Certification:  I certify that the following are true:  Communication with the patient was made within 2 business days of discharge on date above   Medical Decision Making- Based on service period of discharge to 30 days:

## 2020-01-27 PROBLEM — I50.32 CHRONIC DIASTOLIC HEART FAILURE (HCC): Status: ACTIVE | Noted: 2020-01-27

## 2020-02-07 ENCOUNTER — OFFICE VISIT (OUTPATIENT)
Dept: INTERNAL MEDICINE CLINIC | Facility: CLINIC | Age: 80
End: 2020-02-07
Payer: MEDICARE

## 2020-02-07 VITALS
RESPIRATION RATE: 16 BRPM | WEIGHT: 210 LBS | DIASTOLIC BLOOD PRESSURE: 78 MMHG | HEIGHT: 74 IN | SYSTOLIC BLOOD PRESSURE: 130 MMHG | HEART RATE: 84 BPM | BODY MASS INDEX: 26.95 KG/M2 | TEMPERATURE: 97 F | OXYGEN SATURATION: 98 %

## 2020-02-07 DIAGNOSIS — I10 ESSENTIAL HYPERTENSION: Primary | ICD-10-CM

## 2020-02-07 PROCEDURE — 99213 OFFICE O/P EST LOW 20 MIN: CPT | Performed by: INTERNAL MEDICINE

## 2020-02-07 NOTE — PROGRESS NOTES
HPI:    Patient ID: Ori Bernal is a 78year old male. Hypertension       Ori Bernal is a 78year old male. HPI:   Patient presents for recheck of his hypertension.  Pt has been taking medications as instructed, no medication side ef DAY 90 tablet 1   • nitroGLYCERIN 0.4 MG Sublingual SL Tab Place 1 tablet (0.4 mg total) under the tongue every 5 (five) minutes as needed for Chest pain. 25 tablet 3   • Cholecalciferol (VITAMIN D3) 2000 UNITS Oral Tab Take 1 tablet by mouth daily. TOE     • TONSILLECTOMY        Social History:    Social History    Tobacco Use      Smoking status: Former Smoker        Quit date: 1976        Years since quittin.6      Smokeless tobacco: Never Used    Alcohol use:  Yes      Alcohol/week: 0.0 st Tab TAKE 1 TABLET BY MOUTH DAILY WITH FOOD 90 tablet 3   • lisinopril 20 MG Oral Tab Take 20 mg by mouth nightly.    180 tablet 1   • ATORVASTATIN 10 MG Oral Tab TAKE 1 TABLET BY MOUTH EVERY DAY 90 tablet 1   • nitroGLYCERIN 0.4 MG Sublingual SL Tab Place 1

## 2020-02-28 RX ORDER — METOPROLOL SUCCINATE 50 MG/1
TABLET, EXTENDED RELEASE ORAL
Qty: 90 TABLET | Refills: 1 | OUTPATIENT
Start: 2020-02-28

## 2020-03-23 DIAGNOSIS — I10 ESSENTIAL HYPERTENSION: ICD-10-CM

## 2020-03-23 RX ORDER — LISINOPRIL 20 MG/1
TABLET ORAL
Qty: 180 TABLET | Refills: 1 | OUTPATIENT
Start: 2020-03-23

## 2020-03-25 DIAGNOSIS — I10 ESSENTIAL HYPERTENSION: ICD-10-CM

## 2020-03-25 RX ORDER — LISINOPRIL 20 MG/1
20 TABLET ORAL NIGHTLY
Qty: 90 TABLET | Refills: 1 | Status: SHIPPED | OUTPATIENT
Start: 2020-03-25 | End: 2020-09-08

## 2020-05-05 ENCOUNTER — VIRTUAL PHONE E/M (OUTPATIENT)
Dept: INTERNAL MEDICINE CLINIC | Facility: CLINIC | Age: 80
End: 2020-05-05
Payer: MEDICARE

## 2020-05-05 VITALS — SYSTOLIC BLOOD PRESSURE: 131 MMHG | DIASTOLIC BLOOD PRESSURE: 77 MMHG

## 2020-05-05 DIAGNOSIS — I48.92 ATRIAL FLUTTER, PAROXYSMAL (HCC): ICD-10-CM

## 2020-05-05 DIAGNOSIS — I10 ESSENTIAL HYPERTENSION: Primary | ICD-10-CM

## 2020-05-05 DIAGNOSIS — E78.2 MIXED HYPERLIPIDEMIA: ICD-10-CM

## 2020-05-05 DIAGNOSIS — I25.10 CORONARY ARTERY DISEASE INVOLVING NATIVE CORONARY ARTERY OF NATIVE HEART WITHOUT ANGINA PECTORIS: ICD-10-CM

## 2020-05-05 PROCEDURE — 99442 PHONE E/M BY PHYS 11-20 MIN: CPT | Performed by: INTERNAL MEDICINE

## 2020-05-05 NOTE — PROGRESS NOTES
Virtual Telephone Check-In    Ronald Martínez verbally consents to a Virtual/Telephone Check-In visit on 05/05/20. Patient understands and accepts financial responsibility for any deductible, co-insurance and/or co-pays associated with this service.

## 2020-05-05 NOTE — PROGRESS NOTES
HPI:    Patient ID: Rafael Toribio is a 78year old male. Medication Follow-Up       Rafael Toribio is a 78year old male. HPI:   Patient presents for recheck of his hypertension, hyperlipidemia, CAD and aflutter.  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

## 2020-05-07 ENCOUNTER — TELEPHONE (OUTPATIENT)
Dept: INTERNAL MEDICINE CLINIC | Facility: CLINIC | Age: 80
End: 2020-05-07

## 2020-05-07 ENCOUNTER — LAB ENCOUNTER (OUTPATIENT)
Dept: LAB | Age: 80
End: 2020-05-07
Attending: INTERNAL MEDICINE
Payer: MEDICARE

## 2020-05-07 DIAGNOSIS — I10 ESSENTIAL HYPERTENSION: ICD-10-CM

## 2020-05-07 DIAGNOSIS — D72.829 LEUKOCYTOSIS, UNSPECIFIED TYPE: Primary | ICD-10-CM

## 2020-05-07 PROCEDURE — 36415 COLL VENOUS BLD VENIPUNCTURE: CPT

## 2020-05-07 PROCEDURE — 80053 COMPREHEN METABOLIC PANEL: CPT

## 2020-05-07 PROCEDURE — 85025 COMPLETE CBC W/AUTO DIFF WBC: CPT

## 2020-05-07 PROCEDURE — 84443 ASSAY THYROID STIM HORMONE: CPT

## 2020-05-07 NOTE — TELEPHONE ENCOUNTER
----- Message from Gauri Centeno MD sent at 5/7/2020  2:57 PM CDT -----  Recheck cbc as wbc is slightly elevated  Stable anemia  cmp is pending

## 2020-05-08 ENCOUNTER — LAB ENCOUNTER (OUTPATIENT)
Dept: LAB | Age: 80
End: 2020-05-08
Attending: INTERNAL MEDICINE
Payer: MEDICARE

## 2020-05-08 DIAGNOSIS — D72.829 LEUKOCYTOSIS, UNSPECIFIED TYPE: ICD-10-CM

## 2020-05-08 PROCEDURE — 85025 COMPLETE CBC W/AUTO DIFF WBC: CPT

## 2020-05-08 PROCEDURE — 36415 COLL VENOUS BLD VENIPUNCTURE: CPT

## 2020-05-08 NOTE — PHYSICAL THERAPY NOTE
PHYSICAL THERAPY EVALUATION - INPATIENT     Room Number: 0766/7121-O  Evaluation Date: 1/17/2020  Type of Evaluation: Initial  Physician Order: PT Eval and Treat    Presenting Problem: Presyncopal event, Fall  Reason for Therapy: Mobility Dysfunction a 4/7/2014   • Renal disorder    • Shortness of breath    • Syncope 4/7/2014   • Testicular pain     likely from under treated epididymitis   • Unspecified essential hypertension    • Visual impairment     glasses       Past Surgical History  Past Surgical H Mohs Case Number:  home. Overall pt cognitive level seems good, just unaware of deficits  · Arousal/Alertness:  appropriate responses to stimuli  · Attention Span:  appears intact  · Orientation Level:  oriented x4  · Memory:  appears intact  · Following Commands:  follows m Modifier (G-Code): CI    FUNCTIONAL ABILITY STATUS  Gait Assessment   Gait Assistance: Supervision  Distance (ft): 10  Assistive Device: None  Pattern: Within Functional Limits; Shuffle  Stoop/Curb Assistance: Not tested       Skilled Therapy Provided: Chi verdugo admitted on 1/16/2020 for postural dizziness with presyncope. Pertinent comorbidities and personal factors impacting therapy include HTN, CAD, AAA in 2018. Pt is motivated to return home and has good family support.   In this PT evaluation, the patient pre

## 2020-05-11 ENCOUNTER — TELEPHONE (OUTPATIENT)
Dept: INTERNAL MEDICINE CLINIC | Facility: CLINIC | Age: 80
End: 2020-05-11

## 2020-05-11 DIAGNOSIS — D72.829 LEUKOCYTOSIS, UNSPECIFIED TYPE: Primary | ICD-10-CM

## 2020-05-11 NOTE — TELEPHONE ENCOUNTER
----- Message from Frances Granda MD sent at 5/10/2020 12:27 PM CDT -----  Continued elevation of WBC and anemia. Unclear etiology.  Please have pt see dr Tex Jimenez (heme) for eval if he has not seen hematology yet

## 2020-05-11 NOTE — TELEPHONE ENCOUNTER
Rita Geronimo M.D. [facility]    [address] [facility], [address]    [phone]       Spoke with patient and discussed recommendations and understanding was expressed.      Orders Placed

## 2020-05-13 ENCOUNTER — OFFICE VISIT (OUTPATIENT)
Dept: HEMATOLOGY/ONCOLOGY | Facility: HOSPITAL | Age: 80
End: 2020-05-13
Attending: INTERNAL MEDICINE
Payer: MEDICARE

## 2020-05-13 VITALS
SYSTOLIC BLOOD PRESSURE: 130 MMHG | OXYGEN SATURATION: 96 % | HEART RATE: 89 BPM | TEMPERATURE: 97 F | WEIGHT: 210.81 LBS | RESPIRATION RATE: 18 BRPM | HEIGHT: 74 IN | BODY MASS INDEX: 27.05 KG/M2 | DIASTOLIC BLOOD PRESSURE: 70 MMHG

## 2020-05-13 DIAGNOSIS — D72.829 LEUKOCYTOSIS, UNSPECIFIED TYPE: Primary | ICD-10-CM

## 2020-05-13 DIAGNOSIS — D64.9 ANEMIA, UNSPECIFIED TYPE: ICD-10-CM

## 2020-05-13 PROCEDURE — 99205 OFFICE O/P NEW HI 60 MIN: CPT | Performed by: INTERNAL MEDICINE

## 2020-05-14 NOTE — CONSULTS
Cancer Center Report of Consultation    Patient Name: Blanche Mendoza   YOB: 1940   Medical Record Number: [id number]   [facility]: [id number]   Consulting Physician: Noe Richard M.D.    Referring Physician: Greer Wong    Date of Consu History:  Past Surgical History:   Procedure Laterality Date   • CABG  1993    triple bypass   • CATARACT     • CATH PERCUTANEOUS  TRANSLUMINAL CORONARY ANGIOPLASTY      early 1990's    • CHOLECYSTECTOMY     • COLONOSCOPY  9/8/2004, 2011    x2   • OPEN HEA Talks on phone: Not on file        Gets together: Not on file        Attends [employer]ism service: Not on file        Active member of club or organization: Not on file        Attends meetings of clubs or organizations: Not on file        Relationship st Allergies:  No Known Allergies     Review of Systems:    Constitutional Normal - No fevers, chills, night sweats, excessive fatigue or weight loss. Eyes Normal - No significant visual difficulties. No diplopia. No yellowing of the eyes.    Hematologic Lungs are clear to auscultation without rhonchi or wheezing. Cardiovascular Normal - Regular rate and rhythm of heart without murmurs, gallops or rubs. Abdomen Normal - Non-tender, non-distended, no masses, ascites or hepatosplenomegaly.  Good bowel angel 2. 62 3.45 2.94   Monocytes Absolute Latest Ref Range: 0.10 - 1.00 x10(3) uL 1.28 (H) 0.79 0.99 1.03 (H) 0.92   Eosinophils Absolute Latest Ref Range: 0.00 - 0.70 x10(3) uL 0.17 0.08 0.20 0.20 0.14   Basophils Absolute Latest Ref Range: 0.00 - 0.20 x10(3) u continued monitoring. If this worsens, further evaluation will be indicated. Planned Follow Up:  As needed. I spent 60 minutes face to face with the patient.   More than 50% of that time was spent counseling the patient and/or on coordination of ca

## 2020-06-03 ENCOUNTER — OFFICE VISIT (OUTPATIENT)
Dept: NEPHROLOGY | Facility: CLINIC | Age: 80
End: 2020-06-03
Payer: MEDICARE

## 2020-06-03 VITALS — WEIGHT: 208.81 LBS | SYSTOLIC BLOOD PRESSURE: 110 MMHG | BODY MASS INDEX: 27 KG/M2 | DIASTOLIC BLOOD PRESSURE: 52 MMHG

## 2020-06-03 DIAGNOSIS — N18.30 STAGE 3 CHRONIC KIDNEY DISEASE (HCC): Primary | ICD-10-CM

## 2020-06-03 PROCEDURE — 99213 OFFICE O/P EST LOW 20 MIN: CPT | Performed by: INTERNAL MEDICINE

## 2020-06-03 NOTE — PROGRESS NOTES
Nephrology Progress Note      Camilla Gutierrez is a 78year old male.     HPI:   Patient presents with:  Chronic Kidney Disease  Hypertension  Other: Proteinuria      79 y/o male with hx of CAD, GERD, DVT, HTN, hx of Renal cell cancer s/p partial L nephr Unspecified essential hypertension    • Visual impairment     glasses      Past Surgical History:   Procedure Laterality Date   • CABG  1993    triple bypass   • CATARACT     • CATH PERCUTANEOUS  TRANSLUMINAL CORONARY ANGIOPLASTY      early 1990's    • CHO pain. 25 tablet 3   • Cholecalciferol (VITAMIN D3) 2000 UNITS Oral Tab Take 1 tablet by mouth daily. • aspirin 81 MG Oral Tab Take 81 mg by mouth daily.          Allergies:  No Known Allergies      ROS:   See above; 12 systems reviewed and otherwise u CALCULATED OSMOLALITY Latest Ref Range: 275 - 295 mOsm/kg 296 (H)     ALKALINE PHOSPHATASE Latest Ref Range: 45 - 117 U/L 65     AST (SGOT) Latest Ref Range: 15 - 37 U/L 21     ALT (SGPT) Latest Ref Range: 16 - 61 U/L 25     Total Bilirubin Latest Ref Ra Granulocyte Absolute Latest Ref Range: 0.00 - 1.00 x10(3) uL 0.05 0.07    Neutrophils % Latest Units: % 57.1 63.7    Lymphocytes % Latest Units: % 30.7 25.8    Monocytes % Latest Units: % 9.2 8.1    Eosinophils % Latest Units: % 1.8 1.2    Basophils % Late BPH/hx of renal cell Ca s/p partial nephrectomy; Hematuria -  Follows w/ Dr. Godfrey Walker in urology    3. HTN - as above; controlled    4.  CAD; s/p CABG - appears well; follows w/ Dr. Phill Ding    F/U in 1 yr    Ning Martini MD  6/3/2020

## 2020-06-29 DIAGNOSIS — Z85.828 PERSONAL HISTORY OF OTHER MALIGNANT NEOPLASM OF SKIN: ICD-10-CM

## 2020-06-29 DIAGNOSIS — I48.92 ATRIAL FLUTTER, PAROXYSMAL (HCC): ICD-10-CM

## 2020-06-29 DIAGNOSIS — E78.2 MIXED HYPERLIPIDEMIA: ICD-10-CM

## 2020-06-29 DIAGNOSIS — I10 ESSENTIAL HYPERTENSION, BENIGN: ICD-10-CM

## 2020-06-29 RX ORDER — ATORVASTATIN CALCIUM 10 MG/1
TABLET, FILM COATED ORAL
Qty: 90 TABLET | Refills: 1 | Status: SHIPPED | OUTPATIENT
Start: 2020-06-29 | End: 2020-12-07

## 2020-06-30 RX ORDER — METOPROLOL SUCCINATE 50 MG/1
50 TABLET, EXTENDED RELEASE ORAL
Qty: 180 TABLET | Refills: 1 | OUTPATIENT
Start: 2020-06-30

## 2020-07-08 DIAGNOSIS — I10 ESSENTIAL HYPERTENSION: Primary | ICD-10-CM

## 2020-07-08 RX ORDER — METOPROLOL SUCCINATE 50 MG/1
50 TABLET, EXTENDED RELEASE ORAL
Qty: 180 TABLET | Refills: 1 | Status: SHIPPED | OUTPATIENT
Start: 2020-07-08 | End: 2020-09-08

## 2020-07-30 DIAGNOSIS — I10 ESSENTIAL HYPERTENSION: ICD-10-CM

## 2020-07-30 RX ORDER — HYDRALAZINE HYDROCHLORIDE 100 MG/1
100 TABLET, FILM COATED ORAL 3 TIMES DAILY
Qty: 270 TABLET | Refills: 1 | Status: SHIPPED | OUTPATIENT
Start: 2020-07-30 | End: 2020-11-19

## 2020-09-08 DIAGNOSIS — I10 ESSENTIAL HYPERTENSION: ICD-10-CM

## 2020-09-08 RX ORDER — LISINOPRIL 20 MG/1
TABLET ORAL
Qty: 90 TABLET | Refills: 1 | Status: SHIPPED | OUTPATIENT
Start: 2020-09-08 | End: 2020-11-19

## 2020-09-08 RX ORDER — METOPROLOL SUCCINATE 50 MG/1
TABLET, EXTENDED RELEASE ORAL
Qty: 90 TABLET | Refills: 1 | Status: SHIPPED | OUTPATIENT
Start: 2020-09-08 | End: 2020-09-30

## 2020-09-30 DIAGNOSIS — I10 ESSENTIAL HYPERTENSION: ICD-10-CM

## 2020-09-30 RX ORDER — METOPROLOL SUCCINATE 50 MG/1
50 TABLET, EXTENDED RELEASE ORAL 2 TIMES DAILY
Qty: 180 TABLET | Refills: 0 | Status: SHIPPED | OUTPATIENT
Start: 2020-09-30 | End: 2020-12-07

## 2020-09-30 NOTE — TELEPHONE ENCOUNTER
Pt contacted and informed metoprolol 50 mg is to be used as BID not 0.5 tab BID, medication was placed in error. Pt reports no change in Bp and no communication of clinical note indicating need to change dose.

## 2020-09-30 NOTE — TELEPHONE ENCOUNTER
Patient wants to clarify metoprolol dose - he says he has been taking 1 tab 2x per day, but just now he picked up a refill that stated to take . 5 tab 2x per day. He says he does not remember Shelby Mcdaniel telling him to reduce the dose.  Please c/b on home # to cla

## 2020-10-01 ENCOUNTER — NURSE ONLY (OUTPATIENT)
Dept: INTERNAL MEDICINE CLINIC | Facility: CLINIC | Age: 80
End: 2020-10-01
Payer: MEDICARE

## 2020-10-01 DIAGNOSIS — Z23 NEED FOR INFLUENZA VACCINATION: Primary | ICD-10-CM

## 2020-10-01 PROCEDURE — G0008 ADMIN INFLUENZA VIRUS VAC: HCPCS | Performed by: INTERNAL MEDICINE

## 2020-10-01 PROCEDURE — 90662 IIV NO PRSV INCREASED AG IM: CPT | Performed by: INTERNAL MEDICINE

## 2020-11-10 ENCOUNTER — OFFICE VISIT (OUTPATIENT)
Dept: INTERNAL MEDICINE CLINIC | Facility: CLINIC | Age: 80
End: 2020-11-10
Payer: MEDICARE

## 2020-11-10 VITALS
BODY MASS INDEX: 28.58 KG/M2 | WEIGHT: 211 LBS | HEART RATE: 72 BPM | TEMPERATURE: 97 F | DIASTOLIC BLOOD PRESSURE: 72 MMHG | OXYGEN SATURATION: 97 % | HEIGHT: 72 IN | RESPIRATION RATE: 16 BRPM | SYSTOLIC BLOOD PRESSURE: 130 MMHG

## 2020-11-10 DIAGNOSIS — E78.2 MIXED HYPERLIPIDEMIA: ICD-10-CM

## 2020-11-10 DIAGNOSIS — I48.92 ATRIAL FLUTTER, PAROXYSMAL (HCC): ICD-10-CM

## 2020-11-10 DIAGNOSIS — I10 ESSENTIAL HYPERTENSION: ICD-10-CM

## 2020-11-10 DIAGNOSIS — R91.8 LUNG NODULES: ICD-10-CM

## 2020-11-10 DIAGNOSIS — I25.10 CORONARY ARTERY DISEASE INVOLVING NATIVE CORONARY ARTERY OF NATIVE HEART WITHOUT ANGINA PECTORIS: ICD-10-CM

## 2020-11-10 DIAGNOSIS — I71.4 ABDOMINAL AORTIC ANEURYSM (AAA) WITHOUT RUPTURE (HCC): ICD-10-CM

## 2020-11-10 DIAGNOSIS — N40.1 BPH WITH OBSTRUCTION/LOWER URINARY TRACT SYMPTOMS: ICD-10-CM

## 2020-11-10 DIAGNOSIS — Z85.528 HISTORY OF RENAL CELL CARCINOMA: ICD-10-CM

## 2020-11-10 DIAGNOSIS — I50.32 CHRONIC DIASTOLIC HEART FAILURE (HCC): ICD-10-CM

## 2020-11-10 DIAGNOSIS — Z00.00 ANNUAL PHYSICAL EXAM: Primary | ICD-10-CM

## 2020-11-10 DIAGNOSIS — I35.8 AORTIC VALVE SCLEROSIS: ICD-10-CM

## 2020-11-10 DIAGNOSIS — N13.8 BPH WITH OBSTRUCTION/LOWER URINARY TRACT SYMPTOMS: ICD-10-CM

## 2020-11-10 DIAGNOSIS — N04.9 NEPHROTIC SYNDROME: ICD-10-CM

## 2020-11-10 DIAGNOSIS — Z00.00 ENCOUNTER FOR ANNUAL HEALTH EXAMINATION: ICD-10-CM

## 2020-11-10 DIAGNOSIS — N05.0 MINIMAL CHANGE GLOMERULAR DISEASE: ICD-10-CM

## 2020-11-10 PROCEDURE — G0439 PPPS, SUBSEQ VISIT: HCPCS | Performed by: INTERNAL MEDICINE

## 2020-11-10 NOTE — PATIENT INSTRUCTIONS
Tj Kemp's SCREENING SCHEDULE   Tests on this list are recommended by your physician but may not be covered, or covered at this frequency, by your insurer. Please check with your insurance carrier before scheduling to verify coverage.     [employer] to patients who meet one of the following criteria:   • Men who are 73-68 years old and have smoked more than 100 cigarettes in their lifetime   • Anyone with a family history    Colorectal Cancer Screening Covered up to Age 76     Colonoscopy Scre VIRUS VACCINE, PRESERV FREE, >=1YEARS OF AGE   • ADMIN INFLUENZA VIRUS VAC    Please get every year    Pneumococcal 13 (Prevnar)  Covered Once after 65 Orders placed or performed in visit on 10/06/15   • PNEUMOCOCCAL VACC, 13 KAITLYNN IM    Please get once aft

## 2020-11-10 NOTE — PROGRESS NOTES
HPI:   Serge Gonzalez is a 78year old male who presents for a Medicare Subsequent Annual Wellness visit (Pt already had Initial Annual Wellness). HPI:  Here for AWV  No complaints.   Normal state of health  Denies cp or sob    PAST MEDICAL, SOCIAL, Team:  Maryse Eagle MD as PCP - General (Internal Medicine)  Maryse Eagle MD as PCP - Zoila Duff MD (Jose Fast)  Reba Oquendo MD (Cardiovascular Diseases)  Yen Danielle MD (DERMATOLOGY)  Tere Franco MD (NEPHROLOGY)    Patient Active Probl WITH FOOD    •  nitroGLYCERIN 0.4 MG Sublingual SL Tab, Place 1 tablet (0.4 mg total) under the tongue every 5 (five) minutes as needed for Chest pain. •  Cholecalciferol (VITAMIN D3) 2000 UNITS Oral Tab, Take 1 tablet by mouth daily.       •  aspirin 81 denies chest pain on exertion  GI: denies abdominal pain, denies heartburn  : 1 per night nocturia, no complaint of urinary incontinence  MUSCULOSKELETAL: denies back pain  NEURO: denies headaches  PSYCHE: denies depression or anxiety  HEMATOLOGIC: denie lesions   Lymph nodes: Cervical, supraclavicular nodes normal   Neurologic: Nonfocal            Vaccination History     Immunization History   Administered Date(s) Administered   • FLU VAC High Dose 72 YRS & Older PRSV Free (30674) 10/17/2014, 11/05/2019, Nephrotic syndrome- stable. defer to renal service     Abdominal aortic aneurysm (AAA) without rupture ([facility] [address]- monitored by cardiology.  Cont HTN control     Minimal change glomerular disease- stable labs and monitored by renal service     Chronic diast Occult Blood Annually No results found for: FOB No flowsheet data found. Glaucoma Screening      Ophthalmology Visit Annually: Diabetics, FHx Glaucoma, AA>50, > 65 No flowsheet data found.     Prostate Cancer Screening      PSA  Annually There ar Value   05/06/2013 0.1 (L)    No flowsheet data found.

## 2020-11-12 ENCOUNTER — LAB ENCOUNTER (OUTPATIENT)
Dept: LAB | Age: 80
End: 2020-11-12
Attending: INTERNAL MEDICINE
Payer: MEDICARE

## 2020-11-12 ENCOUNTER — TELEPHONE (OUTPATIENT)
Dept: INTERNAL MEDICINE CLINIC | Facility: CLINIC | Age: 80
End: 2020-11-12

## 2020-11-12 DIAGNOSIS — E78.2 MIXED HYPERLIPIDEMIA: ICD-10-CM

## 2020-11-12 DIAGNOSIS — E87.5 HYPERKALEMIA: Primary | ICD-10-CM

## 2020-11-12 DIAGNOSIS — I10 ESSENTIAL HYPERTENSION: ICD-10-CM

## 2020-11-12 PROCEDURE — 80053 COMPREHEN METABOLIC PANEL: CPT

## 2020-11-12 PROCEDURE — 85025 COMPLETE CBC W/AUTO DIFF WBC: CPT

## 2020-11-12 PROCEDURE — 80061 LIPID PANEL: CPT

## 2020-11-12 PROCEDURE — 81001 URINALYSIS AUTO W/SCOPE: CPT

## 2020-11-12 PROCEDURE — 36415 COLL VENOUS BLD VENIPUNCTURE: CPT

## 2020-11-12 PROCEDURE — 84443 ASSAY THYROID STIM HORMONE: CPT

## 2020-11-12 NOTE — TELEPHONE ENCOUNTER
----- Message from Shanelle London MD sent at 11/12/2020  3:49 PM CST -----  No dm2  Potassium is elevated.  Recheck as I suspect error  Stable renal functions  Normal LFT  flp is at goal. Cont statin  Thyroid functions are normal

## 2020-11-12 NOTE — TELEPHONE ENCOUNTER
Patient expresses understanding of lab results and processes going forward.    Order placed for BMP to check potassium

## 2020-11-13 ENCOUNTER — TELEPHONE (OUTPATIENT)
Dept: INTERNAL MEDICINE CLINIC | Facility: CLINIC | Age: 80
End: 2020-11-13

## 2020-11-13 DIAGNOSIS — E87.5 SERUM POTASSIUM ELEVATED: Primary | ICD-10-CM

## 2020-11-13 NOTE — TELEPHONE ENCOUNTER
----- Message from Claribel Walker MD sent at 11/12/2020  3:49 PM CST -----  No dm2  Potassium is elevated.  Recheck as I suspect error  Stable renal functions  Normal LFT  flp is at goal. Cont statin  Thyroid functions are normal

## 2020-11-13 NOTE — TELEPHONE ENCOUNTER
Spoke with patient and results and discussed recommendations and understanding was expressed.      Order previously placed by Bret Kennedy RN

## 2020-11-17 ENCOUNTER — LABORATORY ENCOUNTER (OUTPATIENT)
Dept: LAB | Age: 80
End: 2020-11-17
Attending: INTERNAL MEDICINE
Payer: MEDICARE

## 2020-11-17 DIAGNOSIS — E87.5 HYPERKALEMIA: ICD-10-CM

## 2020-11-17 PROCEDURE — 36415 COLL VENOUS BLD VENIPUNCTURE: CPT

## 2020-11-17 PROCEDURE — 80048 BASIC METABOLIC PNL TOTAL CA: CPT

## 2020-11-18 ENCOUNTER — TELEPHONE (OUTPATIENT)
Dept: NEPHROLOGY | Facility: CLINIC | Age: 80
End: 2020-11-18

## 2020-11-18 NOTE — TELEPHONE ENCOUNTER
Dr. Barrno Painter told pt to call you about his potassium of 5.2. Should he be concerned about this? Is it ok? Do you want to rx something? Pls call pt. [phone].

## 2020-12-07 DIAGNOSIS — I10 ESSENTIAL HYPERTENSION, BENIGN: ICD-10-CM

## 2020-12-07 DIAGNOSIS — Z85.828 PERSONAL HISTORY OF OTHER MALIGNANT NEOPLASM OF SKIN: ICD-10-CM

## 2020-12-07 DIAGNOSIS — I10 ESSENTIAL HYPERTENSION: ICD-10-CM

## 2020-12-07 DIAGNOSIS — E78.2 MIXED HYPERLIPIDEMIA: ICD-10-CM

## 2020-12-07 DIAGNOSIS — I48.92 ATRIAL FLUTTER, PAROXYSMAL (HCC): ICD-10-CM

## 2020-12-07 RX ORDER — ATORVASTATIN CALCIUM 10 MG/1
TABLET, FILM COATED ORAL
Qty: 90 TABLET | Refills: 1 | Status: SHIPPED | OUTPATIENT
Start: 2020-12-07 | End: 2021-04-21

## 2020-12-07 RX ORDER — METOPROLOL SUCCINATE 50 MG/1
TABLET, EXTENDED RELEASE ORAL
Qty: 180 TABLET | Refills: 1 | Status: SHIPPED | OUTPATIENT
Start: 2020-12-07 | End: 2021-07-22

## 2021-01-28 DIAGNOSIS — I10 ESSENTIAL HYPERTENSION: ICD-10-CM

## 2021-01-28 RX ORDER — HYDRALAZINE HYDROCHLORIDE 100 MG/1
100 TABLET, FILM COATED ORAL 2 TIMES DAILY
Qty: 180 TABLET | Refills: 1 | Status: SHIPPED | OUTPATIENT
Start: 2021-01-28 | End: 2021-07-22

## 2021-03-05 DIAGNOSIS — I10 ESSENTIAL HYPERTENSION: ICD-10-CM

## 2021-03-05 DIAGNOSIS — Z23 NEED FOR VACCINATION: ICD-10-CM

## 2021-03-05 RX ORDER — LISINOPRIL 20 MG/1
TABLET ORAL
Qty: 90 TABLET | Refills: 0 | Status: SHIPPED | OUTPATIENT
Start: 2021-03-05 | End: 2021-05-27

## 2021-04-16 ENCOUNTER — TELEPHONE (OUTPATIENT)
Dept: INTERNAL MEDICINE CLINIC | Facility: CLINIC | Age: 81
End: 2021-04-16

## 2021-04-16 DIAGNOSIS — E78.2 MIXED HYPERLIPIDEMIA: ICD-10-CM

## 2021-04-16 DIAGNOSIS — I10 ESSENTIAL HYPERTENSION: Primary | ICD-10-CM

## 2021-04-16 NOTE — TELEPHONE ENCOUNTER
Patient calling in to find out if Dr. Anna Valentin wants him to get blood work done before the appointment.  Please advise

## 2021-04-21 DIAGNOSIS — I48.92 ATRIAL FLUTTER, PAROXYSMAL (HCC): ICD-10-CM

## 2021-04-21 DIAGNOSIS — I10 ESSENTIAL HYPERTENSION, BENIGN: ICD-10-CM

## 2021-04-21 DIAGNOSIS — E78.2 MIXED HYPERLIPIDEMIA: ICD-10-CM

## 2021-04-21 DIAGNOSIS — Z85.828 PERSONAL HISTORY OF OTHER MALIGNANT NEOPLASM OF SKIN: ICD-10-CM

## 2021-04-21 RX ORDER — ATORVASTATIN CALCIUM 10 MG/1
TABLET, FILM COATED ORAL
Qty: 90 TABLET | Refills: 1 | Status: SHIPPED | OUTPATIENT
Start: 2021-04-21 | End: 2021-10-22

## 2021-04-23 ENCOUNTER — OFFICE VISIT (OUTPATIENT)
Dept: ORTHOPEDICS CLINIC | Facility: CLINIC | Age: 81
End: 2021-04-23
Payer: MEDICARE

## 2021-04-23 DIAGNOSIS — L97.521 ULCER OF LEFT FOOT, LIMITED TO BREAKDOWN OF SKIN (HCC): Primary | ICD-10-CM

## 2021-04-23 DIAGNOSIS — G62.9 NEUROPATHY: ICD-10-CM

## 2021-04-23 PROCEDURE — 99202 OFFICE O/P NEW SF 15 MIN: CPT | Performed by: PODIATRIST

## 2021-04-23 RX ORDER — COVID-19 ANTIGEN TEST
220 KIT MISCELLANEOUS
COMMUNITY
End: 2022-01-19

## 2021-04-23 RX ORDER — MULTIVIT-MIN/FOLIC ACID/LUTEIN 400-250MCG
1 TABLET,CHEWABLE ORAL DAILY
COMMUNITY

## 2021-04-23 RX ORDER — FAMOTIDINE 10 MG
10 TABLET ORAL 2 TIMES DAILY
COMMUNITY

## 2021-04-23 NOTE — PROGRESS NOTES
[facility] New Patient Note    CC: Patient presents with:   Foot Pain: Patient is here today for left foot sore that has been taking a little over a year to heal.       HPI: The patient is a 78year old male who presents today with complaints of HAMMER TOE     • TONSILLECTOMY       Current Outpatient Medications   Medication Sig Dispense Refill   • Naproxen Sodium (ALEVE) 220 MG Oral Cap Take 220 mg by mouth. • multiple vitamin Oral Chew Tab Chew 1 tablet by mouth daily.      • famotidine 10 MG vitals taken for this visit. Pulses faint but palpable. Sensation sharp versus dull is limited in the forefoot area especially plantar metatarsal heads.   Very thin fat pad and prominent plantarflexed metatarsals bilateral feet    Subthird MP joint left

## 2021-04-24 ENCOUNTER — APPOINTMENT (OUTPATIENT)
Dept: CT IMAGING | Facility: HOSPITAL | Age: 81
End: 2021-04-24
Attending: EMERGENCY MEDICINE
Payer: MEDICARE

## 2021-04-24 ENCOUNTER — HOSPITAL ENCOUNTER (EMERGENCY)
Facility: HOSPITAL | Age: 81
Discharge: HOME | End: 2021-04-24
Attending: EMERGENCY MEDICINE
Payer: MEDICARE

## 2021-04-24 ENCOUNTER — APPOINTMENT (OUTPATIENT)
Dept: GENERAL RADIOLOGY | Facility: HOSPITAL | Age: 81
End: 2021-04-24
Attending: EMERGENCY MEDICINE
Payer: MEDICARE

## 2021-04-24 VITALS
TEMPERATURE: 97 F | WEIGHT: 210 LBS | HEART RATE: 73 BPM | OXYGEN SATURATION: 99 % | DIASTOLIC BLOOD PRESSURE: 56 MMHG | HEIGHT: 74 IN | RESPIRATION RATE: 17 BRPM | SYSTOLIC BLOOD PRESSURE: 138 MMHG | BODY MASS INDEX: 26.95 KG/M2
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
## 2021-04-24 DIAGNOSIS — R55 SYNCOPE, NEAR: ICD-10-CM

## 2021-04-24 PROCEDURE — 70450 CT HEAD/BRAIN W/O DYE: CPT | Performed by: EMERGENCY MEDICINE

## 2021-04-24 PROCEDURE — 82077 ASSAY SPEC XCP UR&BREATH IA: CPT | Performed by: EMERGENCY MEDICINE

## 2021-04-24 PROCEDURE — 80048 BASIC METABOLIC PNL TOTAL CA: CPT | Performed by: EMERGENCY MEDICINE

## 2021-04-24 PROCEDURE — 99284 EMERGENCY DEPT VISIT MOD MDM: CPT

## 2021-04-24 PROCEDURE — 71045 X-RAY EXAM CHEST 1 VIEW: CPT | Performed by: EMERGENCY MEDICINE

## 2021-04-24 PROCEDURE — 85025 COMPLETE CBC W/AUTO DIFF WBC: CPT | Performed by: EMERGENCY MEDICINE

## 2021-04-24 PROCEDURE — 36415 COLL VENOUS BLD VENIPUNCTURE: CPT

## 2021-04-24 PROCEDURE — 93005 ELECTROCARDIOGRAM TRACING: CPT

## 2021-04-24 PROCEDURE — 93010 ELECTROCARDIOGRAM REPORT: CPT

## 2021-04-24 PROCEDURE — 84484 ASSAY OF TROPONIN QUANT: CPT | Performed by: EMERGENCY MEDICINE

## 2021-04-25 NOTE — ED INITIAL ASSESSMENT (HPI)
Pt arrives via EMS after falling down 7 steps. Per EMS when they arrived pt was incoherent, pale, ashy, BP 75/45 and pulse of 40. 400cc bolus given and pt started to perk up. Pt states having a drink tonight. Pt unsure of hitting his head.  Pt states having

## 2021-04-25 NOTE — ED PROVIDER NOTES
Patient Seen in: [facility] Emergency Department      History   Patient presents with:  Fall    Stated Complaint: fall    HPI/Subjective:   HPI    Patient remembers a purely mechanical fall. He slipped carpeted stairs.   He remembers falling onto hi well-nourished, comfortable, no acute distress, talking easily, joking and laughing with staff  Head and neck: Normocephalic, atraumatic  Cardiovascular: Regular rate and rhythm, normal S1 and S2, no obvious murmurs, rubs, or gallops   Lungs: Clear to ausc EKG    Rate, intervals and axes as noted on EKG Report. Rate: 65  Rhythm: Sinus Rhythm  Reading: PVCs otherwise normal              Chest x-ray minimal atelectasis  CT brain         MDM      Patient had a mechanical fall down 6 carpeted steps tonight.

## 2021-05-06 ENCOUNTER — LAB ENCOUNTER (OUTPATIENT)
Dept: LAB | Age: 81
End: 2021-05-06
Attending: INTERNAL MEDICINE
Payer: MEDICARE

## 2021-05-06 DIAGNOSIS — I10 ESSENTIAL HYPERTENSION: ICD-10-CM

## 2021-05-06 DIAGNOSIS — E78.2 MIXED HYPERLIPIDEMIA: ICD-10-CM

## 2021-05-06 PROCEDURE — 81003 URINALYSIS AUTO W/O SCOPE: CPT

## 2021-05-06 PROCEDURE — 36415 COLL VENOUS BLD VENIPUNCTURE: CPT

## 2021-05-06 PROCEDURE — 80053 COMPREHEN METABOLIC PANEL: CPT

## 2021-05-06 PROCEDURE — 80061 LIPID PANEL: CPT

## 2021-05-11 ENCOUNTER — OFFICE VISIT (OUTPATIENT)
Dept: INTERNAL MEDICINE CLINIC | Facility: CLINIC | Age: 81
End: 2021-05-11
Payer: MEDICARE

## 2021-05-11 VITALS
RESPIRATION RATE: 16 BRPM | WEIGHT: 213 LBS | TEMPERATURE: 98 F | HEIGHT: 74 IN | HEART RATE: 68 BPM | BODY MASS INDEX: 27.34 KG/M2 | DIASTOLIC BLOOD PRESSURE: 70 MMHG | OXYGEN SATURATION: 97 % | SYSTOLIC BLOOD PRESSURE: 120 MMHG

## 2021-05-11 DIAGNOSIS — I10 ESSENTIAL HYPERTENSION: Primary | ICD-10-CM

## 2021-05-11 PROCEDURE — 99213 OFFICE O/P EST LOW 20 MIN: CPT | Performed by: INTERNAL MEDICINE

## 2021-05-11 NOTE — PROGRESS NOTES
HPI/Subjective:   Patient ID: Serge Gonzalez is a 78year old male. Hypertension      Serge Gonzalez is a 78year old male. HPI:   Patient presents for recheck of his hypertension.  Pt has been taking medications as instructed, no medicatio FOOD 90 tablet 0   • hydrALAZINE HCl 100 MG Oral Tab Take 1 tablet (100 mg total) by mouth 2 (two) times daily.  180 tablet 1   • METOPROLOL SUCCINATE ER 50 MG Oral Tablet 24 Hr TAKE 1 TABLET BY MOUTH TWICE A  tablet 1   • Cholecalciferol (VITAMIN D3 Social History    Tobacco Use      Smoking status: Former Smoker        Quit date: 1976        Years since quittin.8      Smokeless tobacco: Never Used    Vaping Use      Vaping Use: Never used    Alcohol use:  Yes      Alcohol/week: 0.0 standard 1 TABLET BY MOUTH EVERY DAY WITH FOOD 90 tablet 0   • hydrALAZINE HCl 100 MG Oral Tab Take 1 tablet (100 mg total) by mouth 2 (two) times daily.  180 tablet 1   • METOPROLOL SUCCINATE ER 50 MG Oral Tablet 24 Hr TAKE 1 TABLET BY MOUTH TWICE A  tablet

## 2021-05-27 ENCOUNTER — LAB ENCOUNTER (OUTPATIENT)
Dept: LAB | Age: 81
End: 2021-05-27
Attending: INTERNAL MEDICINE
Payer: MEDICARE

## 2021-05-27 DIAGNOSIS — N18.30 STAGE 3 CHRONIC KIDNEY DISEASE (HCC): ICD-10-CM

## 2021-05-27 DIAGNOSIS — I10 ESSENTIAL HYPERTENSION: ICD-10-CM

## 2021-05-27 PROCEDURE — 85025 COMPLETE CBC W/AUTO DIFF WBC: CPT

## 2021-05-27 PROCEDURE — 83735 ASSAY OF MAGNESIUM: CPT

## 2021-05-27 PROCEDURE — 80048 BASIC METABOLIC PNL TOTAL CA: CPT

## 2021-05-27 PROCEDURE — 84100 ASSAY OF PHOSPHORUS: CPT

## 2021-05-27 PROCEDURE — 82306 VITAMIN D 25 HYDROXY: CPT

## 2021-05-27 PROCEDURE — 84156 ASSAY OF PROTEIN URINE: CPT

## 2021-05-27 PROCEDURE — 81003 URINALYSIS AUTO W/O SCOPE: CPT

## 2021-05-27 PROCEDURE — 82570 ASSAY OF URINE CREATININE: CPT

## 2021-05-27 PROCEDURE — 36415 COLL VENOUS BLD VENIPUNCTURE: CPT

## 2021-05-27 RX ORDER — LISINOPRIL 20 MG/1
TABLET ORAL
Qty: 90 TABLET | Refills: 1 | Status: SHIPPED | OUTPATIENT
Start: 2021-05-27 | End: 2021-10-22

## 2021-06-02 ENCOUNTER — OFFICE VISIT (OUTPATIENT)
Dept: NEPHROLOGY | Facility: CLINIC | Age: 81
End: 2021-06-02
Payer: MEDICARE

## 2021-06-02 VITALS — SYSTOLIC BLOOD PRESSURE: 102 MMHG | DIASTOLIC BLOOD PRESSURE: 60 MMHG | BODY MASS INDEX: 27 KG/M2 | WEIGHT: 214 LBS

## 2021-06-02 DIAGNOSIS — N18.30 STAGE 3 CHRONIC KIDNEY DISEASE, UNSPECIFIED WHETHER STAGE 3A OR 3B CKD (HCC): Primary | ICD-10-CM

## 2021-06-02 PROCEDURE — 99213 OFFICE O/P EST LOW 20 MIN: CPT | Performed by: INTERNAL MEDICINE

## 2021-06-02 NOTE — PROGRESS NOTES
Nephrology Progress Note      Nnamdi Guzmán is a 78year old male.     HPI:   Patient presents with:  Chronic Kidney Disease      77 y/o male with hx of CAD, GERD, DVT, HTN, hx of Renal cell cancer s/p partial L nephrectomy, proteinuria w/ biopsy prov Procedure Laterality Date   • CABG  1993    triple bypass   • CATARACT     • CATH PERCUTANEOUS  TRANSLUMINAL CORONARY ANGIOPLASTY      early 1990's    • CHOLECYSTECTOMY     • COLONOSCOPY  9/8/2004, 2011    x2   • OPEN HEART SURGERY (PBP)     • SKIN SURGE reviewed and otherwise unremarkable        PHYSICAL EXAM:   There were no vitals taken for this visit.   Wt Readings from Last 6 Encounters:  05/12/21 : 213 lb (96.6 kg)  05/11/21 : 213 lb (96.6 kg)  04/24/21 : 210 lb (95.3 kg)  11/19/20 : 211 lb (95.7 kg) 39.0 - 53.0 % 42.2   Platelet Count Latest Ref Range: 150.0 - 450.0 10(3)uL 226.0   RBC Latest Ref Range: 3.80 - 5.80 x10(6)uL 4.65   MCH Latest Ref Range: 26.0 - 34.0 pg 29.2   MCHC Latest Ref Range: 31.0 - 37.0 g/dL 32.2   MCV Latest Ref Range: 80.0 - 10 proteinuria. Spot ratio as noted above is ~ 6g/g in Sept 2018.  Biopsy showed evidence of CRISTOPHER as noted above + evidence of severe arteriosclerosis/interstial fibrosis c/w with chronic disease  He was started on lisinopril; subsequent check shows improvemen

## 2021-06-08 ENCOUNTER — OFFICE VISIT (OUTPATIENT)
Dept: ORTHOPEDICS CLINIC | Facility: CLINIC | Age: 81
End: 2021-06-08
Payer: MEDICARE

## 2021-06-08 VITALS — WEIGHT: 212 LBS | BODY MASS INDEX: 27.21 KG/M2 | HEIGHT: 74 IN

## 2021-06-08 DIAGNOSIS — G62.9 NEUROPATHY: ICD-10-CM

## 2021-06-08 DIAGNOSIS — L97.521 ULCER OF LEFT FOOT, LIMITED TO BREAKDOWN OF SKIN (HCC): Primary | ICD-10-CM

## 2021-06-08 PROCEDURE — 11042 DBRDMT SUBQ TIS 1ST 20SQCM/<: CPT | Performed by: PODIATRIST

## 2021-06-08 PROCEDURE — 99213 OFFICE O/P EST LOW 20 MIN: CPT | Performed by: PODIATRIST

## 2021-06-08 NOTE — PROGRESS NOTES
[facility] Progress Note    Subjective: Pt is Here for follow-up of a preulcer left foot. History of neuropathy.   He did get new neuropathy type shoes and Plastizote insoles from [employer]    Objective:   Sub 3rd mpj plantar left foot, hpk with

## 2021-07-13 ENCOUNTER — OFFICE VISIT (OUTPATIENT)
Dept: ORTHOPEDICS CLINIC | Facility: CLINIC | Age: 81
End: 2021-07-13
Payer: MEDICARE

## 2021-07-13 VITALS — HEART RATE: 75 BPM | OXYGEN SATURATION: 98 %

## 2021-07-13 DIAGNOSIS — L97.521 ULCER OF LEFT FOOT, LIMITED TO BREAKDOWN OF SKIN (HCC): Primary | ICD-10-CM

## 2021-07-13 DIAGNOSIS — G62.9 NEUROPATHY: ICD-10-CM

## 2021-07-13 PROCEDURE — 11056 PARNG/CUTG B9 HYPRKR LES 2-4: CPT | Performed by: PODIATRIST

## 2021-07-13 PROCEDURE — 99213 OFFICE O/P EST LOW 20 MIN: CPT | Performed by: PODIATRIST

## 2021-07-13 NOTE — PROGRESS NOTES
[facility] Progress Note    Subjective: Mr Ghanshyam Saha is here for callous care and hammertoes.   preulcer plantar left foot  Been using good shoes and protective insoles    Objective:   Rigid hammertoes anusha with plantarflexed metatarsals and plantar

## 2021-07-22 DIAGNOSIS — I10 ESSENTIAL HYPERTENSION: ICD-10-CM

## 2021-07-22 RX ORDER — METOPROLOL SUCCINATE 50 MG/1
TABLET, EXTENDED RELEASE ORAL
Qty: 180 TABLET | Refills: 1 | Status: SHIPPED | OUTPATIENT
Start: 2021-07-22 | End: 2021-11-16

## 2021-07-22 RX ORDER — HYDRALAZINE HYDROCHLORIDE 100 MG/1
TABLET, FILM COATED ORAL
Qty: 180 TABLET | Refills: 1 | Status: SHIPPED | OUTPATIENT
Start: 2021-07-22 | End: 2022-01-12

## 2021-10-22 DIAGNOSIS — Z85.828 PERSONAL HISTORY OF OTHER MALIGNANT NEOPLASM OF SKIN: ICD-10-CM

## 2021-10-22 DIAGNOSIS — I10 ESSENTIAL HYPERTENSION, BENIGN: ICD-10-CM

## 2021-10-22 DIAGNOSIS — E78.2 MIXED HYPERLIPIDEMIA: ICD-10-CM

## 2021-10-22 DIAGNOSIS — I48.92 ATRIAL FLUTTER, PAROXYSMAL (HCC): ICD-10-CM

## 2021-10-22 DIAGNOSIS — I10 ESSENTIAL HYPERTENSION: ICD-10-CM

## 2021-10-22 RX ORDER — LISINOPRIL 20 MG/1
TABLET ORAL
Qty: 90 TABLET | Refills: 1 | Status: SHIPPED | OUTPATIENT
Start: 2021-10-22

## 2021-10-22 RX ORDER — ATORVASTATIN CALCIUM 10 MG/1
TABLET, FILM COATED ORAL
Qty: 90 TABLET | Refills: 1 | Status: SHIPPED | OUTPATIENT
Start: 2021-10-22

## 2021-11-02 ENCOUNTER — OFFICE VISIT (OUTPATIENT)
Dept: INTERNAL MEDICINE CLINIC | Facility: CLINIC | Age: 81
End: 2021-11-02
Payer: MEDICARE

## 2021-11-02 VITALS
BODY MASS INDEX: 26.05 KG/M2 | HEIGHT: 74 IN | WEIGHT: 203 LBS | HEART RATE: 72 BPM | OXYGEN SATURATION: 97 % | SYSTOLIC BLOOD PRESSURE: 130 MMHG | DIASTOLIC BLOOD PRESSURE: 70 MMHG | TEMPERATURE: 98 F | RESPIRATION RATE: 12 BRPM

## 2021-11-02 DIAGNOSIS — E78.2 MIXED HYPERLIPIDEMIA: ICD-10-CM

## 2021-11-02 DIAGNOSIS — Z85.528 HISTORY OF RENAL CELL CARCINOMA: ICD-10-CM

## 2021-11-02 DIAGNOSIS — R91.8 LUNG NODULES: ICD-10-CM

## 2021-11-02 DIAGNOSIS — Z00.00 ANNUAL PHYSICAL EXAM: Primary | ICD-10-CM

## 2021-11-02 DIAGNOSIS — I10 ESSENTIAL HYPERTENSION: ICD-10-CM

## 2021-11-02 DIAGNOSIS — N04.9 NEPHROTIC SYNDROME: ICD-10-CM

## 2021-11-02 DIAGNOSIS — I71.4 ABDOMINAL AORTIC ANEURYSM (AAA) WITHOUT RUPTURE (HCC): ICD-10-CM

## 2021-11-02 DIAGNOSIS — I50.32 CHRONIC DIASTOLIC HEART FAILURE (HCC): ICD-10-CM

## 2021-11-02 DIAGNOSIS — N05.0 MINIMAL CHANGE GLOMERULAR DISEASE: ICD-10-CM

## 2021-11-02 DIAGNOSIS — N40.1 BPH WITH OBSTRUCTION/LOWER URINARY TRACT SYMPTOMS: ICD-10-CM

## 2021-11-02 DIAGNOSIS — Z23 NEED FOR INFLUENZA VACCINATION: ICD-10-CM

## 2021-11-02 DIAGNOSIS — Z00.00 ENCOUNTER FOR ANNUAL HEALTH EXAMINATION: ICD-10-CM

## 2021-11-02 DIAGNOSIS — I35.8 AORTIC VALVE SCLEROSIS: ICD-10-CM

## 2021-11-02 DIAGNOSIS — N13.8 BPH WITH OBSTRUCTION/LOWER URINARY TRACT SYMPTOMS: ICD-10-CM

## 2021-11-02 DIAGNOSIS — J42 CHRONIC BRONCHITIS, UNSPECIFIED CHRONIC BRONCHITIS TYPE (HCC): ICD-10-CM

## 2021-11-02 DIAGNOSIS — I25.10 CORONARY ARTERY DISEASE INVOLVING NATIVE CORONARY ARTERY OF NATIVE HEART WITHOUT ANGINA PECTORIS: ICD-10-CM

## 2021-11-02 DIAGNOSIS — I48.92 ATRIAL FLUTTER, PAROXYSMAL (HCC): ICD-10-CM

## 2021-11-02 PROCEDURE — G0439 PPPS, SUBSEQ VISIT: HCPCS | Performed by: INTERNAL MEDICINE

## 2021-11-02 PROCEDURE — G0008 ADMIN INFLUENZA VIRUS VAC: HCPCS | Performed by: INTERNAL MEDICINE

## 2021-11-02 PROCEDURE — 90662 IIV NO PRSV INCREASED AG IM: CPT | Performed by: INTERNAL MEDICINE

## 2021-11-02 NOTE — PROGRESS NOTES
HPI:   Severo Campanile is a 78year old male who presents for a Medicare Subsequent Annual Wellness visit (Pt already had Initial Annual Wellness).     HPI:  Here for AWV  Normal state of health   Denies cp or sob    PAST MEDICAL, SOCIAL, FAMILY HISTORI it on his medication list.   CAGE screening score of 0 on 11/2/2021, showing low risk of alcohol abuse.          Patient Care Team: Patient Care Team:  Eliud Valadez MD as PCP - General (Internal Medicine)  Saida Drew MD (Stephanie Lara)  Lucho Carmona MD (JASSI Arrhythmia, Atrial flutter ([facility] Bad breath, Cancer ([facility] Cataract, Cholelithiasis, Coronary atherosclerosis of unspecified type of vessel, native or graft, Difficult intubation (7/14/93), DVT (deep venous thrombosis) ([facility] (4/7/2014), Earache, Esophagea Ht 6' 2\" (1.88 m)   Wt 203 lb (92.1 kg)   SpO2 97%   BMI 26.06 kg/m²   Estimated body mass index is 26.06 kg/m² as calculated from the following:    Height as of this encounter: 6' 2\" (1.88 m). Weight as of this encounter: 203 lb (92.1 kg).     Medica 6 months and older PFS 0.5 ml (85789) 10/06/2015, 10/14/2016   • Fluzone Vaccine Medicare () 10/17/2014, 11/05/2019, 10/01/2020   • Influenza 09/23/2011, 10/05/2012, 10/11/2013   • Pneumococcal (Prevnar 13) 10/06/2015   • Pneumovax 10/06/2007   • Pneu change glomerular disease-stable. defer to renal service     Chronic diastolic heart failure (HCC)-compensated. Sees cardiology     Chronic bronchitis, unspecified chronic bronchitis type ([facility] [address]- stable.  Cont current med plan    The patient indicates unders needed at [facility], and non-screening if indicated for medical reasons 04/27/2021      Ultrasound Screening for Abdominal Aortic Aneurysm (AAA) Covered once in a lifetime for one of the following risk factors   • Men who are 73-68 years old and BUN Annually Lab Results   Component Value Date    BUN 24.0 (H) 09/17/2021       Drug Serum Conc Annually Lab Results   Component Value Date    DIG 0.1 (L) 05/06/2013                  Chronic Obstructive Pulmonary Disease (COPD)    Spirometry Annually S

## 2021-11-02 NOTE — PATIENT INSTRUCTIONS
Low-Salt Choices  Eating salt (sodium) can make your body retain too much water. Extra water makes your heart work harder. Canned, packaged, and frozen foods are easy to prepare. But they are often high in sodium.  Here are some ideas for low-salt foods y recommended by your physician but may not be covered, or covered at this frequency, by your insurer. Please check with your insurance carrier before scheduling to verify coverage.    PREVENTATIVE SERVICES FREQUENCY &  COVERAGE DETAILS LAST COMPLETION DATE Pneumococcal Each vaccine ([id number] & [id number]) covered once after 65 Prevnar 13: 10/06/2015    [id number]: 10/06/2007     No recommendations at this time    Hepatitis B One screening covered for patients with certain risk factors   -  No recommendati

## 2021-11-16 DIAGNOSIS — I10 ESSENTIAL HYPERTENSION: ICD-10-CM

## 2021-11-16 RX ORDER — METOPROLOL SUCCINATE 50 MG/1
TABLET, EXTENDED RELEASE ORAL
Qty: 180 TABLET | Refills: 1 | Status: SHIPPED | OUTPATIENT
Start: 2021-11-16

## 2022-01-12 DIAGNOSIS — I10 ESSENTIAL HYPERTENSION: ICD-10-CM

## 2022-01-12 RX ORDER — HYDRALAZINE HYDROCHLORIDE 100 MG/1
TABLET, FILM COATED ORAL
Qty: 180 TABLET | Refills: 1 | Status: SHIPPED | OUTPATIENT
Start: 2022-01-12

## 2022-01-19 ENCOUNTER — APPOINTMENT (OUTPATIENT)
Dept: GENERAL RADIOLOGY | Facility: HOSPITAL | Age: 82
End: 2022-01-19
Attending: EMERGENCY MEDICINE
Payer: MEDICARE

## 2022-01-19 ENCOUNTER — HOSPITAL ENCOUNTER (OUTPATIENT)
Facility: HOSPITAL | Age: 82
Setting detail: OBSERVATION
Discharge: HOME | End: 2022-01-20
Attending: EMERGENCY MEDICINE | Admitting: HOSPITALIST
Payer: MEDICARE

## 2022-01-19 DIAGNOSIS — R00.1 BRADYCARDIA, UNSPECIFIED: ICD-10-CM

## 2022-01-19 DIAGNOSIS — R55 SYNCOPE, NEAR: Primary | ICD-10-CM

## 2022-01-19 PROBLEM — R73.9 HYPERGLYCEMIA: Status: ACTIVE | Noted: 2022-01-19

## 2022-01-19 LAB
ALBUMIN SERPL-MCNC: 2.9 G/DL (ref 3.4–5)
ALBUMIN/GLOB SERPL: 0.9 {RATIO} (ref 1–2)
ALP LIVER SERPL-CCNC: 69 U/L
ALT SERPL-CCNC: 19 U/L
ANION GAP SERPL CALC-SCNC: 7 MMOL/L (ref 0–18)
AST SERPL-CCNC: 22 U/L (ref 15–37)
ATRIAL RATE: 75 BPM
BASOPHILS # BLD AUTO: 0.05 X10(3) UL (ref 0–0.2)
BASOPHILS NFR BLD AUTO: 0.6 %
BILIRUB SERPL-MCNC: 0.6 MG/DL (ref 0.1–2)
BUN BLD-MCNC: 21 MG/DL (ref 7–18)
CALCIUM BLD-MCNC: 8.5 MG/DL (ref 8.5–10.1)
CHLORIDE SERPL-SCNC: 111 MMOL/L (ref 98–112)
CO2 SERPL-SCNC: 22 MMOL/L (ref 21–32)
CREAT BLD-MCNC: 1.47 MG/DL
EOSINOPHIL # BLD AUTO: 0.11 X10(3) UL (ref 0–0.7)
EOSINOPHIL NFR BLD AUTO: 1.3 %
ERYTHROCYTE [DISTWIDTH] IN BLOOD BY AUTOMATED COUNT: 12.4 %
GLOBULIN PLAS-MCNC: 3.3 G/DL (ref 2.8–4.4)
GLUCOSE BLD-MCNC: 101 MG/DL (ref 70–99)
HCT VFR BLD AUTO: 40.9 %
HGB BLD-MCNC: 13.3 G/DL
IMM GRANULOCYTES # BLD AUTO: 0.04 X10(3) UL (ref 0–1)
IMM GRANULOCYTES NFR BLD: 0.5 %
LYMPHOCYTES # BLD AUTO: 2.01 X10(3) UL (ref 1–4)
LYMPHOCYTES NFR BLD AUTO: 23.7 %
MAGNESIUM SERPL-MCNC: 1.8 MG/DL (ref 1.6–2.6)
MCH RBC QN AUTO: 29.6 PG (ref 26–34)
MCHC RBC AUTO-ENTMCNC: 32.5 G/DL (ref 31–37)
MCV RBC AUTO: 91.1 FL
MONOCYTES # BLD AUTO: 0.87 X10(3) UL (ref 0.1–1)
MONOCYTES NFR BLD AUTO: 10.3 %
NEUTROPHILS # BLD AUTO: 5.4 X10 (3) UL (ref 1.5–7.7)
NEUTROPHILS # BLD AUTO: 5.4 X10(3) UL (ref 1.5–7.7)
NEUTROPHILS NFR BLD AUTO: 63.6 %
OSMOLALITY SERPL CALC.SUM OF ELEC: 293 MOSM/KG (ref 275–295)
P AXIS: 74 DEGREES
P-R INTERVAL: 194 MS
PLATELET # BLD AUTO: 256 10(3)UL (ref 150–450)
POTASSIUM SERPL-SCNC: 3.9 MMOL/L (ref 3.5–5.1)
POTASSIUM SERPL-SCNC: 3.9 MMOL/L (ref 3.5–5.1)
PROT SERPL-MCNC: 6.2 G/DL (ref 6.4–8.2)
Q-T INTERVAL: 398 MS
QRS DURATION: 98 MS
QTC CALCULATION (BEZET): 444 MS
R AXIS: 28 DEGREES
RBC # BLD AUTO: 4.49 X10(6)UL
SARS-COV-2 RNA RESP QL NAA+PROBE: NOT DETECTED
SODIUM SERPL-SCNC: 140 MMOL/L (ref 136–145)
T AXIS: 8 DEGREES
TROPONIN I HIGH SENSITIVITY: 13 NG/L
VENTRICULAR RATE: 75 BPM
WBC # BLD AUTO: 8.5 X10(3) UL (ref 4–11)

## 2022-01-19 PROCEDURE — 71045 X-RAY EXAM CHEST 1 VIEW: CPT | Performed by: EMERGENCY MEDICINE

## 2022-01-19 PROCEDURE — 99220 INITIAL OBSERVATION CARE,LEVL III: CPT | Performed by: HOSPITALIST

## 2022-01-19 RX ORDER — METOPROLOL SUCCINATE 50 MG/1
50 TABLET, EXTENDED RELEASE ORAL 2 TIMES DAILY
Status: DISCONTINUED | OUTPATIENT
Start: 2022-01-19 | End: 2022-01-20

## 2022-01-19 RX ORDER — ASPIRIN 81 MG/1
81 TABLET, CHEWABLE ORAL DAILY
Status: DISCONTINUED | OUTPATIENT
Start: 2022-01-19 | End: 2022-01-20

## 2022-01-19 RX ORDER — HYDROMORPHONE HYDROCHLORIDE 1 MG/ML
0.5 INJECTION, SOLUTION INTRAMUSCULAR; INTRAVENOUS; SUBCUTANEOUS EVERY 30 MIN PRN
Status: ACTIVE | OUTPATIENT
Start: 2022-01-19 | End: 2022-01-19

## 2022-01-19 RX ORDER — SODIUM PHOSPHATE, DIBASIC AND SODIUM PHOSPHATE, MONOBASIC 7; 19 G/133ML; G/133ML
1 ENEMA RECTAL ONCE AS NEEDED
Status: DISCONTINUED | OUTPATIENT
Start: 2022-01-19 | End: 2022-01-20

## 2022-01-19 RX ORDER — LISINOPRIL 20 MG/1
20 TABLET ORAL NIGHTLY
Status: DISCONTINUED | OUTPATIENT
Start: 2022-01-19 | End: 2022-01-20

## 2022-01-19 RX ORDER — MAGNESIUM OXIDE 400 MG (241.3 MG MAGNESIUM) TABLET
400 TABLET ONCE
Status: COMPLETED | OUTPATIENT
Start: 2022-01-19 | End: 2022-01-19

## 2022-01-19 RX ORDER — POLYETHYLENE GLYCOL 3350 17 G/17G
17 POWDER, FOR SOLUTION ORAL DAILY PRN
Status: DISCONTINUED | OUTPATIENT
Start: 2022-01-19 | End: 2022-01-20

## 2022-01-19 RX ORDER — ONDANSETRON 2 MG/ML
4 INJECTION INTRAMUSCULAR; INTRAVENOUS EVERY 4 HOURS PRN
Status: DISCONTINUED | OUTPATIENT
Start: 2022-01-19 | End: 2022-01-19 | Stop reason: SDUPTHER

## 2022-01-19 RX ORDER — ATORVASTATIN CALCIUM 10 MG/1
10 TABLET, FILM COATED ORAL NIGHTLY
Status: DISCONTINUED | OUTPATIENT
Start: 2022-01-19 | End: 2022-01-20

## 2022-01-19 RX ORDER — FAMOTIDINE 10 MG
10 TABLET ORAL NIGHTLY
Status: DISCONTINUED | OUTPATIENT
Start: 2022-01-19 | End: 2022-01-20

## 2022-01-19 RX ORDER — HYDRALAZINE HYDROCHLORIDE 50 MG/1
100 TABLET, FILM COATED ORAL 2 TIMES DAILY
Status: DISCONTINUED | OUTPATIENT
Start: 2022-01-19 | End: 2022-01-20

## 2022-01-19 RX ORDER — BISACODYL 10 MG
10 SUPPOSITORY, RECTAL RECTAL
Status: DISCONTINUED | OUTPATIENT
Start: 2022-01-19 | End: 2022-01-20

## 2022-01-19 RX ORDER — MELATONIN
3 NIGHTLY PRN
Status: DISCONTINUED | OUTPATIENT
Start: 2022-01-19 | End: 2022-01-20

## 2022-01-19 RX ORDER — METOCLOPRAMIDE HYDROCHLORIDE 5 MG/ML
5 INJECTION INTRAMUSCULAR; INTRAVENOUS EVERY 8 HOURS PRN
Status: DISCONTINUED | OUTPATIENT
Start: 2022-01-19 | End: 2022-01-20

## 2022-01-19 RX ORDER — ACETAMINOPHEN 325 MG/1
650 TABLET ORAL EVERY 6 HOURS PRN
Status: DISCONTINUED | OUTPATIENT
Start: 2022-01-19 | End: 2022-01-20

## 2022-01-19 RX ORDER — RUFINAMIDE 40 MG/ML
1 SUSPENSION ORAL DAILY
Status: DISCONTINUED | OUTPATIENT
Start: 2022-01-19 | End: 2022-01-20

## 2022-01-19 RX ORDER — ONDANSETRON 2 MG/ML
4 INJECTION INTRAMUSCULAR; INTRAVENOUS EVERY 6 HOURS PRN
Status: DISCONTINUED | OUTPATIENT
Start: 2022-01-19 | End: 2022-01-20

## 2022-01-19 RX ORDER — SENNOSIDES 8.6 MG
17.2 TABLET ORAL NIGHTLY PRN
Status: DISCONTINUED | OUTPATIENT
Start: 2022-01-19 | End: 2022-01-20

## 2022-01-19 NOTE — CONSULTS
[facility] Pharmacy Note:  Renal Dose Adjustment    Carly Cline has been prescribed famotidine (PEPCID) 10 mg orally every 12 hours. Estimated Creatinine Clearance: 44.3 mL/min (A) (based on SCr of 1.47 mg/dL (H)).     Calculated creatinine clearance is

## 2022-01-19 NOTE — H&P
[facility]  History and Physical     Carly Cline Patient Status:  Emergency    1940 MRN [id number]   Location [address] [facility] Attending Carlotta Moreno MD   Hosp Day # 0 PCP Chicho Calvin MD     Chief Complain glasses        Past Surgical History:   Past Surgical History:   Procedure Laterality Date   • CABG  1993    triple bypass   • CATARACT     • CATH PERCUTANEOUS  TRANSLUMINAL CORONARY ANGIOPLASTY      early 1990's    • CHOLECYSTECTOMY     • COLONOSCOPY  9/8 daily., Disp: , Rfl:   Cholecalciferol (VITAMIN D3) 2000 UNITS Oral Tab, Take 1 tablet by mouth daily. , Disp: , Rfl:         Review of Systems:   A comprehensive 14 point review of systems was completed.     Pertinent positives and negatives noted in the rhythm  -Continue rate control  -Continue DOAC    #Hyperlipidemia  -Statin    #CKD  -Creatinine appears to be at baseline      Quality:  · DVT Prophylaxis: DOAC  · CODE status: Full  · Sparks: No    Plan of care discussed with ED physician    Henrry Ortez,

## 2022-01-19 NOTE — ED QUICK NOTES
Orders for admission, patient is aware of plan and ready to go upstairs. Any questions, please call ED RN Pastor Medina at extension [phone] . Vaccinated?  Yes  Type of COVID test sent: Rapid  COVID Suspicion level: Low        Titratable drug(s) infusing:  Ra

## 2022-01-19 NOTE — ED INITIAL ASSESSMENT (HPI)
Pt arrived to ED via EMS with c/o syncope. Per EMS, pt was leaned over [employer] counter unresponsive, but breathing. EMS sts pt was bradycardic.  Pads were applied for possible pace, but HR returned to normal.

## 2022-01-19 NOTE — CONSULTS
[facility] Cardiology Consult  Rollen Dancer MD Nadeen Dixon Patient Status:  Emergency    1940 MRN [id number]   Location [address] [facility] Attending Omar Mcdonald MD   [facility] Day # 0 PCP Danae Johnson MD Chronic diastolic heart failure (HCC)     Chronic bronchitis, unspecified chronic bronchitis type (HCC)     Hyperglycemia     Syncope, near      Social Hx: does not smoke cigarettes or drink EtOH    Family Hx: non-contributary to HPI    ROS: 10 point organ rashes  Eyes: no visual complaints or deficits  HEENT: denies nasal congestion, sinus pain; hearing loss negative  Respiratory: denies shortness of breath, wheezing or cough   Cardiovascular:  See HPI  GI: denies nausea, vomiting,   Genital/: no dysuria,

## 2022-01-19 NOTE — ED PROVIDER NOTES
Patient Seen in: [facility] Emergency Department      History   Patient presents with:  Fall    Stated Complaint:     Subjective:   HPI    78-year-old male who presents to the emergency department after he was feeling very weak.   The patient was sta PERCUTANEOUS  TRANSLUMINAL CORONARY ANGIOPLASTY      early 1990's    • CHOLECYSTECTOMY     • COLONOSCOPY  9/8/2004, 2011    x2   • OPEN HEART SURGERY (PBP)     • SKIN SURGERY Right 8-11-14    Excision for BCC, nodular to right upper back, KS   • SKIN SURGE sounds are normoactive. No CVA tenderness. Extremities: No cyanosis, no edema or clubbing. Pulses are +2. Full range of motion is noted of the extremities without deformities. No tenderness. Neurologically intact.        ED Course     Labs Reviewed sutures.  Minimal bilateral basilar atelectasis.  Osteoarthritic changes within bilateral   shoulders.  Atheromatous calcifications of the aorta.                           Impression  CONCLUSION:  Minimal bilateral basilar atelectasis.                Dictat R73.9 1/19/2022 Yes

## 2022-01-19 NOTE — PROGRESS NOTES
01/19/22 1646 01/19/22 1647 01/19/22 1648   Vital Signs   Temp  --   --   --    Temp src  --   --   --    Pulse 83 93 94   Heart Rate Source  --   --   --    Cardiac Rhythm  --   --   --    Resp  --   --   --    Respiratory Quality  --   --   --    BP 1

## 2022-01-20 ENCOUNTER — APPOINTMENT (OUTPATIENT)
Dept: CV DIAGNOSTICS | Facility: HOSPITAL | Age: 82
End: 2022-01-20
Attending: HOSPITALIST
Payer: MEDICARE

## 2022-01-20 VITALS
TEMPERATURE: 98 F | RESPIRATION RATE: 18 BRPM | BODY MASS INDEX: 25 KG/M2 | DIASTOLIC BLOOD PRESSURE: 62 MMHG | SYSTOLIC BLOOD PRESSURE: 140 MMHG | HEART RATE: 67 BPM | WEIGHT: 193.81 LBS | OXYGEN SATURATION: 100 %

## 2022-01-20 LAB
ANION GAP SERPL CALC-SCNC: 5 MMOL/L (ref 0–18)
ATRIAL RATE: 81 BPM
BUN BLD-MCNC: 21 MG/DL (ref 7–18)
CALCIUM BLD-MCNC: 9 MG/DL (ref 8.5–10.1)
CHLORIDE SERPL-SCNC: 107 MMOL/L (ref 98–112)
CO2 SERPL-SCNC: 26 MMOL/L (ref 21–32)
CREAT BLD-MCNC: 1.22 MG/DL
GLUCOSE BLD-MCNC: 94 MG/DL (ref 70–99)
MAGNESIUM SERPL-MCNC: 2 MG/DL (ref 1.6–2.6)
OSMOLALITY SERPL CALC.SUM OF ELEC: 289 MOSM/KG (ref 275–295)
P AXIS: 76 DEGREES
P-R INTERVAL: 210 MS
POTASSIUM SERPL-SCNC: 4.2 MMOL/L (ref 3.5–5.1)
Q-T INTERVAL: 378 MS
QRS DURATION: 96 MS
QTC CALCULATION (BEZET): 439 MS
R AXIS: 23 DEGREES
SODIUM SERPL-SCNC: 138 MMOL/L (ref 136–145)
T AXIS: 47 DEGREES
VENTRICULAR RATE: 81 BPM

## 2022-01-20 PROCEDURE — 93306 TTE W/DOPPLER COMPLETE: CPT | Performed by: HOSPITALIST

## 2022-01-20 PROCEDURE — 99217 OBSERVATION CARE DISCHARGE: CPT | Performed by: HOSPITALIST

## 2022-01-20 RX ORDER — SODIUM CHLORIDE 9 MG/ML
INJECTION, SOLUTION INTRAVENOUS CONTINUOUS
Status: DISCONTINUED | OUTPATIENT
Start: 2022-01-20 | End: 2022-01-20

## 2022-01-20 NOTE — PLAN OF CARE
Received patient from ED. Admission navigator completed. Patient is alert and oriented. On room air, NSR on tele. Patient has abrasion on nose from glasses. Patient denies any pain, SOB or dizziness.  POC updated with the patient, all questions answer

## 2022-01-20 NOTE — PROGRESS NOTES
Cardiology follow-up  Patient is less orthostatic after IV fluids. Case discussed with EP who agrees with beta-blocker, no amiodarone no PCD now and outpatient event monitor.  Will discharge patient

## 2022-01-20 NOTE — PLAN OF CARE
Patient alert and oriented x4 on room air , sinus on cardiac monitor with PVC, frequent V tach , orthostatic v/s positive, while active HR elevated 150's , called  cardiologist  Dr Donal Serrano  Informed patient status , will adjust meds in am , bmp and EKG in a color and temperature  - Assess for signs of decreased coronary artery perfusion - ex.  Angina  - Evaluate fluid balance, assess for edema, trend weights  1/20/2022 0214 by Mely Cruz RN  Outcome: Progressing  1/20/2022 0123 by Mely Cruz RN patient’s ability to void and empty bladder  - Monitor intake/output and perform bladder scan as needed  - Follow urinary retention protocol/standard of care  - Consider collaborating with pharmacy to review patient's medication profile  - Jaspreet patterson Maintains hematologic stability  Description: INTERVENTIONS  - Assess for signs and symptoms of bleeding or hemorrhage  - Monitor labs and vital signs for trends  - Administer supportive blood products/factors, fluids and medications as ordered and appropr

## 2022-01-20 NOTE — PROGRESS NOTES
[facility]     Hospitalist Progress Note     James Commander Patient Status:  Observation    1940 MRN [id number]   [facility] 2NE-A Attending Ean Duran MD   Hosp Day # 0 PCP Merrill Wright MD     Chief Complaint: syncope reviewed in Epic.     Medications:   • aspirin  81 mg Oral Daily   • Multivitamin Chewtab (ADULT)  1 tablet Oral Daily   • famotidine  10 mg Oral Nightly   • [Held by provider] rivaroxaban  15 mg Oral Daily with food   • lisinopril  20 mg Oral Nightly   • a

## 2022-01-20 NOTE — PROGRESS NOTES
01/20/22 0059 01/20/22 0100 01/20/22 0102   Vital Signs   Temp 97.8 °F (36.6 °C)  --   --    Temp src Oral  --   --    Pulse 79 89 92   Heart Rate Source Monitor  --   --    Cardiac Rhythm NSR  --   --    Resp 18  --   --    Respiratory Quality Normal

## 2022-01-20 NOTE — PROGRESS NOTES
[facility] Cardiology Progress Note - Coby Wright Patient Status:  Observation    1940 MRN [id number]   [facility] 2NE-A Attending Janette Catalan MD   Hosp Day # 0 PCP Elena Harden MD     Subjective:  Russel Arrow 1/20/2022 0449  Gross per 24 hour   Intake 250 ml   Output —   Net 250 ml       Last 3 Weights  01/19/22 1650 : 193 lb 12.6 oz (87.9 kg)  11/29/21 1552 : 201 lb (91.2 kg)  11/02/21 1047 : 203 lb (92.1 kg)      Tele: NSR    Physical Exam:    General: Alert BID  acetaminophen (TYLENOL) tab 650 mg, 650 mg, Oral, Q6H PRN  melatonin tab 3 mg, 3 mg, Oral, Nightly PRN  ondansetron (ZOFRAN) injection 4 mg, 4 mg, Intravenous, Q6H PRN  metoclopramide (REGLAN) injection 5 mg, 5 mg, Intravenous, Q8H PRN  polyethylene g

## 2022-01-20 NOTE — CONSULTS
[facility] Pharmacy Note:  Renal Dose Adjustment for Xarelto  Marciano Varela has been prescribed Xarelto 20 mg subcutaneously every 24 hours. Estimated Creatinine Clearance: 44.3 mL/min (A) (based on SCr of 1.47 mg/dL (H)).     Calculated creatinine cleara

## 2022-01-21 ENCOUNTER — PATIENT OUTREACH (OUTPATIENT)
Dept: CASE MANAGEMENT | Age: 82
End: 2022-01-21

## 2022-01-21 DIAGNOSIS — Z02.9 ENCOUNTERS FOR ADMINISTRATIVE PURPOSE: ICD-10-CM

## 2022-01-21 LAB
ATRIAL RATE: 77 BPM
ATRIAL RATE: 80 BPM
P AXIS: 64 DEGREES
P AXIS: 74 DEGREES
P-R INTERVAL: 212 MS
P-R INTERVAL: 236 MS
Q-T INTERVAL: 384 MS
Q-T INTERVAL: 390 MS
QRS DURATION: 100 MS
QRS DURATION: 102 MS
QTC CALCULATION (BEZET): 420 MS
QTC CALCULATION (BEZET): 441 MS
R AXIS: 20 DEGREES
R AXIS: 4 DEGREES
T AXIS: 50 DEGREES
T AXIS: 6 DEGREES
VENTRICULAR RATE: 72 BPM
VENTRICULAR RATE: 77 BPM

## 2022-01-21 PROCEDURE — 1111F DSCHRG MED/CURRENT MED MERGE: CPT

## 2022-01-21 NOTE — PROGRESS NOTES
Patient had no questions after going through dc paperwork. He will be wheeled out to a private vehicle to home with his wife.

## 2022-01-21 NOTE — PLAN OF CARE
Patient is A&Ox4. He is on RA. He is on tele. SR with [facility]. Some PVCs noted. He did have some Vtach during the night and 7 beat run right before discharge. He walks SBA to BR. He will get an event monitor tomorrow over in the clinic.         Pr

## 2022-01-21 NOTE — PROGRESS NOTES
Initial Post Discharge Follow Up   Discharge Date: 1/20/22  Contact Date: 1/21/2022    Consent Verification:  Assessment Completed With: Patient  HIPAA Verified?   Yes     Discharge Dx:   Syncope, near      Was TCC ordered: yes, pt declined; prefers to s AVS?  no  • May I go over your medications with you to make sure we are not missing anything? yes  • Are there any reasons that keep you from taking your medication as prescribed? No  Are you having any concerns with constipation?  No    Referrals/orders at understanding and look forward to caring for you,    [address] [place] [address]      Nov 17, 2022  1:00 PM CST In-Office Procedure Visit with Ewelina Lopes MD Urology - [address] [facility] [address] [place] [address])    Hello,    Due to the current COVID appointments? no, pt states that he will call Dr. Rayna Andrade shortly to schedule the event monitor     Is there any reason as to why you cannot make your appointments?    No     NCM Reviewed upcoming Specialist Appt with patient     Yes    Interventions

## 2022-01-23 NOTE — DISCHARGE SUMMARY
[facility] HOSPITALIST  DISCHARGE SUMMARY     Earline Russell Patient Status:  Observation    1940 MRN [id number]   [facility] 2NE-A Attending No att. providers found   Hosp Day # 0 PCP Porsha Benton MD     Date of Admission: 2022 TABLET BY MOUTH EVERY DAY   Quantity: 90 tablet  Refills: 1     famotidine 10 MG Tabs  Commonly known as: PEPCID      Take 10 mg by mouth 2 (two) times daily.    Refills: 0     hydrALAZINE 100 MG Tabs  Commonly known as: APRESOLINE      TAKE 1 TABLET BY NAY Ronan Cano MD    Time spent:  > 30 minutes

## 2022-01-28 ENCOUNTER — OFFICE VISIT (OUTPATIENT)
Dept: INTERNAL MEDICINE CLINIC | Facility: CLINIC | Age: 82
End: 2022-01-28
Payer: MEDICARE

## 2022-01-28 VITALS
HEART RATE: 68 BPM | TEMPERATURE: 98 F | HEIGHT: 74 IN | BODY MASS INDEX: 25.41 KG/M2 | SYSTOLIC BLOOD PRESSURE: 118 MMHG | DIASTOLIC BLOOD PRESSURE: 72 MMHG | WEIGHT: 198 LBS | RESPIRATION RATE: 16 BRPM | OXYGEN SATURATION: 98 %

## 2022-01-28 DIAGNOSIS — I47.2 NSVT (NONSUSTAINED VENTRICULAR TACHYCARDIA) (HCC): ICD-10-CM

## 2022-01-28 DIAGNOSIS — R55 SYNCOPE, NEAR: ICD-10-CM

## 2022-01-28 DIAGNOSIS — Z09 HOSPITAL DISCHARGE FOLLOW-UP: Primary | ICD-10-CM

## 2022-01-28 PROBLEM — R73.9 HYPERGLYCEMIA: Status: RESOLVED | Noted: 2022-01-19 | Resolved: 2022-01-28

## 2022-01-28 PROBLEM — J42 CHRONIC BRONCHITIS, UNSPECIFIED CHRONIC BRONCHITIS TYPE (HCC): Status: RESOLVED | Noted: 2021-11-02 | Resolved: 2022-01-28

## 2022-01-28 PROBLEM — R00.1 BRADYCARDIA, UNSPECIFIED: Status: RESOLVED | Noted: 2022-01-19 | Resolved: 2022-01-28

## 2022-01-28 PROCEDURE — 99496 TRANSJ CARE MGMT HIGH F2F 7D: CPT | Performed by: INTERNAL MEDICINE

## 2022-01-28 NOTE — PROGRESS NOTES
HPI:    Aamir Ocampo is a 78year old male here today for hospital follow up.    He was discharged from Inpatient hospital, [facility] to Home   Admission Date: 1/19/22   Discharge Date: 1/20/22  Hospital Discharge Diagnoses (since 12/29/2021) not had any decrease in his oral intake or diarrhea recently.     Patient was admitted secondary to syncope. He had mild orthostatic hypotension which resolved with fluid hydration. He had a run of nonsustained ventricular tachycardia.   He was stable for essential hypertension, and Visual impairment.     He  has a past surgical history that includes colonoscopy (9/8/2004, 2011); cholecystectomy; cath percutaneous  transluminal coronary angioplasty; tonsillectomy; splint, hammer toe; cabg (1993); open heart near-syncope/syncope- stay hydrated. Cont current med plan    No orders of the defined types were placed in this encounter.       Meds & Refills for this Visit:  Requested Prescriptions      No prescriptions requested or ordered in this encounter       Imag

## 2022-04-08 RX ORDER — LISINOPRIL 20 MG/1
TABLET ORAL
Qty: 90 TABLET | Refills: 1 | Status: SHIPPED | OUTPATIENT
Start: 2022-04-08

## 2022-04-08 RX ORDER — ATORVASTATIN CALCIUM 10 MG/1
TABLET, FILM COATED ORAL
Qty: 90 TABLET | Refills: 1 | Status: SHIPPED | OUTPATIENT
Start: 2022-04-08

## 2022-04-08 NOTE — TELEPHONE ENCOUNTER
Last time medication was refilled: 10/22/21  Quantity and # of refills 90 tabs w/1 refill   Last OV  1/28/22  Next OV: 5/3/22

## 2022-05-23 ENCOUNTER — HOSPITAL ENCOUNTER (OUTPATIENT)
Dept: INTERVENTIONAL RADIOLOGY/VASCULAR | Facility: HOSPITAL | Age: 82
Discharge: HOME | End: 2022-05-23
Attending: INTERNAL MEDICINE
Payer: MEDICARE

## 2022-05-25 ENCOUNTER — OFFICE VISIT (OUTPATIENT)
Dept: INTERNAL MEDICINE CLINIC | Facility: CLINIC | Age: 82
End: 2022-05-25
Payer: MEDICARE

## 2022-05-25 VITALS
WEIGHT: 211 LBS | HEIGHT: 74 IN | TEMPERATURE: 98 F | HEART RATE: 73 BPM | DIASTOLIC BLOOD PRESSURE: 82 MMHG | RESPIRATION RATE: 16 BRPM | BODY MASS INDEX: 27.08 KG/M2 | OXYGEN SATURATION: 99 % | SYSTOLIC BLOOD PRESSURE: 128 MMHG

## 2022-05-25 DIAGNOSIS — I48.92 ATRIAL FLUTTER, PAROXYSMAL (HCC): ICD-10-CM

## 2022-05-25 DIAGNOSIS — H90.3 SENSORINEURAL HEARING LOSS (SNHL) OF BOTH EARS: ICD-10-CM

## 2022-05-25 DIAGNOSIS — I25.10 CAD IN NATIVE ARTERY: ICD-10-CM

## 2022-05-25 DIAGNOSIS — I10 BENIGN ESSENTIAL HTN: Primary | ICD-10-CM

## 2022-05-25 PROCEDURE — 99214 OFFICE O/P EST MOD 30 MIN: CPT | Performed by: INTERNAL MEDICINE

## 2022-07-07 RX ORDER — ACETAMINOPHEN 325 MG/1
650 TABLET ORAL 2 TIMES DAILY
COMMUNITY

## 2022-07-11 ENCOUNTER — HOSPITAL ENCOUNTER (OUTPATIENT)
Dept: INTERVENTIONAL RADIOLOGY/VASCULAR | Facility: HOSPITAL | Age: 82
Discharge: HOME | End: 2022-07-11
Attending: INTERNAL MEDICINE | Admitting: INTERNAL MEDICINE
Payer: MEDICARE

## 2022-07-11 VITALS
SYSTOLIC BLOOD PRESSURE: 150 MMHG | WEIGHT: 211 LBS | OXYGEN SATURATION: 95 % | RESPIRATION RATE: 14 BRPM | HEART RATE: 61 BPM | BODY MASS INDEX: 27.08 KG/M2 | HEIGHT: 74 IN | TEMPERATURE: 97 F | DIASTOLIC BLOOD PRESSURE: 89 MMHG

## 2022-07-11 DIAGNOSIS — I47.2 NSVT (NONSUSTAINED VENTRICULAR TACHYCARDIA) (HCC): ICD-10-CM

## 2022-07-11 DIAGNOSIS — R55 SYNCOPE: ICD-10-CM

## 2022-07-11 LAB
ATRIAL RATE: 85 BPM
P AXIS: 52 DEGREES
P-R INTERVAL: 242 MS
Q-T INTERVAL: 374 MS
QRS DURATION: 98 MS
QTC CALCULATION (BEZET): 445 MS
R AXIS: 25 DEGREES
T AXIS: 17 DEGREES
VENTRICULAR RATE: 85 BPM

## 2022-07-11 PROCEDURE — 99152 MOD SED SAME PHYS/QHP 5/>YRS: CPT | Performed by: INTERNAL MEDICINE

## 2022-07-11 PROCEDURE — 93005 ELECTROCARDIOGRAM TRACING: CPT

## 2022-07-11 PROCEDURE — 93620 COMP EP EVL R AT VEN PAC&REC: CPT | Performed by: INTERNAL MEDICINE

## 2022-07-11 PROCEDURE — 4A0234Z MEASUREMENT OF CARDIAC ELECTRICAL ACTIVITY, PERCUTANEOUS APPROACH: ICD-10-PCS | Performed by: INTERNAL MEDICINE

## 2022-07-11 PROCEDURE — 93010 ELECTROCARDIOGRAM REPORT: CPT | Performed by: INTERNAL MEDICINE

## 2022-07-11 PROCEDURE — 4A023FZ MEASUREMENT OF CARDIAC RHYTHM, PERCUTANEOUS APPROACH: ICD-10-PCS | Performed by: INTERNAL MEDICINE

## 2022-07-11 RX ORDER — CHLORHEXIDINE GLUCONATE 4 G/100ML
30 SOLUTION TOPICAL
Status: DISCONTINUED | OUTPATIENT
Start: 2022-07-11 | End: 2022-07-11 | Stop reason: HOSPADM

## 2022-07-11 RX ORDER — LIDOCAINE HYDROCHLORIDE 10 MG/ML
INJECTION, SOLUTION EPIDURAL; INFILTRATION; INTRACAUDAL; PERINEURAL
Status: COMPLETED
Start: 2022-07-11 | End: 2022-07-11

## 2022-07-11 RX ORDER — SODIUM CHLORIDE 9 MG/ML
INJECTION, SOLUTION INTRAVENOUS
Status: DISCONTINUED | OUTPATIENT
Start: 2022-07-12 | End: 2022-07-11 | Stop reason: HOSPADM

## 2022-07-11 RX ORDER — MIDAZOLAM HYDROCHLORIDE 1 MG/ML
INJECTION INTRAMUSCULAR; INTRAVENOUS
Status: COMPLETED
Start: 2022-07-11 | End: 2022-07-11

## 2022-07-11 RX ORDER — HEPARIN SODIUM 5000 [USP'U]/ML
INJECTION, SOLUTION INTRAVENOUS; SUBCUTANEOUS
Status: COMPLETED
Start: 2022-07-11 | End: 2022-07-11

## 2022-07-11 NOTE — PROGRESS NOTES
Pt s/p EP study with Dr. Xuan Donohue. Pt recovered in holding. Wife at bedside. Right groin dressing cdi, no bleeding or hematoma. +pedal pulse. Pt flat for one hour then HOB elevated. Pt ate and drank. Discharge instructions reviewed with pt and wife, copy given. Dr. Xuan Donohue came and spoke to pt wife at bedside. After recovery, pt up to [place] with RN and bathroom. Right groin dressing remained cdi, site soft. IV removed. Pt dressed. Pt discharged to [facility] via wheelchair by volunteer. Pt left with belongings. Pt wife drove pt home.

## 2022-07-13 DIAGNOSIS — I10 ESSENTIAL HYPERTENSION: ICD-10-CM

## 2022-07-13 RX ORDER — HYDRALAZINE HYDROCHLORIDE 100 MG/1
TABLET, FILM COATED ORAL
Qty: 180 TABLET | Refills: 1 | Status: SHIPPED | OUTPATIENT
Start: 2022-07-13

## 2022-07-13 RX ORDER — METOPROLOL SUCCINATE 50 MG/1
TABLET, EXTENDED RELEASE ORAL
Qty: 180 TABLET | Refills: 1 | Status: SHIPPED | OUTPATIENT
Start: 2022-07-13

## 2022-07-13 NOTE — TELEPHONE ENCOUNTER
Last time medication was refilled  Hydralazine: 1/12/22. 180 tabs w/1 refills   Metoprolol: 1/16/21. 180 tabs w/1 refills   Last OV 5/25/22  Next OV   Future Appointments   Date Time Provider Rafal Wu   11/14/2022 11:30 AM Manny Juarez MD [facility] [address] [facility] [address]     Per protocol routed to provider

## 2022-07-27 ENCOUNTER — OFFICE VISIT (OUTPATIENT)
Facility: LOCATION | Age: 82
End: 2022-07-27
Payer: MEDICARE

## 2022-07-27 VITALS
DIASTOLIC BLOOD PRESSURE: 82 MMHG | BODY MASS INDEX: 26.44 KG/M2 | WEIGHT: 206 LBS | SYSTOLIC BLOOD PRESSURE: 142 MMHG | TEMPERATURE: 97 F | HEIGHT: 74 IN

## 2022-07-27 DIAGNOSIS — H90.3 SENSORINEURAL HEARING LOSS (SNHL) OF BOTH EARS: Primary | ICD-10-CM

## 2022-07-27 PROCEDURE — 99203 OFFICE O/P NEW LOW 30 MIN: CPT | Performed by: OTOLARYNGOLOGY

## 2022-07-27 PROCEDURE — 1111F DSCHRG MED/CURRENT MED MERGE: CPT | Performed by: OTOLARYNGOLOGY

## 2022-11-01 DIAGNOSIS — I10 ESSENTIAL HYPERTENSION: ICD-10-CM

## 2022-11-01 DIAGNOSIS — Z85.828 PERSONAL HISTORY OF OTHER MALIGNANT NEOPLASM OF SKIN: ICD-10-CM

## 2022-11-01 DIAGNOSIS — I10 ESSENTIAL HYPERTENSION, BENIGN: ICD-10-CM

## 2022-11-01 DIAGNOSIS — I48.92 ATRIAL FLUTTER, PAROXYSMAL (HCC): ICD-10-CM

## 2022-11-01 DIAGNOSIS — E78.2 MIXED HYPERLIPIDEMIA: ICD-10-CM

## 2022-11-01 RX ORDER — LISINOPRIL 20 MG/1
TABLET ORAL
Qty: 90 TABLET | Refills: 0 | Status: SHIPPED | OUTPATIENT
Start: 2022-11-01

## 2022-11-01 RX ORDER — ATORVASTATIN CALCIUM 10 MG/1
TABLET, FILM COATED ORAL
Qty: 90 TABLET | Refills: 0 | Status: SHIPPED | OUTPATIENT
Start: 2022-11-01

## 2022-11-01 RX ORDER — METOPROLOL SUCCINATE 50 MG/1
TABLET, EXTENDED RELEASE ORAL
Qty: 180 TABLET | Refills: 1 | OUTPATIENT
Start: 2022-11-01

## 2022-11-14 ENCOUNTER — OFFICE VISIT (OUTPATIENT)
Dept: INTERNAL MEDICINE CLINIC | Facility: CLINIC | Age: 82
End: 2022-11-14
Payer: MEDICARE

## 2022-11-14 VITALS
HEART RATE: 76 BPM | TEMPERATURE: 98 F | HEIGHT: 74 IN | SYSTOLIC BLOOD PRESSURE: 138 MMHG | WEIGHT: 199 LBS | OXYGEN SATURATION: 97 % | RESPIRATION RATE: 16 BRPM | DIASTOLIC BLOOD PRESSURE: 80 MMHG | BODY MASS INDEX: 25.54 KG/M2

## 2022-11-14 DIAGNOSIS — Z00.00 ANNUAL PHYSICAL EXAM: Primary | ICD-10-CM

## 2022-11-14 DIAGNOSIS — I25.10 CAD IN NATIVE ARTERY: ICD-10-CM

## 2022-11-14 DIAGNOSIS — N05.0 MINIMAL CHANGE GLOMERULAR DISEASE: ICD-10-CM

## 2022-11-14 DIAGNOSIS — N18.30 STAGE 3 CHRONIC KIDNEY DISEASE, UNSPECIFIED WHETHER STAGE 3A OR 3B CKD (HCC): ICD-10-CM

## 2022-11-14 DIAGNOSIS — N40.1 BPH WITH OBSTRUCTION/LOWER URINARY TRACT SYMPTOMS: ICD-10-CM

## 2022-11-14 DIAGNOSIS — N13.8 BPH WITH OBSTRUCTION/LOWER URINARY TRACT SYMPTOMS: ICD-10-CM

## 2022-11-14 DIAGNOSIS — I10 BENIGN ESSENTIAL HTN: ICD-10-CM

## 2022-11-14 DIAGNOSIS — Z23 NEED FOR INFLUENZA VACCINATION: ICD-10-CM

## 2022-11-14 DIAGNOSIS — I35.8 AORTIC VALVE SCLEROSIS: ICD-10-CM

## 2022-11-14 DIAGNOSIS — R91.8 LUNG NODULES: ICD-10-CM

## 2022-11-14 DIAGNOSIS — Z85.528 HISTORY OF RENAL CELL CARCINOMA: ICD-10-CM

## 2022-11-14 DIAGNOSIS — I50.32 CHRONIC DIASTOLIC HEART FAILURE (HCC): ICD-10-CM

## 2022-11-14 DIAGNOSIS — I71.43 INFRARENAL ABDOMINAL AORTIC ANEURYSM (AAA) WITHOUT RUPTURE: ICD-10-CM

## 2022-11-14 DIAGNOSIS — E78.2 MIXED HYPERLIPIDEMIA: ICD-10-CM

## 2022-11-14 DIAGNOSIS — N04.9 NEPHROTIC SYNDROME: ICD-10-CM

## 2022-11-14 DIAGNOSIS — Z00.00 ENCOUNTER FOR ANNUAL HEALTH EXAMINATION: ICD-10-CM

## 2022-11-14 DIAGNOSIS — I48.92 ATRIAL FLUTTER, PAROXYSMAL (HCC): ICD-10-CM

## 2022-11-14 PROBLEM — L97.521 ULCER OF LEFT FOOT, LIMITED TO BREAKDOWN OF SKIN (HCC): Status: RESOLVED | Noted: 2022-11-14 | Resolved: 2022-11-14

## 2022-11-14 PROBLEM — L97.521 ULCER OF LEFT FOOT, LIMITED TO BREAKDOWN OF SKIN (HCC): Status: ACTIVE | Noted: 2022-11-14

## 2022-11-14 PROCEDURE — G0008 ADMIN INFLUENZA VIRUS VAC: HCPCS | Performed by: INTERNAL MEDICINE

## 2022-11-14 PROCEDURE — 1125F AMNT PAIN NOTED PAIN PRSNT: CPT | Performed by: INTERNAL MEDICINE

## 2022-11-14 PROCEDURE — 90662 IIV NO PRSV INCREASED AG IM: CPT | Performed by: INTERNAL MEDICINE

## 2022-11-14 PROCEDURE — G0439 PPPS, SUBSEQ VISIT: HCPCS | Performed by: INTERNAL MEDICINE

## 2022-11-15 ENCOUNTER — APPOINTMENT (OUTPATIENT)
Dept: GENERAL RADIOLOGY | Facility: HOSPITAL | Age: 82
End: 2022-11-15
Attending: EMERGENCY MEDICINE
Payer: MEDICARE

## 2022-11-15 ENCOUNTER — TELEPHONE (OUTPATIENT)
Dept: INTERNAL MEDICINE CLINIC | Facility: CLINIC | Age: 82
End: 2022-11-15

## 2022-11-15 ENCOUNTER — HOSPITAL ENCOUNTER (EMERGENCY)
Facility: HOSPITAL | Age: 82
Discharge: HOME | End: 2022-11-15
Attending: EMERGENCY MEDICINE
Payer: MEDICARE

## 2022-11-15 ENCOUNTER — APPOINTMENT (OUTPATIENT)
Dept: CT IMAGING | Facility: HOSPITAL | Age: 82
End: 2022-11-15
Attending: EMERGENCY MEDICINE
Payer: MEDICARE

## 2022-11-15 VITALS
DIASTOLIC BLOOD PRESSURE: 61 MMHG | SYSTOLIC BLOOD PRESSURE: 125 MMHG | BODY MASS INDEX: 25.54 KG/M2 | HEART RATE: 75 BPM | HEIGHT: 74 IN | RESPIRATION RATE: 20 BRPM | TEMPERATURE: 97 F | WEIGHT: 199 LBS | OXYGEN SATURATION: 96 %

## 2022-11-15 DIAGNOSIS — R29.898 WEAKNESS OF BOTH LEGS: Primary | ICD-10-CM

## 2022-11-15 DIAGNOSIS — R53.1 WEAKNESS GENERALIZED: ICD-10-CM
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
## 2022-11-15 LAB
ALBUMIN SERPL-MCNC: 3.2 G/DL (ref 3.4–5)
ALBUMIN/GLOB SERPL: 0.8 {RATIO} (ref 1–2)
ALP LIVER SERPL-CCNC: 66 U/L
ALT SERPL-CCNC: 23 U/L
ANION GAP SERPL CALC-SCNC: 6 MMOL/L (ref 0–18)
AST SERPL-CCNC: 20 U/L (ref 15–37)
ATRIAL RATE: 66 BPM
ATRIAL RATE: 67 BPM
BASOPHILS # BLD AUTO: 0.05 X10(3) UL (ref 0–0.2)
BASOPHILS NFR BLD AUTO: 0.4 %
BILIRUB SERPL-MCNC: 0.4 MG/DL (ref 0.1–2)
BILIRUB UR QL STRIP.AUTO: NEGATIVE
BUN BLD-MCNC: 35 MG/DL (ref 7–18)
CALCIUM BLD-MCNC: 9.4 MG/DL (ref 8.5–10.1)
CHLORIDE SERPL-SCNC: 109 MMOL/L (ref 98–112)
CLARITY UR REFRACT.AUTO: CLEAR
CO2 SERPL-SCNC: 23 MMOL/L (ref 21–32)
COLOR UR AUTO: YELLOW
CREAT BLD-MCNC: 1.36 MG/DL
EOSINOPHIL # BLD AUTO: 0.05 X10(3) UL (ref 0–0.7)
EOSINOPHIL NFR BLD AUTO: 0.4 %
ERYTHROCYTE [DISTWIDTH] IN BLOOD BY AUTOMATED COUNT: 12.3 %
GFR SERPLBLD BASED ON 1.73 SQ M-ARVRAT: 51 ML/MIN/1.73M2 (ref 60–?)
GLOBULIN PLAS-MCNC: 4.1 G/DL (ref 2.8–4.4)
GLUCOSE BLD-MCNC: 116 MG/DL (ref 70–99)
GLUCOSE UR STRIP.AUTO-MCNC: NEGATIVE MG/DL
HCT VFR BLD AUTO: 40 %
HGB BLD-MCNC: 13.4 G/DL
IMM GRANULOCYTES # BLD AUTO: 0.06 X10(3) UL (ref 0–1)
IMM GRANULOCYTES NFR BLD: 0.5 %
KETONES UR STRIP.AUTO-MCNC: NEGATIVE MG/DL
LYMPHOCYTES # BLD AUTO: 2.04 X10(3) UL (ref 1–4)
LYMPHOCYTES NFR BLD AUTO: 16 %
MAGNESIUM SERPL-MCNC: 2 MG/DL (ref 1.6–2.6)
MCH RBC QN AUTO: 29.9 PG (ref 26–34)
MCHC RBC AUTO-ENTMCNC: 33.5 G/DL (ref 31–37)
MCV RBC AUTO: 89.3 FL
MONOCYTES # BLD AUTO: 1.11 X10(3) UL (ref 0.1–1)
MONOCYTES NFR BLD AUTO: 8.7 %
NEUTROPHILS # BLD AUTO: 9.45 X10 (3) UL (ref 1.5–7.7)
NEUTROPHILS # BLD AUTO: 9.45 X10(3) UL (ref 1.5–7.7)
NEUTROPHILS NFR BLD AUTO: 74 %
NITRITE UR QL STRIP.AUTO: NEGATIVE
OSMOLALITY SERPL CALC.SUM OF ELEC: 295 MOSM/KG (ref 275–295)
P AXIS: 64 DEGREES
P AXIS: 70 DEGREES
P-R INTERVAL: 184 MS
P-R INTERVAL: 188 MS
PH UR STRIP.AUTO: 5 [PH] (ref 5–8)
PLATELET # BLD AUTO: 235 10(3)UL (ref 150–450)
POTASSIUM SERPL-SCNC: 4.5 MMOL/L (ref 3.5–5.1)
PROCALCITONIN SERPL-MCNC: <0.05 NG/ML (ref ?–0.16)
PROT SERPL-MCNC: 7.3 G/DL (ref 6.4–8.2)
PROT UR STRIP.AUTO-MCNC: NEGATIVE MG/DL
Q-T INTERVAL: 398 MS
Q-T INTERVAL: 408 MS
QRS DURATION: 110 MS
QRS DURATION: 98 MS
QTC CALCULATION (BEZET): 420 MS
QTC CALCULATION (BEZET): 427 MS
R AXIS: 19 DEGREES
R AXIS: 20 DEGREES
RBC # BLD AUTO: 4.48 X10(6)UL
RBC UR QL AUTO: NEGATIVE
SODIUM SERPL-SCNC: 138 MMOL/L (ref 136–145)
SP GR UR STRIP.AUTO: 1.02 (ref 1–1.03)
T AXIS: 33 DEGREES
T AXIS: 42 DEGREES
TROPONIN I HIGH SENSITIVITY: 11 NG/L
UROBILINOGEN UR STRIP.AUTO-MCNC: <2 MG/DL
VENTRICULAR RATE: 66 BPM
VENTRICULAR RATE: 67 BPM
WBC # BLD AUTO: 12.8 X10(3) UL (ref 4–11)

## 2022-11-15 PROCEDURE — 80053 COMPREHEN METABOLIC PANEL: CPT | Performed by: EMERGENCY MEDICINE

## 2022-11-15 PROCEDURE — 71045 X-RAY EXAM CHEST 1 VIEW: CPT | Performed by: EMERGENCY MEDICINE

## 2022-11-15 PROCEDURE — 81001 URINALYSIS AUTO W/SCOPE: CPT | Performed by: EMERGENCY MEDICINE

## 2022-11-15 PROCEDURE — 84145 PROCALCITONIN (PCT): CPT | Performed by: EMERGENCY MEDICINE

## 2022-11-15 PROCEDURE — 99284 EMERGENCY DEPT VISIT MOD MDM: CPT

## 2022-11-15 PROCEDURE — 85025 COMPLETE CBC W/AUTO DIFF WBC: CPT | Performed by: EMERGENCY MEDICINE

## 2022-11-15 PROCEDURE — 87086 URINE CULTURE/COLONY COUNT: CPT | Performed by: EMERGENCY MEDICINE

## 2022-11-15 PROCEDURE — 99285 EMERGENCY DEPT VISIT HI MDM: CPT

## 2022-11-15 PROCEDURE — 84484 ASSAY OF TROPONIN QUANT: CPT | Performed by: EMERGENCY MEDICINE

## 2022-11-15 PROCEDURE — 83735 ASSAY OF MAGNESIUM: CPT | Performed by: EMERGENCY MEDICINE

## 2022-11-15 PROCEDURE — 93005 ELECTROCARDIOGRAM TRACING: CPT

## 2022-11-15 PROCEDURE — 73552 X-RAY EXAM OF FEMUR 2/>: CPT | Performed by: EMERGENCY MEDICINE

## 2022-11-15 PROCEDURE — 36415 COLL VENOUS BLD VENIPUNCTURE: CPT

## 2022-11-15 PROCEDURE — 70450 CT HEAD/BRAIN W/O DYE: CPT | Performed by: EMERGENCY MEDICINE

## 2022-11-15 NOTE — TELEPHONE ENCOUNTER
Yesterday around 9pm the patient was not able to bare weight on his right leg and he fell going up the stairs. He ended up in the hospital with paramedics called to take him. Pt is still feeling shaky no diagnosis or known cause when evaluated at ER. Pt and spouse calling to discuss with Dr. Tania Clay. \"just want a phone call\"     Please call to discuss.      Thank you

## 2022-11-15 NOTE — ED INITIAL ASSESSMENT (HPI)
Patient reports feeling weakness in his legs that started yesterday evening. Patient was able to ambulate to the bathroom and then on the way back to bed stumbled to the ground and was unable to get back up. Reports difficulty with his right hip.

## 2022-11-15 NOTE — ED QUICK NOTES
Pt resting on cart. Pt is awake, alert and breathing non labored. Wife at bedside. Denies any complaints. Aware of need for urine sample.

## 2022-11-15 NOTE — ED QUICK NOTES
Report from Mariano Ansari. Pt is awake, alert, conversing and breathing with ease. Full ROM to bilateral lower extremities with 5/5 strength. Denies pain.

## 2022-11-15 NOTE — ED QUICK NOTES
PCT assisted PT to road test to the bathroom no assist no walker. PT does complain of right leg pain.

## 2022-11-15 NOTE — TELEPHONE ENCOUNTER
Spoke with jean-claude, pt is weak in his both LE  Pt was completely fine when he came in to see Dr. Sade Romano on 11/14/22  Per Dr. Sade Romano order physical therapy to get strength built in BLE  Referral placed, contact information provided to spouse

## 2022-11-17 ENCOUNTER — TELEPHONE (OUTPATIENT)
Dept: INTERNAL MEDICINE CLINIC | Facility: CLINIC | Age: 82
End: 2022-11-17

## 2022-11-17 NOTE — TELEPHONE ENCOUNTER
Pt is still feeling week and he cant put weight on them or have the strength to get off a chair     They cant get into PT for 2 weeks     Wife wants to know if this could be a side effect to the flu shot or are his medications off? Please call to discuss. If they need to be seen in office they would need help because he cant walk.

## 2022-11-17 NOTE — TELEPHONE ENCOUNTER
Spoke with spouse again today, same symptoms of weakness to both LE  Awaiting to get in for PT, another 2 weeks  Spouse is concerned  Advised to come in for ER follow up  Understanding vebalized  appt given

## 2022-11-18 ENCOUNTER — OFFICE VISIT (OUTPATIENT)
Dept: INTERNAL MEDICINE CLINIC | Facility: CLINIC | Age: 82
End: 2022-11-18
Payer: MEDICARE

## 2022-11-18 VITALS
WEIGHT: 199 LBS | OXYGEN SATURATION: 97 % | HEART RATE: 53 BPM | TEMPERATURE: 98 F | BODY MASS INDEX: 25.54 KG/M2 | SYSTOLIC BLOOD PRESSURE: 110 MMHG | RESPIRATION RATE: 16 BRPM | DIASTOLIC BLOOD PRESSURE: 58 MMHG | HEIGHT: 74 IN

## 2022-11-18 DIAGNOSIS — M17.0 BILATERAL PRIMARY OSTEOARTHRITIS OF KNEE: ICD-10-CM

## 2022-11-18 DIAGNOSIS — R29.898 LEG WEAKNESS, BILATERAL: Primary | ICD-10-CM

## 2022-11-18 PROCEDURE — 99214 OFFICE O/P EST MOD 30 MIN: CPT | Performed by: INTERNAL MEDICINE

## 2022-11-18 RX ORDER — PREDNISONE 20 MG/1
TABLET ORAL
Qty: 14 TABLET | Refills: 0 | Status: SHIPPED | OUTPATIENT
Start: 2022-11-18

## 2022-11-28 ENCOUNTER — TELEPHONE (OUTPATIENT)
Dept: INTERNAL MEDICINE CLINIC | Facility: CLINIC | Age: 82
End: 2022-11-28

## 2022-11-28 NOTE — TELEPHONE ENCOUNTER
Pt wanted Dr. Jc Hylton to know that he feels great after taking the predniSONE. He has been walking around and bought a cane, walker, and a shower chair so he can use when he needs it. He didn't know if a nurse or Dr. Jc Hylton wanted to follow up with him.

## 2022-12-02 ENCOUNTER — OFFICE VISIT (OUTPATIENT)
Dept: PHYSICAL THERAPY | Facility: HOSPITAL | Age: 82
End: 2022-12-02
Attending: INTERNAL MEDICINE
Payer: MEDICARE

## 2022-12-02 DIAGNOSIS — R29.898 WEAKNESS OF BOTH LEGS: ICD-10-CM

## 2022-12-02 PROCEDURE — 97110 THERAPEUTIC EXERCISES: CPT

## 2022-12-02 PROCEDURE — 97162 PT EVAL MOD COMPLEX 30 MIN: CPT

## 2022-12-06 ENCOUNTER — OFFICE VISIT (OUTPATIENT)
Dept: PHYSICAL THERAPY | Facility: HOSPITAL | Age: 82
End: 2022-12-06
Attending: INTERNAL MEDICINE
Payer: MEDICARE

## 2022-12-06 PROCEDURE — 97110 THERAPEUTIC EXERCISES: CPT | Performed by: PHYSICAL THERAPIST

## 2022-12-09 ENCOUNTER — OFFICE VISIT (OUTPATIENT)
Dept: PHYSICAL THERAPY | Facility: HOSPITAL | Age: 82
End: 2022-12-09
Attending: INTERNAL MEDICINE
Payer: MEDICARE

## 2022-12-09 PROCEDURE — 97110 THERAPEUTIC EXERCISES: CPT | Performed by: PHYSICAL THERAPIST

## 2022-12-12 ENCOUNTER — OFFICE VISIT (OUTPATIENT)
Dept: PHYSICAL THERAPY | Facility: HOSPITAL | Age: 82
End: 2022-12-12
Attending: INTERNAL MEDICINE
Payer: MEDICARE

## 2022-12-12 PROCEDURE — 97110 THERAPEUTIC EXERCISES: CPT | Performed by: PHYSICAL THERAPIST

## 2022-12-15 ENCOUNTER — OFFICE VISIT (OUTPATIENT)
Dept: PHYSICAL THERAPY | Facility: HOSPITAL | Age: 82
End: 2022-12-15
Attending: INTERNAL MEDICINE
Payer: MEDICARE

## 2022-12-15 PROCEDURE — 97110 THERAPEUTIC EXERCISES: CPT | Performed by: PHYSICAL THERAPIST

## 2022-12-19 ENCOUNTER — OFFICE VISIT (OUTPATIENT)
Dept: PHYSICAL THERAPY | Facility: HOSPITAL | Age: 82
End: 2022-12-19
Attending: INTERNAL MEDICINE
Payer: MEDICARE

## 2022-12-19 PROCEDURE — 97110 THERAPEUTIC EXERCISES: CPT | Performed by: PHYSICAL THERAPIST

## 2022-12-22 ENCOUNTER — APPOINTMENT (OUTPATIENT)
Dept: PHYSICAL THERAPY | Facility: HOSPITAL | Age: 82
End: 2022-12-22
Attending: INTERNAL MEDICINE
Payer: MEDICARE

## 2022-12-22 ENCOUNTER — TELEPHONE (OUTPATIENT)
Dept: PHYSICAL THERAPY | Facility: HOSPITAL | Age: 82
End: 2022-12-22

## 2023-01-13 DIAGNOSIS — I10 ESSENTIAL HYPERTENSION: ICD-10-CM

## 2023-01-13 RX ORDER — METOPROLOL SUCCINATE 50 MG/1
TABLET, EXTENDED RELEASE ORAL
Qty: 180 TABLET | Refills: 1 | Status: SHIPPED | OUTPATIENT
Start: 2023-01-13

## 2023-02-01 DIAGNOSIS — E78.2 MIXED HYPERLIPIDEMIA: ICD-10-CM

## 2023-02-01 DIAGNOSIS — I10 ESSENTIAL HYPERTENSION: ICD-10-CM

## 2023-02-01 DIAGNOSIS — I48.92 ATRIAL FLUTTER, PAROXYSMAL (HCC): ICD-10-CM

## 2023-02-01 DIAGNOSIS — Z85.828 PERSONAL HISTORY OF OTHER MALIGNANT NEOPLASM OF SKIN: ICD-10-CM

## 2023-02-01 DIAGNOSIS — I10 ESSENTIAL HYPERTENSION, BENIGN: ICD-10-CM

## 2023-02-01 RX ORDER — LISINOPRIL 20 MG/1
TABLET ORAL
Qty: 90 TABLET | Refills: 0 | Status: SHIPPED | OUTPATIENT
Start: 2023-02-01

## 2023-02-01 RX ORDER — ATORVASTATIN CALCIUM 10 MG/1
TABLET, FILM COATED ORAL
Qty: 90 TABLET | Refills: 0 | Status: SHIPPED | OUTPATIENT
Start: 2023-02-01

## 2023-03-01 ENCOUNTER — OFFICE VISIT (OUTPATIENT)
Dept: PODIATRY CLINIC | Facility: CLINIC | Age: 83
End: 2023-03-01

## 2023-03-01 DIAGNOSIS — B35.1 ONYCHOMYCOSIS: ICD-10-CM

## 2023-03-01 DIAGNOSIS — M20.41 HAMMER TOES OF BOTH FEET: ICD-10-CM

## 2023-03-01 DIAGNOSIS — M77.42 METATARSALGIA OF BOTH FEET: ICD-10-CM

## 2023-03-01 DIAGNOSIS — N18.30 STAGE 3 CHRONIC KIDNEY DISEASE, UNSPECIFIED WHETHER STAGE 3A OR 3B CKD (HCC): Primary | ICD-10-CM

## 2023-03-01 DIAGNOSIS — L84 FOOT CALLUS: ICD-10-CM

## 2023-03-01 DIAGNOSIS — M79.674 TOE PAIN, BILATERAL: ICD-10-CM

## 2023-03-01 DIAGNOSIS — M77.41 METATARSALGIA OF BOTH FEET: ICD-10-CM

## 2023-03-01 DIAGNOSIS — M20.42 HAMMER TOES OF BOTH FEET: ICD-10-CM

## 2023-03-01 DIAGNOSIS — M79.675 TOE PAIN, BILATERAL: ICD-10-CM

## 2023-03-01 PROCEDURE — 99203 OFFICE O/P NEW LOW 30 MIN: CPT | Performed by: STUDENT IN AN ORGANIZED HEALTH CARE EDUCATION/TRAINING PROGRAM

## 2023-03-01 PROCEDURE — 11721 DEBRIDE NAIL 6 OR MORE: CPT | Performed by: STUDENT IN AN ORGANIZED HEALTH CARE EDUCATION/TRAINING PROGRAM

## 2023-03-07 NOTE — PATIENT INSTRUCTIONS
-Dress small sore with Betadine and Band-Aid daily. Follow-up in 3 weeks to ensure site is healed or sooner if any other concerns arise.

## 2023-03-13 ENCOUNTER — LAB ENCOUNTER (OUTPATIENT)
Dept: LAB | Age: 83
End: 2023-03-13
Attending: INTERNAL MEDICINE
Payer: MEDICARE

## 2023-03-13 ENCOUNTER — OFFICE VISIT (OUTPATIENT)
Dept: INTERNAL MEDICINE CLINIC | Facility: CLINIC | Age: 83
End: 2023-03-13
Payer: MEDICARE

## 2023-03-13 VITALS
OXYGEN SATURATION: 97 % | DIASTOLIC BLOOD PRESSURE: 72 MMHG | TEMPERATURE: 98 F | HEART RATE: 59 BPM | WEIGHT: 196 LBS | HEIGHT: 74 IN | RESPIRATION RATE: 16 BRPM | BODY MASS INDEX: 25.15 KG/M2 | SYSTOLIC BLOOD PRESSURE: 130 MMHG

## 2023-03-13 DIAGNOSIS — R41.0 CONFUSION: Primary | ICD-10-CM

## 2023-03-13 DIAGNOSIS — R41.0 CONFUSION: ICD-10-CM

## 2023-03-13 LAB
ALBUMIN SERPL-MCNC: 3.2 G/DL (ref 3.4–5)
ALBUMIN/GLOB SERPL: 0.8 {RATIO} (ref 1–2)
ALP LIVER SERPL-CCNC: 66 U/L
ALT SERPL-CCNC: 21 U/L
ANION GAP SERPL CALC-SCNC: 7 MMOL/L (ref 0–18)
AST SERPL-CCNC: 15 U/L (ref 15–37)
BASOPHILS # BLD AUTO: 0.08 X10(3) UL (ref 0–0.2)
BASOPHILS NFR BLD AUTO: 0.8 %
BILIRUB SERPL-MCNC: 0.8 MG/DL (ref 0.1–2)
BILIRUB UR QL STRIP.AUTO: NEGATIVE
BUN BLD-MCNC: 16 MG/DL (ref 7–18)
CALCIUM BLD-MCNC: 9.5 MG/DL (ref 8.5–10.1)
CHLORIDE SERPL-SCNC: 108 MMOL/L (ref 98–112)
CLARITY UR REFRACT.AUTO: CLEAR
CO2 SERPL-SCNC: 27 MMOL/L (ref 21–32)
COLOR UR AUTO: YELLOW
CREAT BLD-MCNC: 1.24 MG/DL
EOSINOPHIL # BLD AUTO: 0.07 X10(3) UL (ref 0–0.7)
EOSINOPHIL NFR BLD AUTO: 0.7 %
ERYTHROCYTE [DISTWIDTH] IN BLOOD BY AUTOMATED COUNT: 12.9 %
FASTING STATUS PATIENT QL REPORTED: NO
GFR SERPLBLD BASED ON 1.73 SQ M-ARVRAT: 57 ML/MIN/1.73M2 (ref 60–?)
GLOBULIN PLAS-MCNC: 3.8 G/DL (ref 2.8–4.4)
GLUCOSE BLD-MCNC: 119 MG/DL (ref 70–99)
GLUCOSE UR STRIP.AUTO-MCNC: NEGATIVE MG/DL
HCT VFR BLD AUTO: 41.8 %
HGB BLD-MCNC: 13.5 G/DL
IMM GRANULOCYTES # BLD AUTO: 0.02 X10(3) UL (ref 0–1)
IMM GRANULOCYTES NFR BLD: 0.2 %
KETONES UR STRIP.AUTO-MCNC: NEGATIVE MG/DL
LYMPHOCYTES # BLD AUTO: 2.42 X10(3) UL (ref 1–4)
LYMPHOCYTES NFR BLD AUTO: 25.5 %
MCH RBC QN AUTO: 29.3 PG (ref 26–34)
MCHC RBC AUTO-ENTMCNC: 32.3 G/DL (ref 31–37)
MCV RBC AUTO: 90.9 FL
MONOCYTES # BLD AUTO: 0.92 X10(3) UL (ref 0.1–1)
MONOCYTES NFR BLD AUTO: 9.7 %
NEUTROPHILS # BLD AUTO: 5.98 X10 (3) UL (ref 1.5–7.7)
NEUTROPHILS # BLD AUTO: 5.98 X10(3) UL (ref 1.5–7.7)
NEUTROPHILS NFR BLD AUTO: 63.1 %
NITRITE UR QL STRIP.AUTO: NEGATIVE
OSMOLALITY SERPL CALC.SUM OF ELEC: 296 MOSM/KG (ref 275–295)
PH UR STRIP.AUTO: 5 [PH] (ref 5–8)
PLATELET # BLD AUTO: 226 10(3)UL (ref 150–450)
POTASSIUM SERPL-SCNC: 4.3 MMOL/L (ref 3.5–5.1)
PROT SERPL-MCNC: 7 G/DL (ref 6.4–8.2)
PROT UR STRIP.AUTO-MCNC: NEGATIVE MG/DL
RBC # BLD AUTO: 4.6 X10(6)UL
RBC UR QL AUTO: NEGATIVE
SODIUM SERPL-SCNC: 142 MMOL/L (ref 136–145)
SP GR UR STRIP.AUTO: 1.02 (ref 1–1.03)
UROBILINOGEN UR STRIP.AUTO-MCNC: <2 MG/DL
WBC # BLD AUTO: 9.5 X10(3) UL (ref 4–11)

## 2023-03-13 PROCEDURE — 87086 URINE CULTURE/COLONY COUNT: CPT

## 2023-03-13 PROCEDURE — 36415 COLL VENOUS BLD VENIPUNCTURE: CPT

## 2023-03-13 PROCEDURE — 80053 COMPREHEN METABOLIC PANEL: CPT

## 2023-03-13 PROCEDURE — 85025 COMPLETE CBC W/AUTO DIFF WBC: CPT

## 2023-03-13 PROCEDURE — 1126F AMNT PAIN NOTED NONE PRSNT: CPT | Performed by: INTERNAL MEDICINE

## 2023-03-13 PROCEDURE — 81001 URINALYSIS AUTO W/SCOPE: CPT

## 2023-03-13 PROCEDURE — 99214 OFFICE O/P EST MOD 30 MIN: CPT | Performed by: INTERNAL MEDICINE

## 2023-03-15 DIAGNOSIS — R41.0 CONFUSION: Primary | ICD-10-CM

## 2023-03-16 ENCOUNTER — HOSPITAL ENCOUNTER (OUTPATIENT)
Dept: GENERAL RADIOLOGY | Facility: HOSPITAL | Age: 83
Discharge: HOME | End: 2023-03-16
Attending: INTERNAL MEDICINE
Payer: MEDICARE

## 2023-03-16 ENCOUNTER — HOSPITAL ENCOUNTER (OUTPATIENT)
Dept: MRI IMAGING | Facility: HOSPITAL | Age: 83
Discharge: HOME | End: 2023-03-16
Attending: INTERNAL MEDICINE
Payer: MEDICARE

## 2023-03-16 DIAGNOSIS — R41.0 CONFUSION: ICD-10-CM

## 2023-03-16 PROCEDURE — A9575 INJ GADOTERATE MEGLUMI 0.1ML: HCPCS | Performed by: INTERNAL MEDICINE

## 2023-03-16 PROCEDURE — 71045 X-RAY EXAM CHEST 1 VIEW: CPT | Performed by: INTERNAL MEDICINE

## 2023-03-16 PROCEDURE — 70553 MRI BRAIN STEM W/O & W/DYE: CPT | Performed by: INTERNAL MEDICINE

## 2023-03-16 RX ORDER — GADOTERATE MEGLUMINE 376.9 MG/ML
20 INJECTION INTRAVENOUS
Status: COMPLETED | OUTPATIENT
Start: 2023-03-16 | End: 2023-03-16

## 2023-03-16 RX ADMIN — GADOTERATE MEGLUMINE 20 ML: 376.9 INJECTION INTRAVENOUS at 12:45:00

## 2023-03-18 ENCOUNTER — HOSPITAL ENCOUNTER (EMERGENCY)
Facility: HOSPITAL | Age: 83
Discharge: HOME | End: 2023-03-18
Attending: EMERGENCY MEDICINE
Payer: MEDICARE

## 2023-03-18 ENCOUNTER — APPOINTMENT (OUTPATIENT)
Dept: CT IMAGING | Facility: HOSPITAL | Age: 83
End: 2023-03-18
Attending: EMERGENCY MEDICINE
Payer: MEDICARE

## 2023-03-18 VITALS
WEIGHT: 198 LBS | BODY MASS INDEX: 25.41 KG/M2 | DIASTOLIC BLOOD PRESSURE: 64 MMHG | TEMPERATURE: 97 F | HEART RATE: 52 BPM | SYSTOLIC BLOOD PRESSURE: 168 MMHG | HEIGHT: 74 IN | RESPIRATION RATE: 14 BRPM | OXYGEN SATURATION: 98 %

## 2023-03-18 DIAGNOSIS — S00.01XA ABRASION OF SCALP, INITIAL ENCOUNTER: Primary | ICD-10-CM

## 2023-03-18 PROCEDURE — 70450 CT HEAD/BRAIN W/O DYE: CPT | Performed by: EMERGENCY MEDICINE

## 2023-03-18 PROCEDURE — 99284 EMERGENCY DEPT VISIT MOD MDM: CPT

## 2023-03-18 PROCEDURE — 93005 ELECTROCARDIOGRAM TRACING: CPT

## 2023-03-18 PROCEDURE — 93010 ELECTROCARDIOGRAM REPORT: CPT

## 2023-03-18 NOTE — ED INITIAL ASSESSMENT (HPI)
Pt states he missed the chair when sitting down. Denies dizziness. Pt fell and hit floor on his walker. Small lac to right temporal and posterior head. Pt arrived in c collar. Pt denies pain. Pt a/ox4.     Pt on xaralto

## 2023-03-19 LAB
ATRIAL RATE: 256 BPM
P AXIS: 96 DEGREES
Q-T INTERVAL: 438 MS
QRS DURATION: 94 MS
QTC CALCULATION (BEZET): 403 MS
R AXIS: 6 DEGREES
T AXIS: -2 DEGREES
VENTRICULAR RATE: 51 BPM

## 2023-03-19 NOTE — ED QUICK NOTES
Assuming pt care, report received from [facility]. Plan of Care reviewed. Bed is locked and in lowest position. Call light within reach.

## 2023-03-24 ENCOUNTER — OFFICE VISIT (OUTPATIENT)
Dept: NEUROLOGY | Facility: CLINIC | Age: 83
End: 2023-03-24
Payer: MEDICARE

## 2023-03-24 VITALS
DIASTOLIC BLOOD PRESSURE: 54 MMHG | BODY MASS INDEX: 25 KG/M2 | RESPIRATION RATE: 16 BRPM | WEIGHT: 198 LBS | SYSTOLIC BLOOD PRESSURE: 120 MMHG | HEART RATE: 54 BPM

## 2023-03-24 DIAGNOSIS — R26.9 GAIT DISORDER: ICD-10-CM

## 2023-03-24 DIAGNOSIS — R41.0 CONFUSION: ICD-10-CM

## 2023-03-24 DIAGNOSIS — R41.3 MEMORY LOSS: Primary | ICD-10-CM

## 2023-03-24 PROCEDURE — 99204 OFFICE O/P NEW MOD 45 MIN: CPT | Performed by: OTHER

## 2023-03-24 NOTE — PROGRESS NOTES
Pt states here for memory loss. Pt states has also had trouble walking. Pt states having pain in both legs and lower back. Pt has fallen several times due to imbalance.

## 2023-03-27 ENCOUNTER — OFFICE VISIT (OUTPATIENT)
Dept: INTERNAL MEDICINE CLINIC | Facility: CLINIC | Age: 83
End: 2023-03-27
Payer: MEDICARE

## 2023-03-27 ENCOUNTER — LAB ENCOUNTER (OUTPATIENT)
Dept: LAB | Age: 83
End: 2023-03-27
Attending: INTERNAL MEDICINE
Payer: MEDICARE

## 2023-03-27 VITALS
WEIGHT: 198 LBS | SYSTOLIC BLOOD PRESSURE: 124 MMHG | HEART RATE: 72 BPM | OXYGEN SATURATION: 98 % | DIASTOLIC BLOOD PRESSURE: 84 MMHG | HEIGHT: 74 IN | RESPIRATION RATE: 16 BRPM | BODY MASS INDEX: 25.41 KG/M2

## 2023-03-27 DIAGNOSIS — R26.9 GAIT DISORDER: ICD-10-CM

## 2023-03-27 DIAGNOSIS — R41.3 MEMORY LOSS: ICD-10-CM

## 2023-03-27 DIAGNOSIS — R29.898 MUSCULAR DECONDITIONING: ICD-10-CM

## 2023-03-27 DIAGNOSIS — R41.0 CONFUSION: ICD-10-CM

## 2023-03-27 DIAGNOSIS — R41.3 MEMORY LOSS: Primary | ICD-10-CM

## 2023-03-27 LAB
T4 FREE SERPL-MCNC: 1 NG/DL (ref 0.8–1.7)
TSI SER-ACNC: 0.94 MIU/ML (ref 0.36–3.74)
VIT B12 SERPL-MCNC: 390 PG/ML (ref 193–986)

## 2023-03-27 PROCEDURE — 84439 ASSAY OF FREE THYROXINE: CPT

## 2023-03-27 PROCEDURE — 99213 OFFICE O/P EST LOW 20 MIN: CPT | Performed by: INTERNAL MEDICINE

## 2023-03-27 PROCEDURE — 84443 ASSAY THYROID STIM HORMONE: CPT

## 2023-03-27 PROCEDURE — 82607 VITAMIN B-12: CPT

## 2023-03-27 PROCEDURE — 82746 ASSAY OF FOLIC ACID SERUM: CPT

## 2023-03-27 PROCEDURE — 36415 COLL VENOUS BLD VENIPUNCTURE: CPT

## 2023-03-28 LAB — FOLATE SERPL-MCNC: 10.2 NG/ML (ref 8.7–?)

## 2023-03-30 ENCOUNTER — NURSE ONLY (OUTPATIENT)
Dept: ELECTROPHYSIOLOGY | Facility: HOSPITAL | Age: 83
End: 2023-03-30
Attending: Other
Payer: MEDICARE

## 2023-03-30 DIAGNOSIS — R41.0 CONFUSION: ICD-10-CM

## 2023-03-30 DIAGNOSIS — R41.3 MEMORY LOSS: ICD-10-CM

## 2023-03-30 DIAGNOSIS — R26.9 GAIT DISORDER: ICD-10-CM

## 2023-03-30 PROCEDURE — 95816 EEG AWAKE AND DROWSY: CPT | Performed by: OTHER

## 2023-03-30 NOTE — PROCEDURES
Date of Procedure: 3/30/2023    Procedure: EEG (ELECTROENCEPHALOGRAM)     DIAGNOSIS: MEMORY LOSS  HISTORY: AN 78YEAR OLD MALE PRESENTS FOR EVALUATION OF MEMORY LOSS, CONFUSION AND GAIT DISORDER. PATIENT STATES THAT HE HAS BEEN GETTING CONFUSED AND FORGETFUL OVER THE LAST 3 WEEKS. PATIENT IS ALSO COMPLAINING OF TROUBLE WALKING. WIFE STATES THAT HE WILL HAVE EPISODES WHERE HE TALKS NONSENSE,  ASKS STRANGE QUESTIONS AND HAS SOME HALLUCINATIONS. PT HAS HAD SOME TROUBLE WALKING AND HAS FALLEN SEVERAL TIMES DUE TO IMBALANCE. NO REPORTS OF ABNORMAL MOVEMENTS, LOC, HEADACHES OR OTHER RECENT ILLNESS.  WIFE DID STATE THAT THESE EPISODES BEGAN AFTER THE PATIENT STOPPED TAKING A STEROID MEDICATION. NO HISTORY OF SEIZURE. PAST MEDICAL HISTORY: SYNCOPE, ATRIAL FLUTTER, CANCER, HYPERTENSION, NEPHTROTIC SYNDROME, RENAL DISORDER    MEDICATIONS: ATORVASTATIN, METOPROLOL, HYDRALAZINE, XARELTO     PREVIOUS EE20 NORMAL WITH NO EVIDENCE OF FOCAL SLOWING OR SEIZURE ACTIVITY NOTED    BACKGROUND ACTIVITY: Posterior rhythm was in the range of 5-7 Hz, reactive to eye opening; symmetrical and synchronous. Noted also are generalized continuous slowing. EPILEPTIFORM DISCHARGES: There were no epileptiform discharges or periodic lateralized epileptiform discharges (PLEDs) recorded throughout the recording. HYPERVENTILATION: Hyperventilation was not performed. PHOTIC STIMULATION: Photic stimulation was not performed. Stage II sleep was not reached. IMPRESSION: There is continuous generalized slowing; However, no electroclinical seizure or epileptiform discharges were captured. Clinical correlation is advised.     Paul Edge MD   Neurology  [facility]  3/30/2023, 1:09 PM  CC: Ward Pérez MD

## 2023-05-01 DIAGNOSIS — I48.92 ATRIAL FLUTTER, PAROXYSMAL (HCC): ICD-10-CM

## 2023-05-01 DIAGNOSIS — E78.2 MIXED HYPERLIPIDEMIA: ICD-10-CM

## 2023-05-01 DIAGNOSIS — I10 ESSENTIAL HYPERTENSION: ICD-10-CM

## 2023-05-01 DIAGNOSIS — I10 ESSENTIAL HYPERTENSION, BENIGN: ICD-10-CM

## 2023-05-01 DIAGNOSIS — Z85.828 PERSONAL HISTORY OF OTHER MALIGNANT NEOPLASM OF SKIN: ICD-10-CM

## 2023-05-01 RX ORDER — LISINOPRIL 20 MG/1
TABLET ORAL
Qty: 90 TABLET | Refills: 0 | OUTPATIENT
Start: 2023-05-01

## 2023-05-01 RX ORDER — ATORVASTATIN CALCIUM 10 MG/1
TABLET, FILM COATED ORAL
Qty: 90 TABLET | Refills: 1 | Status: SHIPPED | OUTPATIENT
Start: 2023-05-01

## 2023-05-04 ENCOUNTER — OFFICE VISIT (OUTPATIENT)
Dept: PHYSICAL THERAPY | Facility: HOSPITAL | Age: 83
End: 2023-05-04
Attending: INTERNAL MEDICINE
Payer: MEDICARE

## 2023-05-04 DIAGNOSIS — R29.898 MUSCULAR DECONDITIONING: ICD-10-CM

## 2023-05-04 PROCEDURE — 97162 PT EVAL MOD COMPLEX 30 MIN: CPT

## 2023-05-04 PROCEDURE — 97110 THERAPEUTIC EXERCISES: CPT

## 2023-05-08 ENCOUNTER — OFFICE VISIT (OUTPATIENT)
Dept: PHYSICAL THERAPY | Facility: HOSPITAL | Age: 83
End: 2023-05-08
Attending: INTERNAL MEDICINE
Payer: MEDICARE

## 2023-05-08 PROCEDURE — 97110 THERAPEUTIC EXERCISES: CPT

## 2023-05-08 PROCEDURE — 97112 NEUROMUSCULAR REEDUCATION: CPT

## 2023-05-10 ENCOUNTER — OFFICE VISIT (OUTPATIENT)
Dept: PHYSICAL THERAPY | Facility: HOSPITAL | Age: 83
End: 2023-05-10
Attending: INTERNAL MEDICINE
Payer: MEDICARE

## 2023-05-10 PROCEDURE — 97110 THERAPEUTIC EXERCISES: CPT

## 2023-05-10 PROCEDURE — 97112 NEUROMUSCULAR REEDUCATION: CPT

## 2023-05-15 ENCOUNTER — OFFICE VISIT (OUTPATIENT)
Dept: PHYSICAL THERAPY | Facility: HOSPITAL | Age: 83
End: 2023-05-15
Attending: INTERNAL MEDICINE
Payer: MEDICARE

## 2023-05-15 ENCOUNTER — TELEPHONE (OUTPATIENT)
Dept: PHYSICAL THERAPY | Facility: HOSPITAL | Age: 83
End: 2023-05-15

## 2023-05-15 PROCEDURE — 97112 NEUROMUSCULAR REEDUCATION: CPT

## 2023-05-15 PROCEDURE — 97110 THERAPEUTIC EXERCISES: CPT

## 2023-05-17 ENCOUNTER — OFFICE VISIT (OUTPATIENT)
Dept: PHYSICAL THERAPY | Facility: HOSPITAL | Age: 83
End: 2023-05-17
Attending: INTERNAL MEDICINE
Payer: MEDICARE

## 2023-05-17 PROCEDURE — 97530 THERAPEUTIC ACTIVITIES: CPT

## 2023-05-17 PROCEDURE — 97112 NEUROMUSCULAR REEDUCATION: CPT

## 2023-05-17 PROCEDURE — 97110 THERAPEUTIC EXERCISES: CPT

## 2023-05-18 ENCOUNTER — OFFICE VISIT (OUTPATIENT)
Dept: INTERNAL MEDICINE CLINIC | Facility: CLINIC | Age: 83
End: 2023-05-18
Payer: MEDICARE

## 2023-05-18 VITALS
BODY MASS INDEX: 25.41 KG/M2 | SYSTOLIC BLOOD PRESSURE: 138 MMHG | WEIGHT: 198 LBS | TEMPERATURE: 98 F | OXYGEN SATURATION: 99 % | DIASTOLIC BLOOD PRESSURE: 80 MMHG | HEART RATE: 76 BPM | RESPIRATION RATE: 16 BRPM | HEIGHT: 74 IN

## 2023-05-18 DIAGNOSIS — E78.2 MIXED HYPERLIPIDEMIA: ICD-10-CM

## 2023-05-18 DIAGNOSIS — I48.92 ATRIAL FLUTTER, PAROXYSMAL (HCC): ICD-10-CM

## 2023-05-18 DIAGNOSIS — I10 BENIGN ESSENTIAL HTN: Primary | ICD-10-CM

## 2023-05-18 PROCEDURE — 1125F AMNT PAIN NOTED PAIN PRSNT: CPT | Performed by: INTERNAL MEDICINE

## 2023-05-18 PROCEDURE — 99214 OFFICE O/P EST MOD 30 MIN: CPT | Performed by: INTERNAL MEDICINE

## 2023-05-23 ENCOUNTER — OFFICE VISIT (OUTPATIENT)
Dept: PHYSICAL THERAPY | Facility: HOSPITAL | Age: 83
End: 2023-05-23
Attending: INTERNAL MEDICINE
Payer: MEDICARE

## 2023-05-23 PROCEDURE — 97110 THERAPEUTIC EXERCISES: CPT

## 2023-05-23 PROCEDURE — 97116 GAIT TRAINING THERAPY: CPT

## 2023-05-25 ENCOUNTER — OFFICE VISIT (OUTPATIENT)
Dept: PHYSICAL THERAPY | Facility: HOSPITAL | Age: 83
End: 2023-05-25
Attending: INTERNAL MEDICINE
Payer: MEDICARE

## 2023-05-25 PROCEDURE — 97116 GAIT TRAINING THERAPY: CPT

## 2023-05-25 PROCEDURE — 97110 THERAPEUTIC EXERCISES: CPT

## 2023-05-30 ENCOUNTER — OFFICE VISIT (OUTPATIENT)
Dept: PHYSICAL THERAPY | Facility: HOSPITAL | Age: 83
End: 2023-05-30
Attending: INTERNAL MEDICINE
Payer: MEDICARE

## 2023-05-30 PROCEDURE — 97110 THERAPEUTIC EXERCISES: CPT

## 2023-05-30 PROCEDURE — 97112 NEUROMUSCULAR REEDUCATION: CPT

## 2023-06-01 ENCOUNTER — OFFICE VISIT (OUTPATIENT)
Dept: PHYSICAL THERAPY | Facility: HOSPITAL | Age: 83
End: 2023-06-01
Attending: INTERNAL MEDICINE
Payer: MEDICARE

## 2023-06-01 PROCEDURE — 97110 THERAPEUTIC EXERCISES: CPT

## 2023-06-01 PROCEDURE — 97112 NEUROMUSCULAR REEDUCATION: CPT

## 2023-06-06 ENCOUNTER — OFFICE VISIT (OUTPATIENT)
Dept: PHYSICAL THERAPY | Facility: HOSPITAL | Age: 83
End: 2023-06-06
Attending: INTERNAL MEDICINE
Payer: MEDICARE

## 2023-06-06 PROCEDURE — 97112 NEUROMUSCULAR REEDUCATION: CPT

## 2023-06-06 PROCEDURE — 97110 THERAPEUTIC EXERCISES: CPT

## 2023-06-08 ENCOUNTER — APPOINTMENT (OUTPATIENT)
Dept: PHYSICAL THERAPY | Facility: HOSPITAL | Age: 83
End: 2023-06-08
Attending: INTERNAL MEDICINE
Payer: MEDICARE

## 2023-06-13 ENCOUNTER — OFFICE VISIT (OUTPATIENT)
Dept: PHYSICAL THERAPY | Facility: HOSPITAL | Age: 83
End: 2023-06-13
Attending: INTERNAL MEDICINE
Payer: MEDICARE

## 2023-06-13 PROCEDURE — 97112 NEUROMUSCULAR REEDUCATION: CPT

## 2023-06-13 PROCEDURE — 97110 THERAPEUTIC EXERCISES: CPT

## 2023-06-14 ENCOUNTER — OFFICE VISIT (OUTPATIENT)
Dept: PODIATRY CLINIC | Facility: CLINIC | Age: 83
End: 2023-06-14

## 2023-06-14 DIAGNOSIS — L84 FOOT CALLUS: ICD-10-CM

## 2023-06-14 DIAGNOSIS — N18.30 STAGE 3 CHRONIC KIDNEY DISEASE, UNSPECIFIED WHETHER STAGE 3A OR 3B CKD (HCC): Primary | ICD-10-CM

## 2023-06-14 DIAGNOSIS — M20.42 HAMMER TOES OF BOTH FEET: ICD-10-CM

## 2023-06-14 DIAGNOSIS — M79.674 TOE PAIN, BILATERAL: ICD-10-CM

## 2023-06-14 DIAGNOSIS — M77.42 METATARSALGIA OF BOTH FEET: ICD-10-CM

## 2023-06-14 DIAGNOSIS — M77.41 METATARSALGIA OF BOTH FEET: ICD-10-CM

## 2023-06-14 DIAGNOSIS — M79.675 TOE PAIN, BILATERAL: ICD-10-CM

## 2023-06-14 DIAGNOSIS — B35.1 ONYCHOMYCOSIS: ICD-10-CM

## 2023-06-14 DIAGNOSIS — M20.41 HAMMER TOES OF BOTH FEET: ICD-10-CM

## 2023-06-14 PROCEDURE — 99213 OFFICE O/P EST LOW 20 MIN: CPT | Performed by: STUDENT IN AN ORGANIZED HEALTH CARE EDUCATION/TRAINING PROGRAM

## 2023-06-15 ENCOUNTER — APPOINTMENT (OUTPATIENT)
Dept: PHYSICAL THERAPY | Facility: HOSPITAL | Age: 83
End: 2023-06-15
Attending: INTERNAL MEDICINE
Payer: MEDICARE

## 2023-06-15 NOTE — PATIENT INSTRUCTIONS
Ambulate with supportive shoes and avoid walking barefoot. Apply urea cream to callus sites. Follow-up in 2 months for reevaluation or sooner if other concerns arise.

## 2023-06-20 ENCOUNTER — OFFICE VISIT (OUTPATIENT)
Dept: PHYSICAL THERAPY | Facility: HOSPITAL | Age: 83
End: 2023-06-20
Attending: INTERNAL MEDICINE
Payer: MEDICARE

## 2023-06-20 PROCEDURE — 97112 NEUROMUSCULAR REEDUCATION: CPT

## 2023-06-20 PROCEDURE — 97110 THERAPEUTIC EXERCISES: CPT

## 2023-06-21 ENCOUNTER — APPOINTMENT (OUTPATIENT)
Dept: CT IMAGING | Facility: HOSPITAL | Age: 83
End: 2023-06-21
Attending: EMERGENCY MEDICINE
Payer: MEDICARE

## 2023-06-21 ENCOUNTER — APPOINTMENT (OUTPATIENT)
Dept: GENERAL RADIOLOGY | Facility: HOSPITAL | Age: 83
End: 2023-06-21
Attending: EMERGENCY MEDICINE
Payer: MEDICARE

## 2023-06-21 ENCOUNTER — HOSPITAL ENCOUNTER (EMERGENCY)
Facility: HOSPITAL | Age: 83
Discharge: HOME | End: 2023-06-21
Attending: EMERGENCY MEDICINE
Payer: MEDICARE

## 2023-06-21 ENCOUNTER — PATIENT OUTREACH (OUTPATIENT)
Dept: CASE MANAGEMENT | Age: 83
End: 2023-06-21

## 2023-06-21 VITALS
HEART RATE: 88 BPM | SYSTOLIC BLOOD PRESSURE: 161 MMHG | OXYGEN SATURATION: 100 % | RESPIRATION RATE: 16 BRPM | BODY MASS INDEX: 25.41 KG/M2 | HEIGHT: 74 IN | DIASTOLIC BLOOD PRESSURE: 84 MMHG | WEIGHT: 198 LBS | TEMPERATURE: 98 F

## 2023-06-21 DIAGNOSIS — R58 ECCHYMOSIS: ICD-10-CM

## 2023-06-21 DIAGNOSIS — S70.02XA CONTUSION OF LEFT HIP, INITIAL ENCOUNTER: Primary | ICD-10-CM

## 2023-06-21 LAB
ALBUMIN SERPL-MCNC: 3 G/DL (ref 3.4–5)
ALBUMIN/GLOB SERPL: 0.9 {RATIO} (ref 1–2)
ALP LIVER SERPL-CCNC: 86 U/L
ALT SERPL-CCNC: 27 U/L
ANION GAP SERPL CALC-SCNC: 4 MMOL/L (ref 0–18)
APTT PPP: 42.9 SECONDS (ref 23.3–35.6)
AST SERPL-CCNC: 21 U/L (ref 15–37)
BASOPHILS # BLD AUTO: 0.09 X10(3) UL (ref 0–0.2)
BASOPHILS NFR BLD AUTO: 0.7 %
BILIRUB SERPL-MCNC: 0.6 MG/DL (ref 0.1–2)
BUN BLD-MCNC: 26 MG/DL (ref 7–18)
CALCIUM BLD-MCNC: 8.8 MG/DL (ref 8.5–10.1)
CHLORIDE SERPL-SCNC: 109 MMOL/L (ref 98–112)
CO2 SERPL-SCNC: 27 MMOL/L (ref 21–32)
CREAT BLD-MCNC: 1.36 MG/DL
EOSINOPHIL # BLD AUTO: 0.22 X10(3) UL (ref 0–0.7)
EOSINOPHIL NFR BLD AUTO: 1.7 %
ERYTHROCYTE [DISTWIDTH] IN BLOOD BY AUTOMATED COUNT: 14.8 %
GFR SERPLBLD BASED ON 1.73 SQ M-ARVRAT: 51 ML/MIN/1.73M2 (ref 60–?)
GLOBULIN PLAS-MCNC: 3.4 G/DL (ref 2.8–4.4)
GLUCOSE BLD-MCNC: 104 MG/DL (ref 70–99)
HCT VFR BLD AUTO: 36.2 %
HGB BLD-MCNC: 11.5 G/DL
IMM GRANULOCYTES # BLD AUTO: 0.09 X10(3) UL (ref 0–1)
IMM GRANULOCYTES NFR BLD: 0.7 %
INR BLD: 2.32 (ref 0.85–1.16)
LYMPHOCYTES # BLD AUTO: 2.26 X10(3) UL (ref 1–4)
LYMPHOCYTES NFR BLD AUTO: 17.5 %
MCH RBC QN AUTO: 28.2 PG (ref 26–34)
MCHC RBC AUTO-ENTMCNC: 31.8 G/DL (ref 31–37)
MCV RBC AUTO: 88.7 FL
MONOCYTES # BLD AUTO: 1.32 X10(3) UL (ref 0.1–1)
MONOCYTES NFR BLD AUTO: 10.2 %
NEUTROPHILS # BLD AUTO: 8.94 X10 (3) UL (ref 1.5–7.7)
NEUTROPHILS # BLD AUTO: 8.94 X10(3) UL (ref 1.5–7.7)
NEUTROPHILS NFR BLD AUTO: 69.2 %
OSMOLALITY SERPL CALC.SUM OF ELEC: 295 MOSM/KG (ref 275–295)
PLATELET # BLD AUTO: 240 10(3)UL (ref 150–450)
POTASSIUM SERPL-SCNC: 4.5 MMOL/L (ref 3.5–5.1)
PROT SERPL-MCNC: 6.4 G/DL (ref 6.4–8.2)
PROTHROMBIN TIME: 25.2 SECONDS (ref 11.6–14.8)
RBC # BLD AUTO: 4.08 X10(6)UL
SODIUM SERPL-SCNC: 140 MMOL/L (ref 136–145)
WBC # BLD AUTO: 12.9 X10(3) UL (ref 4–11)

## 2023-06-21 PROCEDURE — 73700 CT LOWER EXTREMITY W/O DYE: CPT | Performed by: EMERGENCY MEDICINE

## 2023-06-21 PROCEDURE — 99285 EMERGENCY DEPT VISIT HI MDM: CPT

## 2023-06-21 PROCEDURE — 73502 X-RAY EXAM HIP UNI 2-3 VIEWS: CPT | Performed by: EMERGENCY MEDICINE

## 2023-06-21 PROCEDURE — 85025 COMPLETE CBC W/AUTO DIFF WBC: CPT | Performed by: EMERGENCY MEDICINE

## 2023-06-21 PROCEDURE — 85730 THROMBOPLASTIN TIME PARTIAL: CPT | Performed by: EMERGENCY MEDICINE

## 2023-06-21 PROCEDURE — 85610 PROTHROMBIN TIME: CPT | Performed by: EMERGENCY MEDICINE

## 2023-06-21 PROCEDURE — 80053 COMPREHEN METABOLIC PANEL: CPT | Performed by: EMERGENCY MEDICINE

## 2023-06-21 PROCEDURE — 71045 X-RAY EXAM CHEST 1 VIEW: CPT | Performed by: EMERGENCY MEDICINE

## 2023-06-21 PROCEDURE — 99284 EMERGENCY DEPT VISIT MOD MDM: CPT

## 2023-06-21 PROCEDURE — 96374 THER/PROPH/DIAG INJ IV PUSH: CPT

## 2023-06-21 PROCEDURE — 96375 TX/PRO/DX INJ NEW DRUG ADDON: CPT

## 2023-06-21 RX ORDER — MORPHINE SULFATE 4 MG/ML
4 INJECTION, SOLUTION INTRAMUSCULAR; INTRAVENOUS ONCE
Status: COMPLETED | OUTPATIENT
Start: 2023-06-21 | End: 2023-06-21

## 2023-06-21 RX ORDER — ONDANSETRON 2 MG/ML
4 INJECTION INTRAMUSCULAR; INTRAVENOUS ONCE
Status: COMPLETED | OUTPATIENT
Start: 2023-06-21 | End: 2023-06-21

## 2023-06-21 NOTE — ED INITIAL ASSESSMENT (HPI)
Pt arrived via ems, called for fall. Pt reports pain to left upper leg after fall. Thinks he slipped when getting up from chair. Able to shower after fall, pain worsening. Denies injury to head, neck or back.

## 2023-06-23 ENCOUNTER — APPOINTMENT (OUTPATIENT)
Dept: PHYSICAL THERAPY | Facility: HOSPITAL | Age: 83
End: 2023-06-23
Attending: INTERNAL MEDICINE
Payer: MEDICARE

## 2023-06-23 ENCOUNTER — OFFICE VISIT (OUTPATIENT)
Dept: INTERNAL MEDICINE CLINIC | Facility: CLINIC | Age: 83
End: 2023-06-23
Payer: MEDICARE

## 2023-06-23 VITALS
RESPIRATION RATE: 18 BRPM | DIASTOLIC BLOOD PRESSURE: 68 MMHG | HEART RATE: 81 BPM | SYSTOLIC BLOOD PRESSURE: 118 MMHG | HEIGHT: 74 IN | BODY MASS INDEX: 25 KG/M2 | TEMPERATURE: 99 F | OXYGEN SATURATION: 99 %
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
## 2023-06-23 DIAGNOSIS — Z91.81 AT HIGH RISK FOR FALLS: ICD-10-CM

## 2023-06-23 DIAGNOSIS — G47.10 HYPERSOMNOLENCE: ICD-10-CM

## 2023-06-23 PROCEDURE — 99214 OFFICE O/P EST MOD 30 MIN: CPT | Performed by: INTERNAL MEDICINE

## 2023-07-16 DIAGNOSIS — I10 ESSENTIAL HYPERTENSION: ICD-10-CM

## 2023-07-17 RX ORDER — METOPROLOL SUCCINATE 50 MG/1
TABLET, EXTENDED RELEASE ORAL
Qty: 180 TABLET | Refills: 1 | Status: SHIPPED | OUTPATIENT
Start: 2023-07-17

## 2023-07-17 RX ORDER — HYDRALAZINE HYDROCHLORIDE 100 MG/1
TABLET, FILM COATED ORAL
Qty: 180 TABLET | Refills: 1 | Status: SHIPPED | OUTPATIENT
Start: 2023-07-17

## 2023-08-16 ENCOUNTER — OFFICE VISIT (OUTPATIENT)
Dept: PODIATRY CLINIC | Facility: CLINIC | Age: 83
End: 2023-08-16

## 2023-08-16 DIAGNOSIS — N18.30 STAGE 3 CHRONIC KIDNEY DISEASE, UNSPECIFIED WHETHER STAGE 3A OR 3B CKD (HCC): ICD-10-CM

## 2023-08-16 DIAGNOSIS — M77.42 METATARSALGIA OF BOTH FEET: ICD-10-CM

## 2023-08-16 DIAGNOSIS — M77.41 METATARSALGIA OF BOTH FEET: ICD-10-CM

## 2023-08-16 DIAGNOSIS — M20.41 HAMMER TOES OF BOTH FEET: ICD-10-CM

## 2023-08-16 DIAGNOSIS — M20.42 HAMMER TOES OF BOTH FEET: ICD-10-CM

## 2023-08-16 DIAGNOSIS — B35.1 ONYCHOMYCOSIS: Primary | ICD-10-CM

## 2023-08-16 DIAGNOSIS — M79.674 TOE PAIN, BILATERAL: ICD-10-CM

## 2023-08-16 DIAGNOSIS — L84 PRE-ULCERATIVE CALLUSES: ICD-10-CM

## 2023-08-16 DIAGNOSIS — M79.675 TOE PAIN, BILATERAL: ICD-10-CM

## 2023-08-16 PROCEDURE — 99213 OFFICE O/P EST LOW 20 MIN: CPT | Performed by: STUDENT IN AN ORGANIZED HEALTH CARE EDUCATION/TRAINING PROGRAM

## 2023-08-17 NOTE — PROGRESS NOTES
[facility] Podiatry  Progress Note    Peg Recinos is a 78year old male. No chief complaint on file. HPI:     Patient is a pleasant 40-year-old male with past medical history of CKD who presents to clinic for foot exam.  He has elongated nails and thickened calluses he has difficulty trimming on his own. He also has preulcerative calluses to his plantar feet that start to cause pain. He has been inconsistently using felt offloading pads to sites. He does ambulate with supportive new balance tennis shoes. No other complaints are mentioned. Past medical history, medications, and allergies reviewed. Allergies: Patient has no known allergies. Current Outpatient Medications   Medication Sig Dispense Refill    HYDRALAZINE 100 MG Oral Tab TAKE 1 TABLET BY MOUTH TWICE A  tablet 1    METOPROLOL SUCCINATE ER 50 MG Oral Tablet 24 Hr TAKE 1 TABLET BY MOUTH TWICE A  tablet 1    ATORVASTATIN 10 MG Oral Tab TAKE 1 TABLET BY MOUTH EVERY DAY 90 tablet 1    acetaminophen 325 MG Oral Tab Take 2 tablets (650 mg total) by mouth in the morning and 2 tablets (650 mg total) before bedtime. rivaroxaban (XARELTO) 20 MG Oral Tab TAKE 1 TABLET BY MOUTH EVERY DAY WITH FOOD 90 tablet 3    famotidine 10 MG Oral Tab Take 1 tablet (10 mg total) by mouth at bedtime.         Past Medical History:   Diagnosis Date    Anesthesia complication     Aortic insufficiency 4/7/2014    Arrhythmia     Atrial flutter (HCC)     Bad breath     possibly d/t GERD    Cancer (HCC)     basal cell skin cancer    Cataract     Cholelithiasis     Coronary atherosclerosis of unspecified type of vessel, native or graft     Difficult intubation 7/14/93    Dr. Renald Spatz gave letter - will bring with him    DVT (deep venous thrombosis) ([facility] [address] 4/7/2014    Earache     secondary to TMJ    Esophageal reflux     Hearing impairment     HEART ATTACK     HIGH BLOOD PRESSURE     HIGH CHOLESTEROL     Hypercholesterolemia 4/7/2014    Long term (current) use of anticoagulants 2014    Nephrotic syndrome     Other and unspecified hyperlipidemia     PVC's (premature ventricular contractions) 2014    Renal disorder     Shortness of breath     Syncope 2014    Testicular pain     likely from under treated epididymitis    Unspecified essential hypertension     Visual impairment     glasses      Past Surgical History:   Procedure Laterality Date    CABG      triple bypass    CATARACT      CATH PERCUTANEOUS  TRANSLUMINAL CORONARY ANGIOPLASTY      early      CHOLECYSTECTOMY      COLONOSCOPY  2004, 2011    x2    OPEN HEART SURGERY (PBP)      SKIN SURGERY Right 14    Excision for BCC, nodular to right upper back, [place]    SKIN SURGERY Left 14    MMS for BCC-nod to the L Nasal Supratip    SKIN SURGERY  16    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    SPLINT, HAMMER TOE      TONSILLECTOMY        Family History   Problem Relation Age of Onset    Heart Disease Father     Other (Nannette Bonnet disease) Brother       Social History    Socioeconomic History      Marital status:       Number of children: 1    Occupational History      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     Tobacco Use      Smoking status: Former        Types: Cigarettes        Quit date: 1976        Years since quittin.1      Smokeless tobacco: Never    Vaping Use      Vaping Use: Never used    Substance and Sexual Activity      Alcohol use: Not Currently        Comment: occassional      Drug use: No    Other Topics      Concerns:        Caffeine Concern: Yes          4-5 coffees, tea once a day        Exercise: No          REVIEW OF SYSTEMS:     Today reviewed systems as documented below  GENERAL HEALTH: feels well otherwise  SKIN: denies any unusual skin lesions or rashes  RESPIRATORY: denies shortness of breath with exertion  CARDIOVASCULAR: denies chest pain on exertion  GI: denies abdominal pain and denies heartburn  NEURO: denies headaches  MUSCULO: denies arthritis, back pain      EXAM:   There were no vitals taken for this visit. GENERAL: well developed, well nourished, in no apparent distress  EXTREMITIES:   1. Integument: Normal skin temperature and turgor. Nails x10 are elongated, thickened, dystrophic, small debris. HPK noted subfirst, third, and fifth metatarsal head bilaterally. 2. Vascular: Pedal pulses are palpable, capillary refill normal.   3. Musculoskeletal: All muscle groups are graded 5 out of 5 in the foot and ankle. Flexion contracture of lesser digits appreciated. Prominent metatarsal heads noted bilaterally. 4. Neurological: Normal sharp dull sensation; reflexes normal.          ASSESSMENT AND PLAN:   Diagnoses and all orders for this visit:    Onychomycosis    Pre-ulcerative calluses    Hammer toes of both feet    Metatarsalgia of both feet    Toe pain, bilateral    Stage 3 chronic kidney disease, unspecified whether stage 3a or 3b CKD ([facility] [address]        Plan:     -Patient examined, chart history reviewed. -Discussed importance of proper pedal hygiene, regular foot checks, and tight glucose control.  -Sharply debrided nails x10 with a sterile nail nipper achieving a 20% reduction in thickness and length, without incident. Nails further smoothed with dremel.  -Pared HPKs x4 with #15 blade to healthy tissue without incident. Patient to apply urea cream to site between visits to prevent buildup and ambulate with supportive shoes and avoid walking barefoot.  -Educated patient on acute signs of infection advised patient to seek immediate medical attention if symptoms arise. The patient indicates understanding of these issues and agrees to the plan. Return in about 2 months (around 10/16/2023) for routine foot care.     Nixon Alcantara DPM

## 2023-08-17 NOTE — PATIENT INSTRUCTIONS
Ambulate with supportive shoes and avoid walking barefoot. Check feet daily. Follow-up in 2 months or sooner if other concerns arise.

## 2023-09-07 ENCOUNTER — OFFICE VISIT (OUTPATIENT)
Dept: INTERNAL MEDICINE CLINIC | Facility: CLINIC | Age: 83
End: 2023-09-07
Payer: MEDICARE

## 2023-09-07 VITALS
SYSTOLIC BLOOD PRESSURE: 130 MMHG | DIASTOLIC BLOOD PRESSURE: 60 MMHG | BODY MASS INDEX: 25.93 KG/M2 | OXYGEN SATURATION: 95 % | WEIGHT: 202 LBS | HEART RATE: 77 BPM | RESPIRATION RATE: 16 BRPM | HEIGHT: 74 IN

## 2023-09-07 DIAGNOSIS — I25.10 CAD IN NATIVE ARTERY: ICD-10-CM

## 2023-09-07 DIAGNOSIS — E78.2 MIXED HYPERLIPIDEMIA: ICD-10-CM

## 2023-09-07 DIAGNOSIS — I10 BENIGN ESSENTIAL HTN: Primary | ICD-10-CM

## 2023-09-07 PROCEDURE — 99214 OFFICE O/P EST MOD 30 MIN: CPT | Performed by: INTERNAL MEDICINE

## 2023-10-01 PROCEDURE — 99284 EMERGENCY DEPT VISIT MOD MDM: CPT

## 2023-10-02 ENCOUNTER — HOSPITAL ENCOUNTER (EMERGENCY)
Facility: HOSPITAL | Age: 83
Discharge: HOME | End: 2023-10-02
Attending: EMERGENCY MEDICINE

## 2023-10-02 ENCOUNTER — APPOINTMENT (OUTPATIENT)
Dept: CT IMAGING | Facility: HOSPITAL | Age: 83
End: 2023-10-02
Attending: EMERGENCY MEDICINE

## 2023-10-02 VITALS
HEART RATE: 88 BPM | TEMPERATURE: 98 F | OXYGEN SATURATION: 95 % | SYSTOLIC BLOOD PRESSURE: 147 MMHG | DIASTOLIC BLOOD PRESSURE: 112 MMHG | BODY MASS INDEX: 26.05 KG/M2 | WEIGHT: 203 LBS | HEIGHT: 74 IN | RESPIRATION RATE: 12 BRPM

## 2023-10-02 DIAGNOSIS — S09.90XA ACUTE HEAD INJURY, INITIAL ENCOUNTER: Primary | ICD-10-CM

## 2023-10-02 DIAGNOSIS — S00.03XA HEMATOMA OF SCALP, INITIAL ENCOUNTER: ICD-10-CM

## 2023-10-02 PROCEDURE — 70450 CT HEAD/BRAIN W/O DYE: CPT | Performed by: EMERGENCY MEDICINE

## 2023-10-03 ENCOUNTER — OFFICE VISIT (OUTPATIENT)
Dept: INTERNAL MEDICINE CLINIC | Facility: CLINIC | Age: 83
End: 2023-10-03
Payer: MEDICARE

## 2023-10-03 VITALS
RESPIRATION RATE: 16 BRPM | DIASTOLIC BLOOD PRESSURE: 70 MMHG | HEART RATE: 70 BPM | TEMPERATURE: 97 F | OXYGEN SATURATION: 96 % | SYSTOLIC BLOOD PRESSURE: 136 MMHG

## 2023-10-03 DIAGNOSIS — Z23 NEED FOR INFLUENZA VACCINATION: ICD-10-CM

## 2023-10-03 DIAGNOSIS — I50.32 CHRONIC DIASTOLIC HEART FAILURE (HCC): ICD-10-CM

## 2023-10-03 DIAGNOSIS — S00.83XD TRAUMATIC HEMATOMA OF FOREHEAD, SUBSEQUENT ENCOUNTER: Primary | ICD-10-CM

## 2023-10-03 DIAGNOSIS — M79.89 LEFT LEG SWELLING: ICD-10-CM

## 2023-10-03 PROCEDURE — G0008 ADMIN INFLUENZA VIRUS VAC: HCPCS | Performed by: PHYSICIAN ASSISTANT

## 2023-10-03 PROCEDURE — 99214 OFFICE O/P EST MOD 30 MIN: CPT | Performed by: PHYSICIAN ASSISTANT

## 2023-10-03 PROCEDURE — 90662 IIV NO PRSV INCREASED AG IM: CPT | Performed by: PHYSICIAN ASSISTANT

## 2023-10-03 RX ORDER — FUROSEMIDE 20 MG/1
20 TABLET ORAL EVERY MORNING
Qty: 5 TABLET | Refills: 0 | Status: SHIPPED | OUTPATIENT
Start: 2023-10-03

## 2023-10-10 ENCOUNTER — OFFICE VISIT (OUTPATIENT)
Dept: INTERNAL MEDICINE CLINIC | Facility: CLINIC | Age: 83
End: 2023-10-10
Payer: MEDICARE

## 2023-10-10 VITALS
OXYGEN SATURATION: 97 % | DIASTOLIC BLOOD PRESSURE: 66 MMHG | RESPIRATION RATE: 16 BRPM | SYSTOLIC BLOOD PRESSURE: 136 MMHG | HEIGHT: 74 IN | BODY MASS INDEX: 26.05 KG/M2 | TEMPERATURE: 97 F | WEIGHT: 203 LBS | HEART RATE: 67 BPM

## 2023-10-10 DIAGNOSIS — S00.83XD TRAUMATIC HEMATOMA OF FOREHEAD, SUBSEQUENT ENCOUNTER: Primary | ICD-10-CM

## 2023-10-10 DIAGNOSIS — M79.89 LEFT LEG SWELLING: ICD-10-CM

## 2023-10-10 PROCEDURE — 99214 OFFICE O/P EST MOD 30 MIN: CPT | Performed by: PHYSICIAN ASSISTANT

## 2023-10-10 PROCEDURE — 1125F AMNT PAIN NOTED PAIN PRSNT: CPT | Performed by: PHYSICIAN ASSISTANT

## 2023-10-10 RX ORDER — FUROSEMIDE 20 MG/1
20 TABLET ORAL EVERY MORNING
Qty: 5 TABLET | Refills: 0 | Status: SHIPPED | OUTPATIENT
Start: 2023-10-10

## 2023-10-17 ENCOUNTER — OFFICE VISIT (OUTPATIENT)
Dept: PODIATRY CLINIC | Facility: CLINIC | Age: 83
End: 2023-10-17

## 2023-10-17 DIAGNOSIS — N18.30 STAGE 3 CHRONIC KIDNEY DISEASE, UNSPECIFIED WHETHER STAGE 3A OR 3B CKD (HCC): ICD-10-CM

## 2023-10-17 DIAGNOSIS — M79.675 TOE PAIN, BILATERAL: ICD-10-CM

## 2023-10-17 DIAGNOSIS — L84 FOOT CALLUS: ICD-10-CM

## 2023-10-17 DIAGNOSIS — M77.41 METATARSALGIA OF BOTH FEET: ICD-10-CM

## 2023-10-17 DIAGNOSIS — L84 PRE-ULCERATIVE CALLUSES: ICD-10-CM

## 2023-10-17 DIAGNOSIS — M20.42 HAMMER TOES OF BOTH FEET: ICD-10-CM

## 2023-10-17 DIAGNOSIS — B35.1 ONYCHOMYCOSIS: Primary | ICD-10-CM

## 2023-10-17 DIAGNOSIS — M20.41 HAMMER TOES OF BOTH FEET: ICD-10-CM

## 2023-10-17 DIAGNOSIS — M77.42 METATARSALGIA OF BOTH FEET: ICD-10-CM

## 2023-10-17 DIAGNOSIS — M79.674 TOE PAIN, BILATERAL: ICD-10-CM

## 2023-10-17 PROCEDURE — 99213 OFFICE O/P EST LOW 20 MIN: CPT | Performed by: STUDENT IN AN ORGANIZED HEALTH CARE EDUCATION/TRAINING PROGRAM

## 2023-10-18 ENCOUNTER — LAB ENCOUNTER (OUTPATIENT)
Dept: LAB | Age: 83
End: 2023-10-18
Attending: PHYSICIAN ASSISTANT
Payer: MEDICARE

## 2023-10-18 ENCOUNTER — HOSPITAL ENCOUNTER (OUTPATIENT)
Dept: ULTRASOUND IMAGING | Age: 83
Discharge: HOME | End: 2023-10-18
Attending: PHYSICIAN ASSISTANT
Payer: MEDICARE

## 2023-10-18 DIAGNOSIS — I10 BENIGN ESSENTIAL HTN: ICD-10-CM

## 2023-10-18 DIAGNOSIS — M79.89 LEFT LEG SWELLING: ICD-10-CM

## 2023-10-18 DIAGNOSIS — E78.2 MIXED HYPERLIPIDEMIA: ICD-10-CM

## 2023-10-18 DIAGNOSIS — N18.30 STAGE 3 CHRONIC KIDNEY DISEASE, UNSPECIFIED WHETHER STAGE 3A OR 3B CKD (HCC): ICD-10-CM

## 2023-10-18 LAB
ALBUMIN SERPL-MCNC: 3.1 G/DL (ref 3.4–5)
ALBUMIN/GLOB SERPL: 0.7 {RATIO} (ref 1–2)
ALP LIVER SERPL-CCNC: 101 U/L
ALT SERPL-CCNC: 24 U/L
ANION GAP SERPL CALC-SCNC: 6 MMOL/L (ref 0–18)
AST SERPL-CCNC: 18 U/L (ref 15–37)
BASOPHILS # BLD AUTO: 0.07 X10(3) UL (ref 0–0.2)
BASOPHILS NFR BLD AUTO: 0.7 %
BILIRUB SERPL-MCNC: 0.9 MG/DL (ref 0.1–2)
BUN BLD-MCNC: 22 MG/DL (ref 7–18)
CALCIUM BLD-MCNC: 9.2 MG/DL (ref 8.5–10.1)
CHLORIDE SERPL-SCNC: 107 MMOL/L (ref 98–112)
CHOLEST SERPL-MCNC: 133 MG/DL (ref ?–200)
CO2 SERPL-SCNC: 25 MMOL/L (ref 21–32)
CREAT BLD-MCNC: 1.35 MG/DL
EGFRCR SERPLBLD CKD-EPI 2021: 51 ML/MIN/1.73M2 (ref 60–?)
EOSINOPHIL # BLD AUTO: 0.12 X10(3) UL (ref 0–0.7)
EOSINOPHIL NFR BLD AUTO: 1.2 %
ERYTHROCYTE [DISTWIDTH] IN BLOOD BY AUTOMATED COUNT: 14 %
FASTING PATIENT LIPID ANSWER: YES
FASTING STATUS PATIENT QL REPORTED: YES
GLOBULIN PLAS-MCNC: 4.3 G/DL (ref 2.8–4.4)
GLUCOSE BLD-MCNC: 95 MG/DL (ref 70–99)
HCT VFR BLD AUTO: 42.3 %
HDLC SERPL-MCNC: 41 MG/DL (ref 40–59)
HGB BLD-MCNC: 13.7 G/DL
IMM GRANULOCYTES # BLD AUTO: 0.05 X10(3) UL (ref 0–1)
IMM GRANULOCYTES NFR BLD: 0.5 %
LDLC SERPL CALC-MCNC: 67 MG/DL (ref ?–100)
LYMPHOCYTES # BLD AUTO: 2.7 X10(3) UL (ref 1–4)
LYMPHOCYTES NFR BLD AUTO: 27.2 %
MCH RBC QN AUTO: 28.2 PG (ref 26–34)
MCHC RBC AUTO-ENTMCNC: 32.4 G/DL (ref 31–37)
MCV RBC AUTO: 87 FL
MONOCYTES # BLD AUTO: 1.07 X10(3) UL (ref 0.1–1)
MONOCYTES NFR BLD AUTO: 10.8 %
NEUTROPHILS # BLD AUTO: 5.91 X10 (3) UL (ref 1.5–7.7)
NEUTROPHILS # BLD AUTO: 5.91 X10(3) UL (ref 1.5–7.7)
NEUTROPHILS NFR BLD AUTO: 59.6 %
NONHDLC SERPL-MCNC: 92 MG/DL (ref ?–130)
OSMOLALITY SERPL CALC.SUM OF ELEC: 289 MOSM/KG (ref 275–295)
PLATELET # BLD AUTO: 291 10(3)UL (ref 150–450)
POTASSIUM SERPL-SCNC: 4.5 MMOL/L (ref 3.5–5.1)
PROT SERPL-MCNC: 7.4 G/DL (ref 6.4–8.2)
RBC # BLD AUTO: 4.86 X10(6)UL
SODIUM SERPL-SCNC: 138 MMOL/L (ref 136–145)
TRIGL SERPL-MCNC: 140 MG/DL (ref 30–149)
TSI SER-ACNC: 1.58 MIU/ML (ref 0.36–3.74)
VLDLC SERPL CALC-MCNC: 21 MG/DL (ref 0–30)
WBC # BLD AUTO: 9.9 X10(3) UL (ref 4–11)

## 2023-10-18 PROCEDURE — 85025 COMPLETE CBC W/AUTO DIFF WBC: CPT

## 2023-10-18 PROCEDURE — 80053 COMPREHEN METABOLIC PANEL: CPT

## 2023-10-18 PROCEDURE — 93971 EXTREMITY STUDY: CPT | Performed by: PHYSICIAN ASSISTANT

## 2023-10-18 PROCEDURE — 80061 LIPID PANEL: CPT

## 2023-10-18 PROCEDURE — 84443 ASSAY THYROID STIM HORMONE: CPT

## 2023-10-18 PROCEDURE — 36415 COLL VENOUS BLD VENIPUNCTURE: CPT

## 2023-10-19 ENCOUNTER — LAB ENCOUNTER (OUTPATIENT)
Dept: LAB | Age: 83
End: 2023-10-19
Attending: INTERNAL MEDICINE
Payer: MEDICARE

## 2023-10-19 DIAGNOSIS — I10 BENIGN ESSENTIAL HTN: ICD-10-CM

## 2023-10-19 DIAGNOSIS — N18.30 STAGE 3 CHRONIC KIDNEY DISEASE, UNSPECIFIED WHETHER STAGE 3A OR 3B CKD (HCC): ICD-10-CM

## 2023-10-19 DIAGNOSIS — E78.2 MIXED HYPERLIPIDEMIA: ICD-10-CM

## 2023-10-19 LAB
BILIRUB UR QL STRIP.AUTO: NEGATIVE
CLARITY UR REFRACT.AUTO: CLEAR
GLUCOSE UR STRIP.AUTO-MCNC: NORMAL MG/DL
KETONES UR STRIP.AUTO-MCNC: NEGATIVE MG/DL
LEUKOCYTE ESTERASE UR QL STRIP.AUTO: NEGATIVE
NITRITE UR QL STRIP.AUTO: NEGATIVE
PH UR STRIP.AUTO: 5 [PH] (ref 5–8)
PROT UR STRIP.AUTO-MCNC: NEGATIVE MG/DL
RBC UR QL AUTO: NEGATIVE
SP GR UR STRIP.AUTO: 1.02 (ref 1–1.03)
UROBILINOGEN UR STRIP.AUTO-MCNC: NORMAL MG/DL

## 2023-10-19 PROCEDURE — 81003 URINALYSIS AUTO W/O SCOPE: CPT

## 2023-10-21 NOTE — PROGRESS NOTES
[facility] Podiatry  Progress Note    Karishma Guillaume is a 78year old male. Patient presents with:  Toenail Care: F/u toenail care- elderly patient unable to trim toenails due to medical condition. HPI:     Patient is a pleasant 78-year-old male with past medical history of CKD who presents to clinic for foot exam.  He has elongated nails and thickened calluses he has difficulty trimming on his own. He also has preulcerative calluses to his plantar feet that start to cause pain at times. He has been inconsistently using felt offloading pads to sites. He does ambulate with supportive new balance tennis shoes. No other complaints are mentioned at this time. Past medical history, medications, and allergies reviewed. Allergies: Patient has no known allergies. Current Outpatient Medications   Medication Sig Dispense Refill    furosemide 20 MG Oral Tab Take 1 tablet (20 mg total) by mouth every morning. 5 tablet 0    HYDRALAZINE 100 MG Oral Tab TAKE 1 TABLET BY MOUTH TWICE A DAY (Patient taking differently: 0.5 tablets (50 mg total) 2 (two) times daily. Patient takes half tab in morning and half tab at night) 180 tablet 1    METOPROLOL SUCCINATE ER 50 MG Oral Tablet 24 Hr TAKE 1 TABLET BY MOUTH TWICE A  tablet 1    ATORVASTATIN 10 MG Oral Tab TAKE 1 TABLET BY MOUTH EVERY DAY 90 tablet 1    acetaminophen 325 MG Oral Tab Take 2 tablets (650 mg total) by mouth in the morning and 2 tablets (650 mg total) before bedtime. 1 in the morning and 1 at night.      rivaroxaban (XARELTO) 20 MG Oral Tab TAKE 1 TABLET BY MOUTH EVERY DAY WITH FOOD 90 tablet 3    famotidine 10 MG Oral Tab Take 1 tablet (10 mg total) by mouth at bedtime.         Past Medical History:   Diagnosis Date    Anesthesia complication     Aortic insufficiency 4/7/2014    Arrhythmia     Atrial flutter (HCC)     Bad breath     possibly d/t GERD    Cancer (HCC)     basal cell skin cancer    Cataract     Cholelithiasis     Coronary atherosclerosis of unspecified type of vessel, native or graft     Difficult intubation 93    Dr. Beverly Hernandez gave letter - will bring with him    DVT (deep venous thrombosis) ([facility] [address] 2014    Earache     secondary to TMJ    Esophageal reflux     Hearing impairment     HEART ATTACK     HIGH BLOOD PRESSURE     HIGH CHOLESTEROL     Hypercholesterolemia 2014    Long term (current) use of anticoagulants 2014    Nephrotic syndrome     Other and unspecified hyperlipidemia     PVC's (premature ventricular contractions) 2014    Renal disorder     Shortness of breath     Syncope 2014    Testicular pain     likely from under treated epididymitis    Unspecified essential hypertension     Visual impairment     glasses      Past Surgical History:   Procedure Laterality Date    CABG      triple bypass    CATARACT      CATH PERCUTANEOUS  TRANSLUMINAL CORONARY ANGIOPLASTY      early      CHOLECYSTECTOMY      COLONOSCOPY  2004, 2011    x2    OPEN HEART SURGERY (PBP)      SKIN SURGERY Right 14    Excision for BCC, nodular to right upper back, [place]    SKIN SURGERY Left 14    MMS for BCC-nod to the L Nasal Supratip    SKIN SURGERY  16    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    SPLINT, HAMMER TOE      TONSILLECTOMY        Family History   Problem Relation Age of Onset    Heart Disease Father     Other (Agustin Balo disease) Brother       Social History    Socioeconomic History      Marital status:       Number of children: 1    Occupational History      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     Tobacco Use      Smoking status: Former        Types: Cigarettes        Quit date: 1976        Years since quittin.3      Smokeless tobacco: Never    Vaping Use      Vaping Use: Never used    Substance and Sexual Activity      Alcohol use: Yes        Comment: occassional      Drug use: No    Other Topics      Concerns:        Caffeine Concern: Yes          4-5 coffees, tea once a day        Exercise: No          REVIEW OF SYSTEMS:     Today reviewed systems as documented below  GENERAL HEALTH: feels well otherwise  SKIN: denies any unusual skin lesions or rashes  RESPIRATORY: denies shortness of breath with exertion  CARDIOVASCULAR: denies chest pain on exertion  GI: denies abdominal pain and denies heartburn  NEURO: denies headaches  MUSCULO: denies arthritis, back pain      EXAM:   There were no vitals taken for this visit. GENERAL: well developed, well nourished, in no apparent distress  EXTREMITIES:   1. Integument: Normal skin temperature and turgor. Nails x10 are elongated, thickened, dystrophic, and with subungual debris. HPK noted subfirst, third, and fifth metatarsal head bilaterally. 2. Vascular: Pedal pulses are palpable, capillary refill normal.   3. Musculoskeletal: All muscle groups are graded 5 out of 5 in the foot and ankle. Flexion contracture of lesser digits appreciated. Prominent metatarsal heads noted bilaterally. 4. Neurological: Normal sharp dull sensation; reflexes normal.          ASSESSMENT AND PLAN:   Diagnoses and all orders for this visit:    Onychomycosis    Pre-ulcerative calluses    Hammer toes of both feet    Metatarsalgia of both feet    Toe pain, bilateral    Stage 3 chronic kidney disease, unspecified whether stage 3a or 3b CKD (HCC)    Foot callus        Plan:     -Patient examined, chart history reviewed. -Discussed importance of proper pedal hygiene, regular foot checks, and tight glucose control.  -Sharply debrided nails x10 with a sterile nail nipper achieving a 20% reduction in thickness and length, without incident. Nails further smoothed with dremel.  -Pared HPKs x4 with #15 blade to healthy tissue without incident.   Patient to apply urea cream to site between visits to prevent buildup and ambulate with supportive shoes and avoid walking barefoot.  -Educated patient on acute signs of infection advised patient to seek immediate medical attention if symptoms arise. The patient indicates understanding of these issues and agrees to the plan. RTC 2 months.     Pauline Higgins DPM

## 2023-11-13 ENCOUNTER — OFFICE VISIT (OUTPATIENT)
Dept: INTERNAL MEDICINE CLINIC | Facility: CLINIC | Age: 83
End: 2023-11-13
Payer: MEDICARE

## 2023-11-13 VITALS
OXYGEN SATURATION: 98 % | SYSTOLIC BLOOD PRESSURE: 132 MMHG | HEART RATE: 80 BPM | DIASTOLIC BLOOD PRESSURE: 80 MMHG | HEIGHT: 74 IN | BODY MASS INDEX: 26.05 KG/M2 | WEIGHT: 203 LBS | TEMPERATURE: 98 F | RESPIRATION RATE: 16 BRPM

## 2023-11-13 DIAGNOSIS — Z85.528 HISTORY OF RENAL CELL CARCINOMA: ICD-10-CM

## 2023-11-13 DIAGNOSIS — I48.92 ATRIAL FLUTTER, PAROXYSMAL (HCC): ICD-10-CM

## 2023-11-13 DIAGNOSIS — I10 BENIGN ESSENTIAL HTN: ICD-10-CM

## 2023-11-13 DIAGNOSIS — Z00.00 ANNUAL PHYSICAL EXAM: Primary | ICD-10-CM

## 2023-11-13 DIAGNOSIS — I25.10 CAD IN NATIVE ARTERY: ICD-10-CM

## 2023-11-13 DIAGNOSIS — Z00.00 ENCOUNTER FOR ANNUAL HEALTH EXAMINATION: ICD-10-CM

## 2023-11-13 DIAGNOSIS — R91.8 LUNG NODULES: ICD-10-CM

## 2023-11-13 DIAGNOSIS — N05.0 MINIMAL CHANGE GLOMERULAR DISEASE: ICD-10-CM

## 2023-11-13 DIAGNOSIS — I50.32 CHRONIC DIASTOLIC HEART FAILURE (HCC): ICD-10-CM

## 2023-11-13 DIAGNOSIS — N13.8 BPH WITH OBSTRUCTION/LOWER URINARY TRACT SYMPTOMS: ICD-10-CM

## 2023-11-13 DIAGNOSIS — N18.30 STAGE 3 CHRONIC KIDNEY DISEASE, UNSPECIFIED WHETHER STAGE 3A OR 3B CKD (HCC): ICD-10-CM

## 2023-11-13 DIAGNOSIS — E78.2 MIXED HYPERLIPIDEMIA: ICD-10-CM

## 2023-11-13 DIAGNOSIS — I71.43 INFRARENAL ABDOMINAL AORTIC ANEURYSM (AAA) WITHOUT RUPTURE (HCC): ICD-10-CM

## 2023-11-13 DIAGNOSIS — N40.1 BPH WITH OBSTRUCTION/LOWER URINARY TRACT SYMPTOMS: ICD-10-CM

## 2023-11-13 DIAGNOSIS — I35.8 AORTIC VALVE SCLEROSIS: ICD-10-CM

## 2023-12-01 ENCOUNTER — HOSPITAL ENCOUNTER (OUTPATIENT)
Dept: CT IMAGING | Facility: HOSPITAL | Age: 83
Discharge: HOME | End: 2023-12-01
Attending: INTERNAL MEDICINE
Payer: MEDICARE

## 2023-12-01 DIAGNOSIS — I71.43 INFRARENAL ABDOMINAL AORTIC ANEURYSM (AAA) WITHOUT RUPTURE (HCC): ICD-10-CM

## 2023-12-01 LAB
CREAT BLD-MCNC: 1.2 MG/DL
EGFRCR SERPLBLD CKD-EPI 2021: 59 ML/MIN/1.73M2 (ref 60–?)

## 2023-12-01 PROCEDURE — 74174 CTA ABD&PLVS W/CONTRAST: CPT | Performed by: INTERNAL MEDICINE

## 2023-12-01 PROCEDURE — 82565 ASSAY OF CREATININE: CPT

## 2023-12-01 RX ORDER — IOHEXOL 350 MG/ML
100 INJECTION, SOLUTION INTRAVENOUS
Status: COMPLETED | OUTPATIENT
Start: 2023-12-01 | End: 2023-12-01

## 2023-12-01 RX ADMIN — IOHEXOL 100 ML: 350 INJECTION, SOLUTION INTRAVENOUS at 14:49:00

## 2023-12-11 ENCOUNTER — TELEPHONE (OUTPATIENT)
Dept: INTERNAL MEDICINE CLINIC | Facility: CLINIC | Age: 83
End: 2023-12-11

## 2023-12-11 ENCOUNTER — OFFICE VISIT (OUTPATIENT)
Dept: INTERNAL MEDICINE CLINIC | Facility: CLINIC | Age: 83
End: 2023-12-11
Payer: MEDICARE

## 2023-12-11 VITALS
SYSTOLIC BLOOD PRESSURE: 132 MMHG | HEART RATE: 70 BPM | WEIGHT: 203 LBS | HEIGHT: 74 IN | OXYGEN SATURATION: 97 % | BODY MASS INDEX: 26.05 KG/M2 | RESPIRATION RATE: 14 BRPM | TEMPERATURE: 98 F | DIASTOLIC BLOOD PRESSURE: 80 MMHG

## 2023-12-11 DIAGNOSIS — B02.22 ACUTE TRIGEMINAL HERPES ZOSTER: Primary | ICD-10-CM

## 2023-12-11 PROCEDURE — 99214 OFFICE O/P EST MOD 30 MIN: CPT | Performed by: INTERNAL MEDICINE

## 2023-12-11 RX ORDER — PREDNISONE 20 MG/1
TABLET ORAL
Qty: 10 TABLET | Refills: 0 | Status: SHIPPED | OUTPATIENT
Start: 2023-12-11

## 2023-12-11 RX ORDER — FAMCICLOVIR 500 MG/1
500 TABLET ORAL 3 TIMES DAILY
Qty: 30 TABLET | Refills: 0 | Status: SHIPPED | OUTPATIENT
Start: 2023-12-11

## 2023-12-11 NOTE — PROGRESS NOTES
Subjective:   Patient ID: Gloria Reyna is a 78year old male. Rash  This is a new problem. The current episode started yesterday. The problem is unchanged. Location: left periobital area. The rash is characterized by itchiness. It is unknown if there was an exposure to a precipitant. Pertinent negatives include no congestion, cough, diarrhea, eye pain, facial edema, joint pain, shortness of breath, sore throat or vomiting. (No vision changes  ) Past treatments include nothing. The treatment provided no relief. History/Other:   Review of Systems   HENT:  Negative for congestion and sore throat. Eyes:  Negative for pain. Respiratory:  Negative for cough and shortness of breath. Gastrointestinal:  Negative for diarrhea and vomiting. Musculoskeletal:  Negative for joint pain. Skin:  Positive for rash. All other systems reviewed and are negative. Current Outpatient Medications   Medication Sig Dispense Refill    famciclovir 500 MG Oral Tab Take 1 tablet (500 mg total) by mouth 3 (three) times daily. 30 tablet 0    predniSONE 20 MG Oral Tab Take 2 tablets by mouth daily x 5 mdays 10 tablet 0    furosemide 20 MG Oral Tab Take 1 tablet (20 mg total) by mouth every morning. 5 tablet 0    HYDRALAZINE 100 MG Oral Tab TAKE 1 TABLET BY MOUTH TWICE A DAY (Patient taking differently: 0.5 tablets (50 mg total) 2 (two) times daily. Patient takes half tab in morning and half tab at night) 180 tablet 1    METOPROLOL SUCCINATE ER 50 MG Oral Tablet 24 Hr TAKE 1 TABLET BY MOUTH TWICE A  tablet 1    ATORVASTATIN 10 MG Oral Tab TAKE 1 TABLET BY MOUTH EVERY DAY 90 tablet 1    acetaminophen 325 MG Oral Tab Take 2 tablets (650 mg total) by mouth in the morning and 2 tablets (650 mg total) before bedtime.  1 in the morning and 1 at night.      rivaroxaban (XARELTO) 20 MG Oral Tab TAKE 1 TABLET BY MOUTH EVERY DAY WITH FOOD 90 tablet 3    famotidine 10 MG Oral Tab Take 1 tablet (10 mg total) by mouth at bedtime. Allergies:No Known Allergies    Objective:   Physical Exam  Vitals and nursing note reviewed. Constitutional:       General: He is not in acute distress. HENT:      Head:     Cardiovascular:      Rate and Rhythm: Regular rhythm. Heart sounds: Normal heart sounds. Neurological:      Mental Status: He is alert. Assessment & Plan:   1. Acute trigeminal herpes zoster      - finish famivir and prednisone  - pt sent to see ophthalmology today for eye exam to ensure no involvement of eye  No orders of the defined types were placed in this encounter. Meds This Visit:  Requested Prescriptions     Signed Prescriptions Disp Refills    famciclovir 500 MG Oral Tab 30 tablet 0     Sig: Take 1 tablet (500 mg total) by mouth 3 (three) times daily.     predniSONE 20 MG Oral Tab 10 tablet 0     Sig: Take 2 tablets by mouth daily x 5 mdays       Imaging & Referrals:  None

## 2023-12-20 ENCOUNTER — OFFICE VISIT (OUTPATIENT)
Dept: PODIATRY CLINIC | Facility: CLINIC | Age: 83
End: 2023-12-20

## 2023-12-20 DIAGNOSIS — M77.41 METATARSALGIA OF BOTH FEET: ICD-10-CM

## 2023-12-20 DIAGNOSIS — M79.674 TOE PAIN, BILATERAL: ICD-10-CM

## 2023-12-20 DIAGNOSIS — L84 PRE-ULCERATIVE CALLUSES: ICD-10-CM

## 2023-12-20 DIAGNOSIS — B35.1 ONYCHOMYCOSIS: Primary | ICD-10-CM

## 2023-12-20 DIAGNOSIS — M20.42 HAMMER TOES OF BOTH FEET: ICD-10-CM

## 2023-12-20 DIAGNOSIS — M79.675 TOE PAIN, BILATERAL: ICD-10-CM

## 2023-12-20 DIAGNOSIS — L84 FOOT CALLUS: ICD-10-CM

## 2023-12-20 DIAGNOSIS — M77.42 METATARSALGIA OF BOTH FEET: ICD-10-CM

## 2023-12-20 DIAGNOSIS — M20.41 HAMMER TOES OF BOTH FEET: ICD-10-CM

## 2023-12-20 DIAGNOSIS — N18.30 STAGE 3 CHRONIC KIDNEY DISEASE, UNSPECIFIED WHETHER STAGE 3A OR 3B CKD (HCC): ICD-10-CM

## 2023-12-20 PROCEDURE — 1126F AMNT PAIN NOTED NONE PRSNT: CPT | Performed by: STUDENT IN AN ORGANIZED HEALTH CARE EDUCATION/TRAINING PROGRAM

## 2023-12-20 PROCEDURE — 99213 OFFICE O/P EST LOW 20 MIN: CPT | Performed by: STUDENT IN AN ORGANIZED HEALTH CARE EDUCATION/TRAINING PROGRAM

## 2023-12-21 NOTE — PROGRESS NOTES
[facility] Podiatry  Progress Note    Charlette Allan is a 78year old male. Chief Complaint   Patient presents with    Toenail Care     Nail care and foot check          HPI:     Patient is a pleasant 78-year-old male with past medical history of CKD who presents to clinic for foot exam.  He has elongated nails and thickened calluses he has difficulty trimming on his own. He also has preulcerative calluses to his plantar feet that start to cause pain at times. He does ambulate with supportive new balance tennis shoes. No other complaints are mentioned at this time. Past medical history, medications, and allergies reviewed. Allergies: Patient has no known allergies. Current Outpatient Medications   Medication Sig Dispense Refill    famciclovir 500 MG Oral Tab Take 1 tablet (500 mg total) by mouth 3 (three) times daily. 30 tablet 0    predniSONE 20 MG Oral Tab Take 2 tablets by mouth daily x 5 mdays 10 tablet 0    furosemide 20 MG Oral Tab Take 1 tablet (20 mg total) by mouth every morning. 5 tablet 0    HYDRALAZINE 100 MG Oral Tab TAKE 1 TABLET BY MOUTH TWICE A DAY (Patient taking differently: 0.5 tablets (50 mg total) 2 (two) times daily. Patient takes half tab in morning and half tab at night) 180 tablet 1    METOPROLOL SUCCINATE ER 50 MG Oral Tablet 24 Hr TAKE 1 TABLET BY MOUTH TWICE A  tablet 1    ATORVASTATIN 10 MG Oral Tab TAKE 1 TABLET BY MOUTH EVERY DAY 90 tablet 1    acetaminophen 325 MG Oral Tab Take 2 tablets (650 mg total) by mouth in the morning and 2 tablets (650 mg total) before bedtime. 1 in the morning and 1 at night.      rivaroxaban (XARELTO) 20 MG Oral Tab TAKE 1 TABLET BY MOUTH EVERY DAY WITH FOOD 90 tablet 3    famotidine 10 MG Oral Tab Take 1 tablet (10 mg total) by mouth at bedtime.         Past Medical History:   Diagnosis Date    Anesthesia complication     Aortic insufficiency 4/7/2014    Arrhythmia     Atrial flutter (HCC)     Bad breath     possibly d/t GERD Cancer ([facility]     basal cell skin cancer    Cataract     Cholelithiasis     Coronary atherosclerosis of unspecified type of vessel, native or graft     Difficult intubation 93    Dr. Emmett Macias gave letter - will bring with him    DVT (deep venous thrombosis) ([facility] 2014    Earache     secondary to TMJ    Esophageal reflux     Hearing impairment     HEART ATTACK     HIGH BLOOD PRESSURE     HIGH CHOLESTEROL     Hypercholesterolemia 2014    Long term (current) use of anticoagulants 2014    Nephrotic syndrome     Other and unspecified hyperlipidemia     PVC's (premature ventricular contractions) 2014    Renal disorder     Shortness of breath     Syncope 2014    Testicular pain     likely from under treated epididymitis    Unspecified essential hypertension     Visual impairment     glasses      Past Surgical History:   Procedure Laterality Date    CABG      triple bypass    CATARACT      CATH PERCUTANEOUS  TRANSLUMINAL CORONARY ANGIOPLASTY      early      CHOLECYSTECTOMY      COLONOSCOPY  2004, 2011    x2    OPEN HEART SURGERY (PBP)      SKIN SURGERY Right 14    Excision for BCC, nodular to right upper back, [place]    SKIN SURGERY Left 14    MMS for BCC-nod to the L Nasal Supratip    SKIN SURGERY  16    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    SPLINT, HAMMER TOE      TONSILLECTOMY        Family History   Problem Relation Age of Onset    Heart Disease Father     Other ([place] Morrow disease) Brother       Social History     Socioeconomic History    Marital status:     Number of children: 1   Occupational History    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    Tobacco Use    Smoking status: Former     Types: Cigarettes     Quit date: 1976     Years since quittin.4    Smokeless tobacco: Never   Vaping Use    Vaping Use: Never used   Substance and Sexual Activity    Alcohol use: Yes     Comment: occassional    Drug use: No   Other Topics Concern    Caffeine Concern Yes     Comment: 4-5 coffees, tea once a day    Exercise No           REVIEW OF SYSTEMS:     Today reviewed systems as documented below  GENERAL HEALTH: feels well otherwise  SKIN: denies any unusual skin lesions or rashes  RESPIRATORY: denies shortness of breath with exertion  CARDIOVASCULAR: denies chest pain on exertion  GI: denies abdominal pain and denies heartburn  NEURO: denies headaches  MUSCULO: denies arthritis, back pain      EXAM:   There were no vitals taken for this visit. GENERAL: well developed, well nourished, in no apparent distress  EXTREMITIES:   1. Integument: Normal skin temperature and turgor. Nails x10 are elongated, thickened, dystrophic, and with subungual debris. HPK that is pre ulcerative noted subfirst, third, and fifth metatarsal head bilaterally. 2. Vascular: Pedal pulses are palpable, capillary refill normal.   3. Musculoskeletal: All muscle groups are graded 5 out of 5 in the foot and ankle. Flexion contracture of lesser digits appreciated. Prominent metatarsal heads noted bilaterally. 4. Neurological: Normal sharp dull sensation; reflexes normal.          ASSESSMENT AND PLAN:   Diagnoses and all orders for this visit:    Onychomycosis    Pre-ulcerative calluses    Hammer toes of both feet    Metatarsalgia of both feet    Toe pain, bilateral    Stage 3 chronic kidney disease, unspecified whether stage 3a or 3b CKD (HCC)    Foot callus        Plan:     -Patient examined, chart history reviewed. -Discussed importance of proper pedal hygiene, regular foot checks, and tight glucose control.  -Sharply debrided nails x10 with a sterile nail nipper achieving a 20% reduction in thickness and length, without incident. Nails further smoothed with dremel.  -Pared HPKs x4 with #15 blade to healthy tissue without incident.   Patient to apply urea cream to site between visits to prevent buildup and ambulate with supportive shoes and avoid walking barefoot.  -Educated patient on acute signs of infection advised patient to seek immediate medical attention if symptoms arise. The patient indicates understanding of these issues and agrees to the plan. RTC 2 months.     Ariela Narayan DPM

## 2023-12-27 ENCOUNTER — APPOINTMENT (OUTPATIENT)
Dept: GENERAL RADIOLOGY | Facility: HOSPITAL | Age: 83
End: 2023-12-27
Attending: EMERGENCY MEDICINE
Payer: MEDICARE

## 2023-12-27 ENCOUNTER — APPOINTMENT (OUTPATIENT)
Dept: GENERAL RADIOLOGY | Facility: HOSPITAL | Age: 83
DRG: 871 | End: 2023-12-27
Attending: EMERGENCY MEDICINE
Payer: MEDICARE

## 2023-12-27 ENCOUNTER — HOSPITAL ENCOUNTER (INPATIENT)
Facility: HOSPITAL | Age: 83
LOS: 3 days | Discharge: HOME | End: 2023-12-30
Attending: EMERGENCY MEDICINE | Admitting: HOSPITALIST
Payer: MEDICARE

## 2023-12-27 ENCOUNTER — HOSPITAL ENCOUNTER (INPATIENT)
Facility: HOSPITAL | Age: 83
LOS: 3 days | Discharge: HOME HEALTH | End: 2023-12-30
Attending: EMERGENCY MEDICINE | Admitting: HOSPITALIST
Payer: MEDICARE

## 2023-12-27 ENCOUNTER — HOSPITAL ENCOUNTER (INPATIENT)
Facility: HOSPITAL | Age: 83
LOS: 3 days | Discharge: HOME | DRG: 871 | End: 2023-12-30
Attending: EMERGENCY MEDICINE | Admitting: HOSPITALIST
Payer: MEDICARE

## 2023-12-27 DIAGNOSIS — I10 ESSENTIAL HYPERTENSION: ICD-10-CM

## 2023-12-27 DIAGNOSIS — J18.9 COMMUNITY ACQUIRED PNEUMONIA OF LEFT LOWER LOBE OF LUNG: Primary | ICD-10-CM

## 2023-12-27 DIAGNOSIS — M79.89 LEFT LEG SWELLING: ICD-10-CM

## 2023-12-27 PROBLEM — A41.9 SEPTIC SHOCK (HCC): Status: ACTIVE | Noted: 2023-12-27

## 2023-12-27 PROBLEM — R65.21 SEPTIC SHOCK (HCC): Status: ACTIVE | Noted: 2023-12-27

## 2023-12-27 PROBLEM — E87.20 LACTIC ACIDOSIS: Status: ACTIVE | Noted: 2023-12-27

## 2023-12-27 LAB
ALBUMIN SERPL-MCNC: 3.2 G/DL (ref 3.4–5)
ALBUMIN/GLOB SERPL: 0.8 {RATIO} (ref 1–2)
ALP LIVER SERPL-CCNC: 76 U/L
ALT SERPL-CCNC: 27 U/L
ANION GAP SERPL CALC-SCNC: 6 MMOL/L (ref 0–18)
AST SERPL-CCNC: 10 U/L (ref 15–37)
BASOPHILS # BLD: 0 X10(3) UL (ref 0–0.2)
BASOPHILS NFR BLD: 0 %
BILIRUB SERPL-MCNC: 1.3 MG/DL (ref 0.1–2)
BILIRUB UR QL STRIP.AUTO: NEGATIVE
BUN BLD-MCNC: 19 MG/DL (ref 9–23)
CALCIUM BLD-MCNC: 9 MG/DL (ref 8.5–10.1)
CHLORIDE SERPL-SCNC: 106 MMOL/L (ref 98–112)
CLARITY UR REFRACT.AUTO: CLEAR
CO2 SERPL-SCNC: 26 MMOL/L (ref 21–32)
CREAT BLD-MCNC: 1.23 MG/DL
EGFRCR SERPLBLD CKD-EPI 2021: 58 ML/MIN/1.73M2 (ref 60–?)
EOSINOPHIL # BLD: 0 X10(3) UL (ref 0–0.7)
EOSINOPHIL NFR BLD: 0 %
ERYTHROCYTE [DISTWIDTH] IN BLOOD BY AUTOMATED COUNT: 13.8 %
FLUAV + FLUBV RNA SPEC NAA+PROBE: NEGATIVE
FLUAV + FLUBV RNA SPEC NAA+PROBE: NEGATIVE
GLOBULIN PLAS-MCNC: 4 G/DL (ref 2.8–4.4)
GLUCOSE BLD-MCNC: 104 MG/DL (ref 70–99)
GLUCOSE UR STRIP.AUTO-MCNC: NORMAL MG/DL
HCT VFR BLD AUTO: 45.8 %
HGB BLD-MCNC: 15.4 G/DL
KETONES UR STRIP.AUTO-MCNC: NEGATIVE MG/DL
LACTATE SERPL-SCNC: 1.5 MMOL/L (ref 0.4–2)
LACTATE SERPL-SCNC: 2.7 MMOL/L (ref 0.4–2)
LACTATE SERPL-SCNC: 2.8 MMOL/L (ref 0.4–2)
LACTATE SERPL-SCNC: 3.7 MMOL/L (ref 0.4–2)
LEUKOCYTE ESTERASE UR QL STRIP.AUTO: 25
LYMPHOCYTES NFR BLD: 0.61 X10(3) UL (ref 1–4)
LYMPHOCYTES NFR BLD: 2 %
MCH RBC QN AUTO: 28.8 PG (ref 26–34)
MCHC RBC AUTO-ENTMCNC: 33.6 G/DL (ref 31–37)
MCV RBC AUTO: 85.6 FL
MONOCYTES # BLD: 1.82 X10(3) UL (ref 0.1–1)
MONOCYTES NFR BLD: 6 %
MORPHOLOGY: NORMAL
NEUTROPHILS # BLD AUTO: 26.2 X10 (3) UL (ref 1.5–7.7)
NEUTROPHILS NFR BLD: 76 %
NEUTS BAND NFR BLD: 16 %
NEUTS HYPERSEG # BLD: 27.97 X10(3) UL (ref 1.5–7.7)
NITRITE UR QL STRIP.AUTO: NEGATIVE
OSMOLALITY SERPL CALC.SUM OF ELEC: 289 MOSM/KG (ref 275–295)
PH UR STRIP.AUTO: 5 [PH] (ref 5–8)
PLATELET # BLD AUTO: 188 10(3)UL (ref 150–450)
PLATELET MORPHOLOGY: NORMAL
POTASSIUM SERPL-SCNC: 3.9 MMOL/L (ref 3.5–5.1)
PROT SERPL-MCNC: 7.2 G/DL (ref 6.4–8.2)
PROT UR STRIP.AUTO-MCNC: 20 MG/DL
RBC # BLD AUTO: 5.35 X10(6)UL
RBC #/AREA URNS AUTO: >10 /HPF
RSV RNA SPEC NAA+PROBE: NEGATIVE
SARS-COV-2 RNA RESP QL NAA+PROBE: NOT DETECTED
SODIUM SERPL-SCNC: 138 MMOL/L (ref 136–145)
SP GR UR STRIP.AUTO: 1.02 (ref 1–1.03)
TOTAL CELLS COUNTED BLD: 100
TROPONIN I SERPL HS-MCNC: 25 NG/L
UROBILINOGEN UR STRIP.AUTO-MCNC: NORMAL MG/DL
WBC # BLD AUTO: 30.4 X10(3) UL (ref 4–11)

## 2023-12-27 PROCEDURE — 71045 X-RAY EXAM CHEST 1 VIEW: CPT | Performed by: EMERGENCY MEDICINE

## 2023-12-27 PROCEDURE — 99223 1ST HOSP IP/OBS HIGH 75: CPT | Performed by: STUDENT IN AN ORGANIZED HEALTH CARE EDUCATION/TRAINING PROGRAM

## 2023-12-27 RX ORDER — METOPROLOL SUCCINATE 50 MG/1
50 TABLET, EXTENDED RELEASE ORAL
Status: DISCONTINUED | OUTPATIENT
Start: 2023-12-27 | End: 2023-12-29

## 2023-12-27 RX ORDER — SODIUM CHLORIDE 9 MG/ML
INJECTION, SOLUTION INTRAVENOUS CONTINUOUS
Status: ACTIVE | OUTPATIENT
Start: 2023-12-27 | End: 2023-12-27

## 2023-12-27 RX ORDER — ACETAMINOPHEN 500 MG
500 TABLET ORAL EVERY 4 HOURS PRN
Status: DISCONTINUED | OUTPATIENT
Start: 2023-12-27 | End: 2023-12-30

## 2023-12-27 RX ORDER — SENNOSIDES 8.6 MG
17.2 TABLET ORAL NIGHTLY PRN
Status: DISCONTINUED | OUTPATIENT
Start: 2023-12-27 | End: 2023-12-30

## 2023-12-27 RX ORDER — BENZONATATE 100 MG/1
200 CAPSULE ORAL 3 TIMES DAILY PRN
Status: DISCONTINUED | OUTPATIENT
Start: 2023-12-27 | End: 2023-12-30

## 2023-12-27 RX ORDER — MELATONIN
3 NIGHTLY PRN
Status: DISCONTINUED | OUTPATIENT
Start: 2023-12-27 | End: 2023-12-30

## 2023-12-27 RX ORDER — BISACODYL 10 MG
10 SUPPOSITORY, RECTAL RECTAL
Status: DISCONTINUED | OUTPATIENT
Start: 2023-12-27 | End: 2023-12-30

## 2023-12-27 RX ORDER — ATORVASTATIN CALCIUM 10 MG/1
10 TABLET, FILM COATED ORAL NIGHTLY
Status: DISCONTINUED | OUTPATIENT
Start: 2023-12-27 | End: 2023-12-30

## 2023-12-27 RX ORDER — METOCLOPRAMIDE HYDROCHLORIDE 5 MG/ML
10 INJECTION INTRAMUSCULAR; INTRAVENOUS EVERY 8 HOURS PRN
Status: DISCONTINUED | OUTPATIENT
Start: 2023-12-27 | End: 2023-12-30

## 2023-12-27 RX ORDER — GUAIFENESIN 600 MG/1
600 TABLET, EXTENDED RELEASE ORAL 2 TIMES DAILY PRN
Status: DISCONTINUED | OUTPATIENT
Start: 2023-12-27 | End: 2023-12-30

## 2023-12-27 RX ORDER — ECHINACEA PURPUREA EXTRACT 125 MG
1 TABLET ORAL
Status: DISCONTINUED | OUTPATIENT
Start: 2023-12-27 | End: 2023-12-30

## 2023-12-27 RX ORDER — ENEMA 19; 7 G/133ML; G/133ML
1 ENEMA RECTAL ONCE AS NEEDED
Status: DISCONTINUED | OUTPATIENT
Start: 2023-12-27 | End: 2023-12-30

## 2023-12-27 RX ORDER — HYDRALAZINE HYDROCHLORIDE 50 MG/1
50 TABLET, FILM COATED ORAL 2 TIMES DAILY
Status: DISCONTINUED | OUTPATIENT
Start: 2023-12-27 | End: 2023-12-30

## 2023-12-27 RX ORDER — SODIUM CHLORIDE 9 MG/ML
INJECTION, SOLUTION INTRAVENOUS CONTINUOUS
Status: DISCONTINUED | OUTPATIENT
Start: 2023-12-27 | End: 2023-12-29

## 2023-12-27 RX ORDER — POLYETHYLENE GLYCOL 3350 17 G/17G
17 POWDER, FOR SOLUTION ORAL DAILY PRN
Status: DISCONTINUED | OUTPATIENT
Start: 2023-12-27 | End: 2023-12-30

## 2023-12-27 RX ORDER — ONDANSETRON 2 MG/ML
4 INJECTION INTRAMUSCULAR; INTRAVENOUS EVERY 6 HOURS PRN
Status: DISCONTINUED | OUTPATIENT
Start: 2023-12-27 | End: 2023-12-30

## 2023-12-27 NOTE — ED INITIAL ASSESSMENT (HPI)
Patient to ER from home via EMS. Per medics, patient was unable to get out of bed this AM & has been more weak than usual. Patient a.ox1 @ present. No fever noted, however patient warm to touch.

## 2023-12-27 NOTE — ED QUICK NOTES
Orders for admission, patient is aware of plan and ready to go upstairs. Any questions, please call ED RN Silas Monroe at extension [phone]. Patient Covid vaccination status: Fully vaccinated     COVID Test Ordered in ED: SARS-CoV-2/Flu A and B/RSV by PCR (GeneXpert)    COVID Suspicion at Admission: N/A    Running Infusions:  None    Mental Status/LOC at time of transport: A. OX1-2    Other pertinent information:   CIWA score: N/A   NIH score:  N/A

## 2023-12-27 NOTE — PROGRESS NOTES
NURSING ADMISSION NOTE      Patient admitted via Cart  Oriented to room. Safety precautions initiated. Bed in low position. Call light in reach. Admitted from ER. Patient alert, oriented to person, disoriented to time, place and situation. Respirations unlabored, lungs sound diminished,O2 sat 95% on room air. [id number] with patient

## 2023-12-28 ENCOUNTER — APPOINTMENT (OUTPATIENT)
Dept: ULTRASOUND IMAGING | Facility: HOSPITAL | Age: 83
DRG: 871 | End: 2023-12-28
Attending: STUDENT IN AN ORGANIZED HEALTH CARE EDUCATION/TRAINING PROGRAM
Payer: MEDICARE

## 2023-12-28 ENCOUNTER — APPOINTMENT (OUTPATIENT)
Dept: ULTRASOUND IMAGING | Facility: HOSPITAL | Age: 83
End: 2023-12-28
Attending: STUDENT IN AN ORGANIZED HEALTH CARE EDUCATION/TRAINING PROGRAM
Payer: MEDICARE

## 2023-12-28 LAB
ADENOVIRUS PCR:: NOT DETECTED
ALBUMIN SERPL-MCNC: 2.3 G/DL (ref 3.4–5)
ALBUMIN/GLOB SERPL: 0.7 {RATIO} (ref 1–2)
ALP LIVER SERPL-CCNC: 57 U/L
ALT SERPL-CCNC: 18 U/L
ANION GAP SERPL CALC-SCNC: 7 MMOL/L (ref 0–18)
AST SERPL-CCNC: 20 U/L (ref 15–37)
ATRIAL RATE: 242 BPM
B PARAPERT DNA SPEC QL NAA+PROBE: NOT DETECTED
B PERT DNA SPEC QL NAA+PROBE: NOT DETECTED
BASOPHILS # BLD AUTO: 0.03 X10(3) UL (ref 0–0.2)
BASOPHILS NFR BLD AUTO: 0.2 %
BILIRUB SERPL-MCNC: 0.5 MG/DL (ref 0.1–2)
BUN BLD-MCNC: 17 MG/DL (ref 9–23)
C PNEUM DNA SPEC QL NAA+PROBE: NOT DETECTED
CALCIUM BLD-MCNC: 8.2 MG/DL (ref 8.5–10.1)
CHLORIDE SERPL-SCNC: 111 MMOL/L (ref 98–112)
CO2 SERPL-SCNC: 20 MMOL/L (ref 21–32)
CORONAVIRUS 229E PCR:: NOT DETECTED
CORONAVIRUS HKU1 PCR:: NOT DETECTED
CORONAVIRUS NL63 PCR:: NOT DETECTED
CORONAVIRUS OC43 PCR:: NOT DETECTED
CREAT BLD-MCNC: 1.11 MG/DL
EGFRCR SERPLBLD CKD-EPI 2021: 65 ML/MIN/1.73M2 (ref 60–?)
EOSINOPHIL # BLD AUTO: 0.04 X10(3) UL (ref 0–0.7)
EOSINOPHIL NFR BLD AUTO: 0.3 %
ERYTHROCYTE [DISTWIDTH] IN BLOOD BY AUTOMATED COUNT: 13.9 %
FLUAV RNA SPEC QL NAA+PROBE: NOT DETECTED
FLUBV RNA SPEC QL NAA+PROBE: NOT DETECTED
GLOBULIN PLAS-MCNC: 3.5 G/DL (ref 2.8–4.4)
GLUCOSE BLD-MCNC: 111 MG/DL (ref 70–99)
HCT VFR BLD AUTO: 36.2 %
HGB BLD-MCNC: 12.3 G/DL
IMM GRANULOCYTES # BLD AUTO: 0.05 X10(3) UL (ref 0–1)
IMM GRANULOCYTES NFR BLD: 0.4 %
L PNEUMO AG UR QL: NEGATIVE
LYMPHOCYTES # BLD AUTO: 1.61 X10(3) UL (ref 1–4)
LYMPHOCYTES NFR BLD AUTO: 13.1 %
MCH RBC QN AUTO: 28.8 PG (ref 26–34)
MCHC RBC AUTO-ENTMCNC: 34 G/DL (ref 31–37)
MCV RBC AUTO: 84.8 FL
METAPNEUMOVIRUS PCR:: NOT DETECTED
MONOCYTES # BLD AUTO: 1.26 X10(3) UL (ref 0.1–1)
MONOCYTES NFR BLD AUTO: 10.2 %
MYCOPLASMA PNEUMONIA PCR:: NOT DETECTED
NEUTROPHILS # BLD AUTO: 9.32 X10 (3) UL (ref 1.5–7.7)
NEUTROPHILS # BLD AUTO: 9.32 X10(3) UL (ref 1.5–7.7)
NEUTROPHILS NFR BLD AUTO: 75.8 %
OSMOLALITY SERPL CALC.SUM OF ELEC: 288 MOSM/KG (ref 275–295)
P AXIS: 68 DEGREES
PARAINFLUENZA 1 PCR:: NOT DETECTED
PARAINFLUENZA 2 PCR:: NOT DETECTED
PARAINFLUENZA 3 PCR:: NOT DETECTED
PARAINFLUENZA 4 PCR:: NOT DETECTED
PLATELET # BLD AUTO: 148 10(3)UL (ref 150–450)
POTASSIUM SERPL-SCNC: 4 MMOL/L (ref 3.5–5.1)
PROCALCITONIN SERPL-MCNC: 2.54 NG/ML (ref ?–0.16)
PROT SERPL-MCNC: 5.8 G/DL (ref 6.4–8.2)
Q-T INTERVAL: 352 MS
QRS DURATION: 100 MS
QTC CALCULATION (BEZET): 447 MS
R AXIS: 6 DEGREES
RBC # BLD AUTO: 4.27 X10(6)UL
RHINOVIRUS/ENTERO PCR:: NOT DETECTED
RSV RNA SPEC QL NAA+PROBE: NOT DETECTED
SARS-COV-2 RNA NPH QL NAA+NON-PROBE: NOT DETECTED
SODIUM SERPL-SCNC: 138 MMOL/L (ref 136–145)
STREP PNEUMO ANTIGEN, URINE: NEGATIVE
T AXIS: 6 DEGREES
VENTRICULAR RATE: 97 BPM
WBC # BLD AUTO: 12.3 X10(3) UL (ref 4–11)

## 2023-12-28 PROCEDURE — 93971 EXTREMITY STUDY: CPT | Performed by: STUDENT IN AN ORGANIZED HEALTH CARE EDUCATION/TRAINING PROGRAM

## 2023-12-28 PROCEDURE — 99233 SBSQ HOSP IP/OBS HIGH 50: CPT | Performed by: INTERNAL MEDICINE

## 2023-12-28 NOTE — CM/SW NOTE
12/28/23 1500   CM/SW Referral Data   Referral Source Physician   Reason for Referral Discharge planning   Informant EMR;Clinical Staff Member;Spouse/Significant Other       Sw met with pt's wife while pt was down for dopplers to LE. Disussed therapy recs for [facility] and provided her with list of accepting [place]. Wife states she is not sure if they want HHC vs OP. She will discuss with pt and let SW know choice. PT did pt in order for OP in case pt decides to go OP.     Per PT:    HOME SITUATION  Type of Home: House   Home Layout: Multi-level     Lives With: Spouse  Drives: Yes  Patient Owned Equipment: Cane  Patient Regularly Uses: Glasses     Prior Level of [place]: mod I for ambulation - uses SPC on the stairs and RW the rest of the time, hx of falls per EMR    Bozena Phoenix, LCSW  /Discharge Planner

## 2023-12-28 NOTE — PLAN OF CARE
Pleasantly confused, oriented to self, disoriented to place, follows commands, Afebrile, WBC 12.3, heart rate are unstable, tele shows a flutter with PVC's and some pauses, patient has been asymptomatic, Dr Irlanda Pelayo was in, aware of patient's telemetry readings. Will continue to monitor. On IV antibiotics for PNA, denies SOB, no coughing noted. Seen by PT, recommends home PT/OT when discharged. Wife at bedside. Respiratory panel was done, negative for flu and covid  Problem: RESPIRATORY - ADULT  Goal: Achieves optimal ventilation and oxygenation  Description: INTERVENTIONS:  - Assess for changes in respiratory status  - Assess for changes in mentation and behavior  - Position to facilitate oxygenation and minimize respiratory effort  - Oxygen supplementation based on oxygen saturation or ABGs  - Provide Smoking Cessation handout, if applicable  - Encourage broncho-pulmonary hygiene including cough, deep breathe, Incentive Spirometry  - Assess the need for suctioning and perform as needed  - Assess and instruct to report SOB or any respiratory difficulty  - Respiratory Therapy support as indicated  - Manage/alleviate anxiety  - Monitor for signs/symptoms of CO2 retention  Outcome: Progressing     Problem: NEUROLOGICAL - ADULT  Goal: Achieves stable or improved neurological status  Description: INTERVENTIONS  - Assess for and report changes in neurological status  - Initiate measures to prevent increased intracranial pressure  - Maintain blood pressure and fluid volume within ordered parameters to optimize cerebral perfusion and minimize risk of hemorrhage  - Monitor temperature, glucose, and sodium.  Initiate appropriate interventions as ordered  Outcome: Progressing     Problem: Impaired Functional Mobility  Goal: Achieve highest/safest level of mobility/gait  Description: Interventions:  - Assess patient's functional ability and stability  - Promote increasing activity/tolerance for mobility and gait  - Educate and engage patient/family in tolerated activity level and precaution  Outcome: Progressing

## 2023-12-28 NOTE — PHYSICAL THERAPY NOTE
PHYSICAL THERAPY EVALUATION - INPATIENT     Room Number: 430/430-A  Evaluation Date: 12/28/2023  Type of Evaluation: Initial  Physician Order: PT Eval and Treat    Presenting Problem: PNA  Co-Morbidities : CAD, hypertension, hyperlipidemia, CKD, atrial flutter  Reason for Therapy: Mobility Dysfunction and Discharge Planning    History related to current admission: Patient is a 78year old male admitted on 12/27/2023: Presents with weakness, dx septic shock with PNA    ED 10/2: fall, acute head injury      ASSESSMENT   In this PT evaluation, the patient presents with the following impairments 1) Confusion noted, re-oriented throughout session, following commands appropriately- RN aware. 2) Able to ambulate 200ft with RW and CGA - frequently running into objects in the hallway when dual tasking, improved navigation noted with single task focus. These impairments and comorbidities manifest themselves as functional limitations in independent bed mobility, transfers, and gait. The patient is below baseline and would benefit from skilled inpatient PT to address the above deficits to assist patient in returning to prior to level of function. Functional outcome measures completed include AMPAC. The AM-PAC '6-Clicks' Inpatient Basic Mobility Short Form was completed and this patient is demonstrating a Approx Degree of Impairment: 35.83%  degree of impairment in mobility. Research supports that patients with this level of impairment may benefit from HHPT/OT. DISCHARGE RECOMMENDATIONS  PT Discharge Recommendations: Home with home health PT/OT    PLAN  PT Treatment Plan: Bed mobility; Body mechanics; Coordination; Endurance; Energy conservation;Patient education; Family education;Gait training;Neuromuscular re-educate;Range of motion;Strengthening;Stoop training;Stair training;Transfer training;Balance training  Rehab Potential : Good  Frequency (Obs): 3-5x/week  Number of Visits to Meet Established Goals: 5      CURRENT GOALS    Goal #1 Patient is able to demonstrate supine - sit EOB @ level: supervision     Goal #2 Patient is able to demonstrate transfers EOB to/from Chair/Wheelchair at assistance level: supervision     Goal #3 Patient is able to ambulate 150 feet with assist device: walker - rolling at assistance level: supervision     Goal #4 Patient will navigate stairs with rail and SPC and SBA   Goal #5    Goal #6    Goal Comments: Goals established on 2023    HOME SITUATION  Type of Home: House   Home Layout: Multi-level                Lives With: Spouse  Drives: Yes  Patient Owned Equipment: Cane  Patient Regularly Uses: Glasses    Prior Level of [place]: mod I for ambulation - uses SPC on the stairs and RW the rest of the time, hx of falls per EMR    SUBJECTIVE  \"I have to get my medication at [facility] - pt reoriented that he was admitted at [facility]      OBJECTIVE  Precautions: Bed/chair alarm  Fall Risk: Standard fall risk    WEIGHT BEARING RESTRICTION  Weight Bearing Restriction: None                PAIN ASSESSMENT  Ratin          COGNITION  Overall Cognitive Status:  Impaired  Orientation Level:  oriented to person, disoriented to place, disoriented to time, and disoriented to situation  Following Commands:  follows all commands and directions without difficulty    RANGE OF MOTION AND STRENGTH ASSESSMENT  Upper extremity ROM and strength are within functional limits     Lower extremity ROM is within functional limits     Lower extremity strength is within functional limits       BALANCE  Static Sitting: Fair -  Dynamic Sitting: Fair -  Static Standing: Fair -  Dynamic Standing: Fair -    ADDITIONAL TESTS                                    ACTIVITY TOLERANCE           BP: 148/81  BP Location: Left arm  BP Method: Automatic  Patient Position: Sitting    O2 WALK       NEUROLOGICAL FINDINGS                        AM-PAC '6-Clicks' INPATIENT SHORT FORM - BASIC MOBILITY  How much difficulty does the patient currently have. .. Patient Difficulty: Turning over in bed (including adjusting bedclothes, sheets and blankets)?: None   Patient Difficulty: Sitting down on and standing up from a chair with arms (e.g., wheelchair, bedside commode, etc.): A Little   Patient Difficulty: Moving from lying on back to sitting on the side of the bed?: None   How much help from another person does the patient currently need. .. Help from Another: Moving to and from a bed to a chair (including a wheelchair)?: A Little   Help from Another: Need to walk in hospital room?: A Little   Help from Another: Climbing 3-5 steps with a railing?: A Little       AM-PAC Score:  Raw Score: 20   Approx Degree of Impairment: 35.83%   Standardized Score (AM-PAC Scale): 47.67   CMS Modifier (G-Code): CJ    FUNCTIONAL ABILITY STATUS  Gait Assessment   Functional Mobility/Gait Assessment  Gait Assistance: Contact guard assist  Distance (ft): 15,200  Assistive Device: Rolling walker  Pattern: Shuffle    Skilled Therapy Provided     Bed Mobility:  Rolling: mod I  Supine to sit: mod I Pt received verbal and tactile cuing on reaching for bed rail during supine to sit transfer to promote trunk rotation and rolling   Pt received verbal and tactile cuing on scooting anteriorly towards EOB to improve sitting balance with foot support on the floor    Transfer Mobility:  Sit to stand: CGA from bed and toilet    Stand to sit: CGA  Gait = CGA - pt initially with difficulty navigating around obstacles in the [place] while dual tasking, improved with cuing for single task, vc for RW placement closer to patient     Exercise/Education Provided:  Bed mobility  Body mechanics  Functional activity tolerated  Gait training  Transfer training    Patient End of Session: Up in chair;Needs met;Call light within reach;RN aware of session/findings; All patient questions and concerns addressed; Alarm set      Patient Evaluation Complexity Level:  History Low - no personal factors and/or co-morbidities   Examination of body systems Low - addressing 1-2 elements   Clinical Presentation Low - Stable   Clinical Decision Making Low - Stable       PT Session Time: 25 minutes  Gait Training: 10 minutes  Therapeutic Activity: 5 minutes  Neuromuscular Re-education:  minutes  Therapeutic Exercise:  minutes

## 2023-12-28 NOTE — PLAN OF CARE
Received pt a/o x1, pleasantly confused. Reoriented frequently. HR A flutter on tele. VSS. Afebrile. Denies pain. Medication admin per [facility] w/ applesauce. LLE swelling & redness noted- awaiting US doppler. Wife updated on progressing POC, verbalized understanding. Call light within reach. Safety precautions in place. 0345: Telemetry showing irregular rhythm changes between A flutter, SB 40-50s, & NSR. Rhythm nonsustained with 2 second pause between, MD notified.       Problem: CARDIOVASCULAR - ADULT  Goal: Absence of cardiac arrhythmias or at baseline  Description: INTERVENTIONS:  - Continuous cardiac monitoring, monitor vital signs, obtain 12 lead EKG if indicated  - Evaluate effectiveness of antiarrhythmic and heart rate control medications as ordered  - Initiate emergency measures for life threatening arrhythmias  - Monitor electrolytes and administer replacement therapy as ordered  Outcome: Progressing     Problem: RESPIRATORY - ADULT  Goal: Achieves optimal ventilation and oxygenation  Description: INTERVENTIONS:  - Assess for changes in respiratory status  - Assess for changes in mentation and behavior  - Position to facilitate oxygenation and minimize respiratory effort  - Oxygen supplementation based on oxygen saturation or ABGs  - Provide Smoking Cessation handout, if applicable  - Encourage broncho-pulmonary hygiene including cough, deep breathe, Incentive Spirometry  - Assess the need for suctioning and perform as needed  - Assess and instruct to report SOB or any respiratory difficulty  - Respiratory Therapy support as indicated  - Manage/alleviate anxiety  - Monitor for signs/symptoms of CO2 retention  Outcome: Progressing     Problem: NEUROLOGICAL - ADULT  Goal: Achieves stable or improved neurological status  Description: INTERVENTIONS  - Assess for and report changes in neurological status  - Initiate measures to prevent increased intracranial pressure  - Maintain blood pressure and fluid volume within ordered parameters to optimize cerebral perfusion and minimize risk of hemorrhage  - Monitor temperature, glucose, and sodium.  Initiate appropriate interventions as ordered  Outcome: Progressing

## 2023-12-28 NOTE — CM/SW NOTE
Department  notified of request for [facility], aidin referrals started. Assigned CM/SW to follow up with pt/family on further discharge planning.        DEL Marrero [facility]

## 2023-12-29 LAB
ANION GAP SERPL CALC-SCNC: 9 MMOL/L (ref 0–18)
BUN BLD-MCNC: 14 MG/DL (ref 9–23)
CALCIUM BLD-MCNC: 8.6 MG/DL (ref 8.5–10.1)
CHLORIDE SERPL-SCNC: 110 MMOL/L (ref 98–112)
CO2 SERPL-SCNC: 21 MMOL/L (ref 21–32)
CREAT BLD-MCNC: 0.9 MG/DL
EGFRCR SERPLBLD CKD-EPI 2021: 84 ML/MIN/1.73M2 (ref 60–?)
ERYTHROCYTE [DISTWIDTH] IN BLOOD BY AUTOMATED COUNT: 13.5 %
GLUCOSE BLD-MCNC: 110 MG/DL (ref 70–99)
HCT VFR BLD AUTO: 42.2 %
HGB BLD-MCNC: 14.2 G/DL
MCH RBC QN AUTO: 28.7 PG (ref 26–34)
MCHC RBC AUTO-ENTMCNC: 33.6 G/DL (ref 31–37)
MCV RBC AUTO: 85.3 FL
OSMOLALITY SERPL CALC.SUM OF ELEC: 291 MOSM/KG (ref 275–295)
PLATELET # BLD AUTO: 159 10(3)UL (ref 150–450)
POTASSIUM SERPL-SCNC: 3.3 MMOL/L (ref 3.5–5.1)
PROCALCITONIN SERPL-MCNC: 1.24 NG/ML (ref ?–0.16)
RBC # BLD AUTO: 4.95 X10(6)UL
SODIUM SERPL-SCNC: 140 MMOL/L (ref 136–145)
WBC # BLD AUTO: 10.1 X10(3) UL (ref 4–11)

## 2023-12-29 PROCEDURE — 99232 SBSQ HOSP IP/OBS MODERATE 35: CPT | Performed by: INTERNAL MEDICINE

## 2023-12-29 RX ORDER — POTASSIUM CHLORIDE 20 MEQ/1
40 TABLET, EXTENDED RELEASE ORAL ONCE
Status: COMPLETED | OUTPATIENT
Start: 2023-12-29 | End: 2023-12-29

## 2023-12-29 RX ORDER — CEFDINIR 300 MG/1
300 CAPSULE ORAL 2 TIMES DAILY
Qty: 10 CAPSULE | Refills: 0 | Status: SHIPPED | OUTPATIENT
Start: 2023-12-29 | End: 2024-01-03

## 2023-12-29 RX ORDER — FUROSEMIDE 20 MG/1
20 TABLET ORAL
Status: SHIPPED | COMMUNITY
Start: 2023-12-29

## 2023-12-29 RX ORDER — METOPROLOL SUCCINATE 25 MG/1
25 TABLET, EXTENDED RELEASE ORAL 2 TIMES DAILY
Qty: 60 TABLET | Refills: 1 | Status: SHIPPED | OUTPATIENT
Start: 2023-12-29

## 2023-12-29 RX ORDER — METOPROLOL SUCCINATE 25 MG/1
25 TABLET, EXTENDED RELEASE ORAL
Status: DISCONTINUED | OUTPATIENT
Start: 2023-12-29 | End: 2023-12-30

## 2023-12-29 NOTE — HOME CARE LIAISON
Received referral via [facility] for Home Health services. Spoke w/ patient and wife at the bedside and are agreeable for home health care. Updated AVS with contact information.

## 2023-12-29 NOTE — PLAN OF CARE
Pt received a/o x1, pleasantly confused. VSS. Afebrile. Denies pain. Medication admin per [facility]. HR NSR w/ PVC on tele. Sores/ scabs noted on bilateral plantar feet. Photos placed into chart. Wife updated on progressing POC. Reoriented frequently. Call light within reach. Safety precautions in place. Problem: NEUROLOGICAL - ADULT  Goal: Achieves stable or improved neurological status  Description: INTERVENTIONS  - Assess for and report changes in neurological status  - Initiate measures to prevent increased intracranial pressure  - Maintain blood pressure and fluid volume within ordered parameters to optimize cerebral perfusion and minimize risk of hemorrhage  - Monitor temperature, glucose, and sodium.  Initiate appropriate interventions as ordered  Outcome: Progressing     Problem: RESPIRATORY - ADULT  Goal: Achieves optimal ventilation and oxygenation  Description: INTERVENTIONS:  - Assess for changes in respiratory status  - Assess for changes in mentation and behavior  - Position to facilitate oxygenation and minimize respiratory effort  - Oxygen supplementation based on oxygen saturation or ABGs  - Provide Smoking Cessation handout, if applicable  - Encourage broncho-pulmonary hygiene including cough, deep breathe, Incentive Spirometry  - Assess the need for suctioning and perform as needed  - Assess and instruct to report SOB or any respiratory difficulty  - Respiratory Therapy support as indicated  - Manage/alleviate anxiety  - Monitor for signs/symptoms of CO2 retention  Outcome: Progressing

## 2023-12-29 NOTE — CM/SW NOTE
12/29/23 1300   Choice of Post-Acute Provider   Informed patient of right to choose their preferred provider Yes   List of appropriate post-acute services provided to patient/family with quality data Yes   Patient/family choice Residential   Information given to Spouse/Significant other   [facility]idential [facility]ospice financial disclosure given Yes     Sw met with pt and wife - they have decided upon [facility] [place] and chose [facility]. Reserved in [place].     Ar Steinberg LCSW  /Discharge Planner

## 2023-12-29 NOTE — DISCHARGE INSTRUCTIONS
Please follow up with PCP in 1 week after discharge. Have your chest xray repeated to ensure improvement of pneumonia. Sometimes managing your health at home requires assistance. The [facility] team has recognized your preference to use [facility]. They can be reached by phone at [phone]. The fax number for your reference is [phone]. A representative from the home health agency will contact you or your family to schedule your first visit.

## 2023-12-30 VITALS
HEART RATE: 77 BPM | WEIGHT: 207 LBS | OXYGEN SATURATION: 94 % | DIASTOLIC BLOOD PRESSURE: 84 MMHG | TEMPERATURE: 97 F | BODY MASS INDEX: 27 KG/M2 | RESPIRATION RATE: 16 BRPM | SYSTOLIC BLOOD PRESSURE: 192 MMHG

## 2023-12-30 LAB
ANION GAP SERPL CALC-SCNC: 9 MMOL/L (ref 0–18)
BUN BLD-MCNC: 13 MG/DL (ref 9–23)
CALCIUM BLD-MCNC: 8.9 MG/DL (ref 8.5–10.1)
CHLORIDE SERPL-SCNC: 111 MMOL/L (ref 98–112)
CO2 SERPL-SCNC: 20 MMOL/L (ref 21–32)
CREAT BLD-MCNC: 0.9 MG/DL
EGFRCR SERPLBLD CKD-EPI 2021: 84 ML/MIN/1.73M2 (ref 60–?)
ERYTHROCYTE [DISTWIDTH] IN BLOOD BY AUTOMATED COUNT: 13.3 %
GLUCOSE BLD-MCNC: 112 MG/DL (ref 70–99)
HCT VFR BLD AUTO: 43.1 %
HGB BLD-MCNC: 14.5 G/DL
MCH RBC QN AUTO: 28.5 PG (ref 26–34)
MCHC RBC AUTO-ENTMCNC: 33.6 G/DL (ref 31–37)
MCV RBC AUTO: 84.8 FL
OSMOLALITY SERPL CALC.SUM OF ELEC: 291 MOSM/KG (ref 275–295)
PLATELET # BLD AUTO: 226 10(3)UL (ref 150–450)
POTASSIUM SERPL-SCNC: 3.5 MMOL/L (ref 3.5–5.1)
POTASSIUM SERPL-SCNC: 3.5 MMOL/L (ref 3.5–5.1)
PROCALCITONIN SERPL-MCNC: 0.87 NG/ML (ref ?–0.16)
RBC # BLD AUTO: 5.08 X10(6)UL
SODIUM SERPL-SCNC: 140 MMOL/L (ref 136–145)
WBC # BLD AUTO: 8.7 X10(3) UL (ref 4–11)

## 2023-12-30 PROCEDURE — 99239 HOSP IP/OBS DSCHRG MGMT >30: CPT | Performed by: INTERNAL MEDICINE

## 2023-12-30 NOTE — PROGRESS NOTES
No change , calm and cooperative at start of shift. . having episodes of bradycardia but none sustained. Towards midnight, became agitated and confused about time, wanted to get dressed and go home, was combative with staff. Returned to bed , he refused tele. Settled and slept. Would not wear gown. 6am calmer and more orientated, bp elevated , heart rate 88. Metoprolol given. Tele reapplied

## 2023-12-30 NOTE — PLAN OF CARE
Pt. A&O x 2, confused, does follow commands. Irregular HR, can be vincent at times in the low 40's. MD aware. IV antibiotics continued. Pt. Able to ambulate with standby assist. Fall precautions in place. Call light within reach. Plan for discharge tomorrow. Pt. And wife updated on POC.     Problem: CARDIOVASCULAR - ADULT  Goal: Absence of cardiac arrhythmias or at baseline  Description: INTERVENTIONS:  - Continuous cardiac monitoring, monitor vital signs, obtain 12 lead EKG if indicated  - Evaluate effectiveness of antiarrhythmic and heart rate control medications as ordered  - Initiate emergency measures for life threatening arrhythmias  - Monitor electrolytes and administer replacement therapy as ordered  Outcome: Progressing     Problem: RESPIRATORY - ADULT  Goal: Achieves optimal ventilation and oxygenation  Description: INTERVENTIONS:  - Assess for changes in respiratory status  - Assess for changes in mentation and behavior  - Position to facilitate oxygenation and minimize respiratory effort  - Oxygen supplementation based on oxygen saturation or ABGs  - Provide Smoking Cessation handout, if applicable  - Encourage broncho-pulmonary hygiene including cough, deep breathe, Incentive Spirometry  - Assess the need for suctioning and perform as needed  - Assess and instruct to report SOB or any respiratory difficulty  - Respiratory Therapy support as indicated  - Manage/alleviate anxiety  - Monitor for signs/symptoms of CO2 retention  Outcome: Progressing     Problem: METABOLIC/FLUID AND ELECTROLYTES - ADULT  Goal: Hemodynamic stability and optimal renal function maintained  Description: INTERVENTIONS:  - Monitor labs and assess for signs and symptoms of volume excess or deficit  - Monitor intake, output and patient weight  - Monitor urine specific gravity, serum osmolarity and serum sodium as indicated or ordered  - Monitor response to interventions for patient's volume status, including labs, urine output, blood pressure (other measures as available)  - Encourage oral intake as appropriate  - Instruct patient on fluid and nutrition restrictions as appropriate  Outcome: Progressing     Problem: NEUROLOGICAL - ADULT  Goal: Achieves stable or improved neurological status  Description: INTERVENTIONS  - Assess for and report changes in neurological status  - Initiate measures to prevent increased intracranial pressure  - Maintain blood pressure and fluid volume within ordered parameters to optimize cerebral perfusion and minimize risk of hemorrhage  - Monitor temperature, glucose, and sodium.  Initiate appropriate interventions as ordered  Outcome: Progressing

## 2023-12-30 NOTE — PLAN OF CARE
Patient is A/O x3 with occasional confusion. VSS. Afebrile. No complaints of pain. Room air. Ambulates with assist and a walker. Cardiac diet; tolerated well. Voids. All medications given per [facility]. IV abx infused per order. Safety precautions in place. Call light within reach. Patient has been cleared for discharge. [facility] to follow up. Reviewed discharge instructions with patient and wife. Both verbalized understanding. NURSING DISCHARGE NOTE    Discharged Home via Wheelchair. Accompanied by Family member and Support staff  Belongings Taken by patient/family.     Problem: CARDIOVASCULAR - ADULT  Goal: Absence of cardiac arrhythmias or at baseline  Description: INTERVENTIONS:  - Continuous cardiac monitoring, monitor vital signs, obtain 12 lead EKG if indicated  - Evaluate effectiveness of antiarrhythmic and heart rate control medications as ordered  - Initiate emergency measures for life threatening arrhythmias  - Monitor electrolytes and administer replacement therapy as ordered  Outcome: Progressing     Problem: METABOLIC/FLUID AND ELECTROLYTES - ADULT  Goal: Hemodynamic stability and optimal renal function maintained  Description: INTERVENTIONS:  - Monitor labs and assess for signs and symptoms of volume excess or deficit  - Monitor intake, output and patient weight  - Monitor urine specific gravity, serum osmolarity and serum sodium as indicated or ordered  - Monitor response to interventions for patient's volume status, including labs, urine output, blood pressure (other measures as available)  - Encourage oral intake as appropriate  - Instruct patient on fluid and nutrition restrictions as appropriate  Outcome: Progressing     Problem: NEUROLOGICAL - ADULT  Goal: Achieves stable or improved neurological status  Description: INTERVENTIONS  - Assess for and report changes in neurological status  - Initiate measures to prevent increased intracranial pressure  - Maintain blood pressure and fluid volume within ordered parameters to optimize cerebral perfusion and minimize risk of hemorrhage  - Monitor temperature, glucose, and sodium.  Initiate appropriate interventions as ordered  Outcome: Progressing

## 2024-01-02 ENCOUNTER — TELEPHONE (OUTPATIENT)
Dept: INTERNAL MEDICINE CLINIC | Facility: CLINIC | Age: 84
End: 2024-01-02

## 2024-01-02 ENCOUNTER — PATIENT OUTREACH (OUTPATIENT)
Dept: CASE MANAGEMENT | Age: 84
End: 2024-01-02

## 2024-01-02 DIAGNOSIS — Z02.9 ENCOUNTERS FOR UNSPECIFIED ADMINISTRATIVE PURPOSE: Primary | ICD-10-CM

## 2024-01-02 PROCEDURE — 1111F DSCHRG MED/CURRENT MED MERGE: CPT

## 2024-01-02 NOTE — PROGRESS NOTES
NCM attempted to contact the patient for HFU. Phone rang, appeared to be answered and then disconnected. NCM will try again another time.

## 2024-01-02 NOTE — TELEPHONE ENCOUNTER
Spoke with spouse, advised to have pt take probiotics OTC or eat yogurt twice daily, advised drink plenty of the fluids/gatorade to prevent dehydration  Voiced understanding

## 2024-01-02 NOTE — TELEPHONE ENCOUNTER
Pt's spouse have questions regarding medications from ER visit and side effects and directions.     Ph : [phone]

## 2024-01-05 ENCOUNTER — OFFICE VISIT (OUTPATIENT)
Dept: INTERNAL MEDICINE CLINIC | Facility: CLINIC | Age: 84
End: 2024-01-05
Payer: MEDICARE

## 2024-01-05 VITALS
OXYGEN SATURATION: 98 % | SYSTOLIC BLOOD PRESSURE: 124 MMHG | DIASTOLIC BLOOD PRESSURE: 70 MMHG | RESPIRATION RATE: 18 BRPM | TEMPERATURE: 98 F | HEART RATE: 88 BPM

## 2024-01-05 DIAGNOSIS — A41.9 SEPTIC SHOCK (HCC): ICD-10-CM

## 2024-01-05 DIAGNOSIS — R65.21 SEPTIC SHOCK (HCC): ICD-10-CM

## 2024-01-05 DIAGNOSIS — Z09 HOSPITAL DISCHARGE FOLLOW-UP: Primary | ICD-10-CM

## 2024-01-05 DIAGNOSIS — J18.9 COMMUNITY ACQUIRED PNEUMONIA OF LEFT LOWER LOBE OF LUNG: ICD-10-CM

## 2024-01-05 PROBLEM — E87.20 LACTIC ACIDOSIS: Status: RESOLVED | Noted: 2023-12-27 | Resolved: 2024-01-05

## 2024-01-05 PROCEDURE — 1111F DSCHRG MED/CURRENT MED MERGE: CPT | Performed by: INTERNAL MEDICINE

## 2024-01-05 PROCEDURE — 99496 TRANSJ CARE MGMT HIGH F2F 7D: CPT | Performed by: INTERNAL MEDICINE

## 2024-01-05 PROCEDURE — 1126F AMNT PAIN NOTED NONE PRSNT: CPT | Performed by: INTERNAL MEDICINE

## 2024-01-05 NOTE — PROGRESS NOTES
Subjective:   Nick Kemp is a 83 year old male who presents for hospital follow up.   He was discharged from [facility] to Home or Self Care  Admission Date: 12/27/23   Discharge Date: 12/30/23  Hospital Discharge Diagnosis: community acquired pneumonia    Interactive contact within 2 business days post discharge first initiated on Date: 1/2/2024    During the visit, the following was completed:  Obtained and reviewed   Reviewed     HPI:   Date of Admission: 12/27/2023  Date of Discharge:   12/30/2023          A/P:  Severe sepsis with lactic acidosis suspect secondary to gram-negative pneumonia- resolved. Abx completed    Altered mental status suspect metabolic encephalopathy with underlying dementia- resolved. Back to baseline    Asymmetric lower extremity edema- improved with diuresis    Hypertension-controlled. Cont current med therapy    Hyperlipidemia-controlled. Cont current med therapy    Atrial flutter- rate controlled     History of Present Illness:         83 year old male with past medical history of CAD, hypertension, hyperlipidemia, CKD, atrial flutter who presented ED for weakness.     Found to have Severe sepsis with lactic acidosis 2/2 gram-negative pneumonia  Patient presented with weakness, rigors. WBC 30 on admission with lactic acidosis. CXR with mild interstitial and airspace opacities in the left lung base atelectasis vs. Pneumonia. COVID-19 and flu screen negative. Urine strep and legionella negative. Urine culture is negative. Procal 2.54. RPAN negative.  Blood cultures were negative today.  Patient was treated with IV ceftriaxone and completed course of azithromycin during admission.  He was transition to finish another 5 days of cefdinir which he has done    PAST MEDICAL, SOCIAL, FAMILY HISTORIES REVIEWED WITH PT       .    History/Other:   Current Medications:  Medication Reconciliation:  I am aware of an inpatient discharge within the last 30 days.  The discharge medication list  has been reconciled with the patient's current medication list and reviewed by me.  See medication list for additions of new medication, and changes to current doses of medications and discontinued medications.  Outpatient Medications Marked as Taking for the 1/5/24 encounter (Office Visit) with Toby Pantoja MD   Medication Sig    furosemide 20 MG Oral Tab Take 1 tablet (20 mg total) by mouth daily as needed (leg swelling).    metoprolol succinate ER 25 MG Oral Tablet 24 Hr Take 1 tablet (25 mg total) by mouth 2 (two) times daily.    HYDRALAZINE 100 MG Oral Tab TAKE 1 TABLET BY MOUTH TWICE A DAY (Patient taking differently: 0.5 tablets (50 mg total) 2 (two) times daily.)    ATORVASTATIN 10 MG Oral Tab TAKE 1 TABLET BY MOUTH EVERY DAY (Patient taking differently: 1 tablet (10 mg total) nightly.)    acetaminophen 325 MG Oral Tab Take 1 tablet (325 mg total) by mouth in the morning and 1 tablet (325 mg total) before bedtime. 1 in the morning and 1 at night.    rivaroxaban (XARELTO) 20 MG Oral Tab TAKE 1 TABLET BY MOUTH EVERY DAY WITH FOOD (Patient taking differently: at bedtime. TAKE 1 TABLET BY MOUTH EVERY DAY WITH FOOD)    famotidine 10 MG Oral Tab Take 1 tablet (10 mg total) by mouth at bedtime.       Review of Systems  A comprehensive 10 point review of systems was completed.     Pertinent positives and negatives noted in the HPI.      Objective:   Physical Exam  General: No acute distress. Alert and oriented x 3.  HEENT: Normocephalic atraumatic. Moist mucous membranes. EOM-I. PERRLA. Anicteric.  Neck: No lymphadenopathy.   Respiratory: Clear to auscultation bilaterally. No wheezes. No rhonchi.  Cardiovascular: S1, S2. Regular rate and rhythm.   Abdomen: Soft,  no pain.  nontender, nondistended.  Positive bowel sounds.   Neurologic: No focal neurological deficits.   Musculoskeletal: Moves all extremities.  Extremities: No edema or cyanosis.  Integument: No rashes or lesions.        /70   Pulse 88   Temp  97.8 °F (36.6 °C)   Resp 18   SpO2 98%  Estimated body mass index is 26.58 kg/m² as calculated from the following:    Height as of 12/11/23: 6' 2\" (1.88 m).    Weight as of 12/27/23: 207 lb (93.9 kg).    Assessment & Plan:   There are no diagnoses linked to this encounter.      No follow-ups on file.

## 2024-01-08 NOTE — PROGRESS NOTES
Multiple attempts to reach pt and messages left with no return call.  Patient went in for HFU appt with PCP on 1/5/24.  Encounter closing.

## 2024-01-11 DIAGNOSIS — E78.2 MIXED HYPERLIPIDEMIA: ICD-10-CM

## 2024-01-11 DIAGNOSIS — I48.92 ATRIAL FLUTTER, PAROXYSMAL (HCC): ICD-10-CM

## 2024-01-11 DIAGNOSIS — Z85.828 PERSONAL HISTORY OF OTHER MALIGNANT NEOPLASM OF SKIN: ICD-10-CM

## 2024-01-11 DIAGNOSIS — I10 ESSENTIAL HYPERTENSION, BENIGN: ICD-10-CM

## 2024-01-11 RX ORDER — ATORVASTATIN CALCIUM 10 MG/1
TABLET, FILM COATED ORAL
Qty: 90 TABLET | Refills: 1 | Status: SHIPPED | OUTPATIENT
Start: 2024-01-11

## 2024-01-11 NOTE — TELEPHONE ENCOUNTER
Last time medication was refilled 05/01/2023  Quantity and # of refills 90 W 1  Last OV 01/05/2024  Next OV   Future Appointments   Date Time Provider Department Center   2/28/2024  4:15 PM Apolinar Covarrubias DPM [id number]   5/13/2024  1:00 PM Toby Pantoja MD [facility] [address]     Passed protocol, Rx sent.

## 2024-01-21 DIAGNOSIS — I10 ESSENTIAL HYPERTENSION: ICD-10-CM

## 2024-01-22 RX ORDER — METOPROLOL SUCCINATE 25 MG/1
25 TABLET, EXTENDED RELEASE ORAL 2 TIMES DAILY
Qty: 180 TABLET | Refills: 1 | Status: SHIPPED | OUTPATIENT
Start: 2024-01-22

## 2024-01-22 RX ORDER — HYDRALAZINE HYDROCHLORIDE 100 MG/1
TABLET, FILM COATED ORAL
Qty: 180 TABLET | Refills: 1 | Status: SHIPPED | OUTPATIENT
Start: 2024-01-22

## 2024-01-22 NOTE — TELEPHONE ENCOUNTER
Hydralazine 100 mg  Last time medication was refilled 7/17/23  Quantity and # of refills 180 w 0  Last OV 1/5/24  Next OV 5/13/24  Metoprolol 25 mg  Last time medication was refilled 12/29/23  Quantity and # of refills 60 w 1  Last OV 1/5/24  Next OV 5/13/24  Passed protocol, Rx sent.

## 2024-02-05 NOTE — TELEPHONE ENCOUNTER
I reviewed the H&P written for preoperative evaluation by Dr. Rod on 1/29/2024.  I examined the patient, and there are no changes in the patient's condition.     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

## 2024-02-28 ENCOUNTER — OFFICE VISIT (OUTPATIENT)
Dept: PODIATRY CLINIC | Facility: CLINIC | Age: 84
End: 2024-02-28

## 2024-02-28 DIAGNOSIS — N18.30 STAGE 3 CHRONIC KIDNEY DISEASE, UNSPECIFIED WHETHER STAGE 3A OR 3B CKD (HCC): ICD-10-CM

## 2024-02-28 DIAGNOSIS — L84 PRE-ULCERATIVE CALLUSES: ICD-10-CM

## 2024-02-28 DIAGNOSIS — M20.42 HAMMER TOES OF BOTH FEET: ICD-10-CM

## 2024-02-28 DIAGNOSIS — M79.674 TOE PAIN, BILATERAL: ICD-10-CM

## 2024-02-28 DIAGNOSIS — M77.41 METATARSALGIA OF BOTH FEET: ICD-10-CM

## 2024-02-28 DIAGNOSIS — M20.41 HAMMER TOES OF BOTH FEET: ICD-10-CM

## 2024-02-28 DIAGNOSIS — M77.42 METATARSALGIA OF BOTH FEET: ICD-10-CM

## 2024-02-28 DIAGNOSIS — L84 FOOT CALLUS: ICD-10-CM

## 2024-02-28 DIAGNOSIS — B35.1 ONYCHOMYCOSIS: Primary | ICD-10-CM

## 2024-02-28 DIAGNOSIS — M79.675 TOE PAIN, BILATERAL: ICD-10-CM

## 2024-02-28 PROCEDURE — 99213 OFFICE O/P EST LOW 20 MIN: CPT | Performed by: STUDENT IN AN ORGANIZED HEALTH CARE EDUCATION/TRAINING PROGRAM

## 2024-02-29 NOTE — PROGRESS NOTES
[facility] Podiatry  Progress Note    Nick Kemp is a 83 year old male.   Chief Complaint   Patient presents with    Toenail Care     Nail care and foot check          HPI:     Patient is a pleasant 83-year-old male with past medical history of CKD who presents to clinic for foot exam.  He has elongated nails and thickened calluses he has difficulty trimming on his own.  He also has preulcerative calluses to his plantar feet that start to cause pain at times.  He continues to ambulate with supportive new balance tennis shoes.  No other complaints are mentioned at this time. Past medical history, medications, and allergies reviewed.        Allergies: Patient has no known allergies.   Current Outpatient Medications   Medication Sig Dispense Refill    HYDRALAZINE 100 MG Oral Tab TAKE 1 TABLET BY MOUTH TWICE A  tablet 1    metoprolol succinate ER 25 MG Oral Tablet 24 Hr Take 1 tablet (25 mg total) by mouth in the morning and 1 tablet (25 mg total) before bedtime. 180 tablet 1    ATORVASTATIN 10 MG Oral Tab TAKE 1 TABLET BY MOUTH EVERY DAY 90 tablet 1    furosemide 20 MG Oral Tab Take 1 tablet (20 mg total) by mouth daily as needed (leg swelling).      metoprolol succinate ER 25 MG Oral Tablet 24 Hr Take 1 tablet (25 mg total) by mouth 2 (two) times daily. 60 tablet 1    acetaminophen 325 MG Oral Tab Take 1 tablet (325 mg total) by mouth in the morning and 1 tablet (325 mg total) before bedtime. 1 in the morning and 1 at night.      rivaroxaban (XARELTO) 20 MG Oral Tab TAKE 1 TABLET BY MOUTH EVERY DAY WITH FOOD (Patient taking differently: at bedtime. TAKE 1 TABLET BY MOUTH EVERY DAY WITH FOOD) 90 tablet 3    famotidine 10 MG Oral Tab Take 1 tablet (10 mg total) by mouth at bedtime.        Past Medical History:   Diagnosis Date    Anesthesia complication     Aortic insufficiency 4/7/2014    Arrhythmia     Atrial flutter (HCC)     Bad breath     possibly d/t GERD    Cancer (HCC)     basal cell skin  cancer    Cataract     Cholelithiasis     Coronary atherosclerosis of unspecified type of vessel, native or graft     Difficult intubation 93    Dr. Sol gave letter - will bring with him    DVT (deep venous thrombosis) (HCC) 2014    Earache     secondary to TMJ    Esophageal reflux     Hearing impairment     HEART ATTACK     HIGH BLOOD PRESSURE     HIGH CHOLESTEROL     Hypercholesterolemia 2014    Long term (current) use of anticoagulants 2014    Nephrotic syndrome     Other and unspecified hyperlipidemia     PVC's (premature ventricular contractions) 2014    Renal disorder     Shortness of breath     Syncope 2014    Testicular pain     likely from under treated epididymitis    Unspecified essential hypertension     Visual impairment     glasses      Past Surgical History:   Procedure Laterality Date    CABG      triple bypass    CATARACT      CATH PERCUTANEOUS  TRANSLUMINAL CORONARY ANGIOPLASTY      early      CHOLECYSTECTOMY      COLONOSCOPY  2004, 2011    x2    OPEN HEART SURGERY (PBP)      SKIN SURGERY Right 14    Excision for BCC, nodular to right upper back, [place]    SKIN SURGERY Left 14    MMS for BCC-nod to the L Nasal Supratip    SKIN SURGERY  16    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    SPLINT, HAMMER TOE      TONSILLECTOMY        Family History   Problem Relation Age of Onset    Heart Disease Father     Other (Jacqueline Gehrig's disease) Brother       Social History     Socioeconomic History    Marital status:     Number of children: 1   Occupational History    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    Tobacco Use    Smoking status: Former     Types: Cigarettes     Quit date: 1976     Years since quittin.6    Smokeless tobacco: Never   Vaping Use    Vaping Use: Never used   Substance and Sexual Activity    Alcohol use: Yes     Comment: occasionally    Drug use: No   Other Topics Concern    Caffeine Concern Yes     Comment: 4-5 coffees,  tea once a day    Exercise No           REVIEW OF SYSTEMS:     Today reviewed systems as documented below  GENERAL HEALTH: feels well otherwise  SKIN: denies any unusual skin lesions or rashes  RESPIRATORY: denies shortness of breath with exertion  CARDIOVASCULAR: denies chest pain on exertion  GI: denies abdominal pain and denies heartburn  NEURO: denies headaches  MUSCULO: denies arthritis, back pain      EXAM:   There were no vitals taken for this visit.  GENERAL: well developed, well nourished, in no apparent distress  EXTREMITIES:   1. Integument: Normal skin temperature and turgor.  Nails x10 are elongated, thickened, dystrophic, and with subungual debris.  HPK that is pre ulcerative noted subfirst, third, and fifth metatarsal head bilaterally.  2. Vascular: Pedal pulses are palpable, capillary refill normal.   3. Musculoskeletal: All muscle groups are graded 5 out of 5 in the foot and ankle.  Flexion contracture of lesser digits appreciated.  Prominent metatarsal heads noted bilaterally.   4. Neurological: Normal sharp dull sensation; reflexes normal.          ASSESSMENT AND PLAN:   Diagnoses and all orders for this visit:    Onychomycosis    Pre-ulcerative calluses    Hammer toes of both feet    Metatarsalgia of both feet    Toe pain, bilateral    Stage 3 chronic kidney disease, unspecified whether stage 3a or 3b CKD (HCC)    Foot callus        Plan:     -Patient examined, chart history reviewed.  -Discussed importance of proper pedal hygiene, regular foot checks, and tight glucose control.  -Sharply debrided nails x10 with a sterile nail nipper achieving a 20% reduction in thickness and length, without incident. Nails further smoothed with dremel.  -Pared HPKs x4 with #15 blade to healthy tissue without incident.  Patient to apply urea cream to site between visits to prevent buildup and ambulate with supportive shoes and avoid walking barefoot.  -Educated patient on acute signs of infection advised patient  to seek immediate medical attention if symptoms arise.    The patient indicates understanding of these issues and agrees to the plan.    RTC 2 months.    Apolinar Covarrubias DPM

## 2024-04-30 DIAGNOSIS — I10 ESSENTIAL HYPERTENSION: ICD-10-CM

## 2024-04-30 RX ORDER — METOPROLOL SUCCINATE 25 MG/1
25 TABLET, EXTENDED RELEASE ORAL 2 TIMES DAILY
Qty: 180 TABLET | Refills: 1 | Status: SHIPPED | OUTPATIENT
Start: 2024-04-30 | End: 2024-05-05

## 2024-04-30 NOTE — TELEPHONE ENCOUNTER
Last time medication was refilled 01/22/2024  Last OV 01/05/2024  Next OV due/scheduled   Future Appointments   Date Time Provider Department Center   5/8/2024  4:15 PM Apolinar Covarrubias DPM [id number]   5/13/2024  1:00 PM Toby Pantoja MD [facility] [address]       Passed protocol, Rx sent.

## 2024-05-02 ENCOUNTER — APPOINTMENT (OUTPATIENT)
Dept: GENERAL RADIOLOGY | Facility: HOSPITAL | Age: 84
DRG: 871 | End: 2024-05-02
Attending: EMERGENCY MEDICINE
Payer: MEDICARE

## 2024-05-02 ENCOUNTER — APPOINTMENT (OUTPATIENT)
Dept: GENERAL RADIOLOGY | Facility: HOSPITAL | Age: 84
End: 2024-05-02
Attending: EMERGENCY MEDICINE
Payer: MEDICARE

## 2024-05-02 ENCOUNTER — TELEPHONE (OUTPATIENT)
Dept: INTERNAL MEDICINE CLINIC | Facility: CLINIC | Age: 84
End: 2024-05-02

## 2024-05-02 ENCOUNTER — HOSPITAL ENCOUNTER (INPATIENT)
Facility: HOSPITAL | Age: 84
LOS: 2 days | Discharge: HOME | End: 2024-05-05
Attending: EMERGENCY MEDICINE | Admitting: HOSPITALIST
Payer: MEDICARE

## 2024-05-02 ENCOUNTER — APPOINTMENT (OUTPATIENT)
Dept: CT IMAGING | Facility: HOSPITAL | Age: 84
End: 2024-05-02
Attending: EMERGENCY MEDICINE
Payer: MEDICARE

## 2024-05-02 ENCOUNTER — HOSPITAL ENCOUNTER (INPATIENT)
Facility: HOSPITAL | Age: 84
LOS: 2 days | Discharge: HOME HEALTH | DRG: 871 | End: 2024-05-05
Attending: EMERGENCY MEDICINE | Admitting: HOSPITALIST
Payer: MEDICARE

## 2024-05-02 ENCOUNTER — HOSPITAL ENCOUNTER (INPATIENT)
Facility: HOSPITAL | Age: 84
LOS: 2 days | Discharge: HOME | DRG: 871 | End: 2024-05-05
Attending: EMERGENCY MEDICINE | Admitting: HOSPITALIST
Payer: MEDICARE

## 2024-05-02 ENCOUNTER — APPOINTMENT (OUTPATIENT)
Dept: CT IMAGING | Facility: HOSPITAL | Age: 84
DRG: 871 | End: 2024-05-02
Attending: EMERGENCY MEDICINE
Payer: MEDICARE

## 2024-05-02 DIAGNOSIS — A41.9 SEPSIS DUE TO URINARY TRACT INFECTION (HCC): Primary | ICD-10-CM

## 2024-05-02 DIAGNOSIS — N39.0 SEPSIS DUE TO URINARY TRACT INFECTION (HCC): Primary | ICD-10-CM

## 2024-05-02 LAB
ALBUMIN SERPL-MCNC: 3.1 G/DL (ref 3.4–5)
ALBUMIN/GLOB SERPL: 0.7 {RATIO} (ref 1–2)
ALP LIVER SERPL-CCNC: 77 U/L
ALT SERPL-CCNC: 20 U/L
ANION GAP SERPL CALC-SCNC: 6 MMOL/L (ref 0–18)
AST SERPL-CCNC: 20 U/L (ref 15–37)
BASOPHILS # BLD AUTO: 0.07 X10(3) UL (ref 0–0.2)
BASOPHILS NFR BLD AUTO: 0.2 %
BILIRUB SERPL-MCNC: 1.8 MG/DL (ref 0.1–2)
BILIRUB UR QL STRIP.AUTO: NEGATIVE
BUN BLD-MCNC: 20 MG/DL (ref 9–23)
CALCIUM BLD-MCNC: 9.6 MG/DL (ref 8.5–10.1)
CHLORIDE SERPL-SCNC: 105 MMOL/L (ref 98–112)
CO2 SERPL-SCNC: 24 MMOL/L (ref 21–32)
CREAT BLD-MCNC: 1.33 MG/DL
EGFRCR SERPLBLD CKD-EPI 2021: 52 ML/MIN/1.73M2 (ref 60–?)
EOSINOPHIL # BLD AUTO: 0 X10(3) UL (ref 0–0.7)
EOSINOPHIL NFR BLD AUTO: 0 %
ERYTHROCYTE [DISTWIDTH] IN BLOOD BY AUTOMATED COUNT: 14.2 %
GLOBULIN PLAS-MCNC: 4.3 G/DL (ref 2.8–4.4)
GLUCOSE BLD-MCNC: 118 MG/DL (ref 70–99)
GLUCOSE UR STRIP.AUTO-MCNC: NORMAL MG/DL
HCT VFR BLD AUTO: 49.3 %
HGB BLD-MCNC: 16 G/DL
IMM GRANULOCYTES # BLD AUTO: 0.38 X10(3) UL (ref 0–1)
IMM GRANULOCYTES NFR BLD: 1.3 %
KETONES UR STRIP.AUTO-MCNC: NEGATIVE MG/DL
LEUKOCYTE ESTERASE UR QL STRIP.AUTO: 500
LYMPHOCYTES # BLD AUTO: 1.71 X10(3) UL (ref 1–4)
LYMPHOCYTES NFR BLD AUTO: 5.6 %
MCH RBC QN AUTO: 28.4 PG (ref 26–34)
MCHC RBC AUTO-ENTMCNC: 32.5 G/DL (ref 31–37)
MCV RBC AUTO: 87.4 FL
MONOCYTES # BLD AUTO: 2.79 X10(3) UL (ref 0.1–1)
MONOCYTES NFR BLD AUTO: 9.2 %
NEUTROPHILS # BLD AUTO: 25.33 X10 (3) UL (ref 1.5–7.7)
NEUTROPHILS # BLD AUTO: 25.33 X10(3) UL (ref 1.5–7.7)
NEUTROPHILS NFR BLD AUTO: 83.7 %
NITRITE UR QL STRIP.AUTO: NEGATIVE
OSMOLALITY SERPL CALC.SUM OF ELEC: 284 MOSM/KG (ref 275–295)
PH UR STRIP.AUTO: 5.5 [PH] (ref 5–8)
PLATELET # BLD AUTO: 212 10(3)UL (ref 150–450)
POTASSIUM SERPL-SCNC: 4.3 MMOL/L (ref 3.5–5.1)
PROT SERPL-MCNC: 7.4 G/DL (ref 6.4–8.2)
PROT UR STRIP.AUTO-MCNC: 200 MG/DL
RBC # BLD AUTO: 5.64 X10(6)UL
RBC #/AREA URNS AUTO: >10 /HPF
SODIUM SERPL-SCNC: 135 MMOL/L (ref 136–145)
SP GR UR STRIP.AUTO: 1.02 (ref 1–1.03)
TROPONIN I SERPL HS-MCNC: 19 NG/L
UROBILINOGEN UR STRIP.AUTO-MCNC: NORMAL MG/DL
WBC # BLD AUTO: 30.3 X10(3) UL (ref 4–11)
WBC #/AREA URNS AUTO: >50 /HPF
WBC CLUMPS UR QL AUTO: PRESENT /HPF

## 2024-05-02 PROCEDURE — 70450 CT HEAD/BRAIN W/O DYE: CPT | Performed by: EMERGENCY MEDICINE

## 2024-05-02 PROCEDURE — 71045 X-RAY EXAM CHEST 1 VIEW: CPT | Performed by: EMERGENCY MEDICINE

## 2024-05-02 NOTE — TELEPHONE ENCOUNTER
Call was transferred from   Spoke with spouse Lori, stated pt is acting differently since last night, he had episodes like this in the past but able to come out of it soon but this time he is still having symptoms of not being him self, spouse is in panic mode unable to verbalize exact description of the symptoms, pt has ate his meals ok, /100 which is very high for pt  Advised to take him to ER or call 911 for further evaluation  Advised not wait longer since his symptoms have been going on since yesterday  Spouse v/u and will call 911  Routed to Dr. Pantoja for update on advised pt to proceed to ER

## 2024-05-02 NOTE — TELEPHONE ENCOUNTER
Stan wife called stating his BP is 155/100 He is having some memory issues and he is not acting like himself. Transferred over call to one of the nurses.

## 2024-05-02 NOTE — ED INITIAL ASSESSMENT (HPI)
Altered  level of consciousness  since  mid night  as per wife he is not acting right . Blood pressure high at home denies any fever able to tell his name and birthday .  Wife told he is baseline dementia , denies any urinary problem . Today he is very weak and tired

## 2024-05-03 PROBLEM — N40.1 BENIGN PROSTATIC HYPERPLASIA WITH URINARY HESITANCY: Status: ACTIVE | Noted: 2017-10-19

## 2024-05-03 PROBLEM — R39.11 BENIGN PROSTATIC HYPERPLASIA WITH URINARY HESITANCY: Status: ACTIVE | Noted: 2017-10-19

## 2024-05-03 PROBLEM — N30.00 ACUTE CYSTITIS WITHOUT HEMATURIA: Status: ACTIVE | Noted: 2024-05-03

## 2024-05-03 LAB
ATRIAL RATE: 96 BPM
LACTATE SERPL-SCNC: 2 MMOL/L (ref 0.4–2)
LACTATE SERPL-SCNC: 2.5 MMOL/L (ref 0.4–2)
LACTATE SERPL-SCNC: 2.8 MMOL/L (ref 0.4–2)
P AXIS: 62 DEGREES
P-R INTERVAL: 184 MS
Q-T INTERVAL: 362 MS
QRS DURATION: 96 MS
QTC CALCULATION (BEZET): 457 MS
R AXIS: 11 DEGREES
T AXIS: 39 DEGREES
VENTRICULAR RATE: 96 BPM

## 2024-05-03 PROCEDURE — 99223 1ST HOSP IP/OBS HIGH 75: CPT | Performed by: HOSPITALIST

## 2024-05-03 RX ORDER — ONDANSETRON 2 MG/ML
4 INJECTION INTRAMUSCULAR; INTRAVENOUS EVERY 6 HOURS PRN
Status: DISCONTINUED | OUTPATIENT
Start: 2024-05-03 | End: 2024-05-05

## 2024-05-03 RX ORDER — METOPROLOL SUCCINATE 25 MG/1
25 TABLET, EXTENDED RELEASE ORAL
Status: DISCONTINUED | OUTPATIENT
Start: 2024-05-03 | End: 2024-05-05

## 2024-05-03 RX ORDER — SODIUM CHLORIDE 9 MG/ML
INJECTION, SOLUTION INTRAVENOUS CONTINUOUS
Status: DISCONTINUED | OUTPATIENT
Start: 2024-05-03 | End: 2024-05-05

## 2024-05-03 RX ORDER — METOCLOPRAMIDE HYDROCHLORIDE 5 MG/ML
10 INJECTION INTRAMUSCULAR; INTRAVENOUS EVERY 8 HOURS PRN
Status: DISCONTINUED | OUTPATIENT
Start: 2024-05-03 | End: 2024-05-05

## 2024-05-03 RX ORDER — HYDRALAZINE HYDROCHLORIDE 50 MG/1
100 TABLET, FILM COATED ORAL 2 TIMES DAILY
Status: DISCONTINUED | OUTPATIENT
Start: 2024-05-03 | End: 2024-05-05

## 2024-05-03 RX ORDER — ACETAMINOPHEN 500 MG
500 TABLET ORAL EVERY 4 HOURS PRN
Status: DISCONTINUED | OUTPATIENT
Start: 2024-05-03 | End: 2024-05-05

## 2024-05-03 RX ORDER — FAMOTIDINE 10 MG
10 TABLET ORAL NIGHTLY
Status: DISCONTINUED | OUTPATIENT
Start: 2024-05-03 | End: 2024-05-05

## 2024-05-03 RX ORDER — ATORVASTATIN CALCIUM 10 MG/1
10 TABLET, FILM COATED ORAL NIGHTLY
Status: DISCONTINUED | OUTPATIENT
Start: 2024-05-03 | End: 2024-05-05

## 2024-05-03 NOTE — H&P
[facility]     Nick Kemp Patient Status:  Emergency    1940 MRN [id number]   Location [facility] EMERGENCY DEPARTMENT Attending Ora Pritchard MD   Hosp Day # 0 PCP Toby Pantoja MD     Chief Complaint: AMS    Subjective:    History of Present Illness:     Nick Kemp is a 83 year old male with history of coronary disease, hypertension, hyperlipidemia chronic kidney disease, atrial flutter presents emergency room with altered mental status wife says he has been confused for the last 2 days and then yesterday started having some generalized weakness.  No fevers, chills, nausea, vomiting, diarrhea or constipation.  No hematochezia, melena, hematuria.  No dysuria.  No chest pain, shortness of breath, palpitations.    History/Other:    Past Medical History:  Past Medical History:    Anesthesia complication    Aortic insufficiency    Arrhythmia    Atrial flutter (HCC)    Bad breath    possibly d/t GERD    Cancer (HCC)    basal cell skin cancer    Cataract    Cholelithiasis    Coronary atherosclerosis of unspecified type of vessel, native or graft    Difficult intubation    Dr. Sol gave letter - will bring with him    DVT (deep venous thrombosis) (HCC)    Earache    secondary to TMJ    Esophageal reflux    Hearing impairment    HEART ATTACK    HIGH BLOOD PRESSURE    HIGH CHOLESTEROL    Hypercholesterolemia    Long term (current) use of anticoagulants    Nephrotic syndrome    Other and unspecified hyperlipidemia    PVC's (premature ventricular contractions)    Renal disorder    Shortness of breath    Syncope    Testicular pain    likely from under treated epididymitis    Unspecified essential hypertension    Visual impairment    glasses     Past Surgical History:   Past Surgical History:   Procedure Laterality Date    Cabg      triple bypass    Cataract      Cath percutaneous  transluminal coronary angioplasty      early      Cholecystectomy       Colonoscopy  9/8/2004, 2011    x2    Open heart surgery (pbp)      Skin surgery Right 8-11-14    Excision for BCC, nodular to right upper back, [place]    Skin surgery Left 8/21/14    MMS for BCC-nod to the L Nasal Supratip    Skin surgery  9/16/16    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    Splint, hammer toe      Tonsillectomy        Family History:   Family History   Problem Relation Age of Onset    Heart Disease Father     Other (Jacqueline Gehrig's disease) Brother      Social History:    reports that he quit smoking about 47 years ago. His smoking use included cigarettes. He has never used smokeless tobacco. He reports current alcohol use. He reports that he does not use drugs.     Allergies: No Known Allergies    Medications:    No current facility-administered medications on file prior to encounter.     Current Outpatient Medications on File Prior to Encounter   Medication Sig Dispense Refill    METOPROLOL SUCCINATE ER 25 MG Oral Tablet 24 Hr TAKE 1 TABLET (25 MG TOTAL) BY MOUTH IN THE MORNING AND BEFORE BEDTIME 180 tablet 1    HYDRALAZINE 100 MG Oral Tab TAKE 1 TABLET BY MOUTH TWICE A  tablet 1    ATORVASTATIN 10 MG Oral Tab TAKE 1 TABLET BY MOUTH EVERY DAY 90 tablet 1    furosemide 20 MG Oral Tab Take 1 tablet (20 mg total) by mouth daily as needed (leg swelling).      metoprolol succinate ER 25 MG Oral Tablet 24 Hr Take 1 tablet (25 mg total) by mouth 2 (two) times daily. 60 tablet 1    acetaminophen 325 MG Oral Tab Take 1 tablet (325 mg total) by mouth in the morning and 1 tablet (325 mg total) before bedtime. 1 in the morning and 1 at night.      rivaroxaban (XARELTO) 20 MG Oral Tab TAKE 1 TABLET BY MOUTH EVERY DAY WITH FOOD (Patient taking differently: at bedtime. TAKE 1 TABLET BY MOUTH EVERY DAY WITH FOOD) 90 tablet 3    famotidine 10 MG Oral Tab Take 1 tablet (10 mg total) by mouth at bedtime.         Review of Systems:   A comprehensive review of systems was completed.    Pertinent positives and negatives noted in  the HPI.    Objective:   Physical Exam:    BP (!) 163/86   Pulse 118   Temp 98.9 °F (37.2 °C) (Temporal)   Resp 18   Ht 6' 2\" (1.88 m)   Wt 200 lb (90.7 kg)   SpO2 94%   BMI 25.68 kg/m²   General: No acute distress, Alert  Respiratory: No rhonchi, no wheezes  Cardiovascular: S1, S2. Regular rate and rhythm  Abdomen: Soft, Non-tender, non-distended, positive bowel sounds  Neuro: No new focal deficits  Extremities: No edema      Results:    Labs:      Labs Last 24 Hours:    Recent Labs   Lab 05/02/24  2100   RBC 5.64   HGB 16.0   HCT 49.3   MCV 87.4   MCH 28.4   MCHC 32.5   RDW 14.2   NEPRELIM 25.33*   WBC 30.3*   .0       Recent Labs   Lab 05/02/24  2100   *   BUN 20   CREATSERUM 1.33*   EGFRCR 52*   CA 9.6   ALB 3.1*   *   K 4.3      CO2 24.0   ALKPHO 77   AST 20   ALT 20   BILT 1.8   TP 7.4       Lab Results   Component Value Date    PT 13.7 03/31/2013    INR 2.32 (H) 06/21/2023    INR 1.42 (H) 01/16/2020    INR 1.01 10/24/2018       Recent Labs   Lab 05/02/24  2100   TROPHS 19       No results for input(s): \"TROP\", \"PBNP\" in the last 168 hours.    No results for input(s): \"PCT\" in the last 168 hours.    Imaging: Imaging data reviewed in Epic.    Assessment & Plan:    #Sepsis  -Tachycardia, lactic acidosis, leukocytosis  -Patient did not get sepsis bolus due to heart failure and some vascular congestion short of the chest x-ray.  -Continue broad-spectrum antibiotics  -Cultures pending    # UTI  - Will continue on rocephin  - Urine cultures pending    # Acute toxic metabolic encephalopathy  - Secondary to UTI.    # Atrial flutter  - Will continue on metoprolol for rate control.  - Will continue on xarelto for anticoagulation    # Hypertension  -Will continue on metoprolol, hydralazine.    # Acute renal failure  - Will continue IVF   - repeat BMP in the am.    Plan of care discussed with patient in the am.    Rashid Stevenson, DO    Supplementary Documentation:     The [facility]  Century Cures Act makes medical notes like these available to patients in the interest of transparency. Please be advised this is a medical document. Medical documents are intended to carry relevant information, facts as evident, and the clinical opinion of the practitioner. The medical note is intended as peer to peer communication and may appear blunt or direct. It is written in medical language and may contain abbreviations or verbiage that are unfamiliar.

## 2024-05-03 NOTE — PROGRESS NOTES
AxOx3. Forgetful. Confused at times.  Tele, NSR, PVCs.  RA, .  Briefed & primofit. UTI.  SBA w/ walker. PT evaluated. Up in chair.  0.9 @100.  Pills whole in applesauce/pudding.  Repeat lactic: 2.0  Call light within reach.  Wife at bedside.   No further needs at this time.

## 2024-05-03 NOTE — OCCUPATIONAL THERAPY NOTE
OCCUPATIONAL THERAPY EVALUATION - INPATIENT     Room Number: 504/504-A  Evaluation Date: 5/3/2024  Type of Evaluation: Initial  Presenting Problem: sepsis due to UTI    Physician Order: IP Consult to Occupational Therapy  Reason for Therapy: ADL/IADL Dysfunction and Discharge Planning    OCCUPATIONAL THERAPY ASSESSMENT   Patient is currently functioning below baseline with ADLs and functional mobility. Prior to admission, patient's baseline is IND/MOD I with ADLs, utilizing SPC or RW for mobility.  Patient is requiring contact guard assist as a result of the following impairments: decreased endurance and impaired standing balance. Occupational Therapy will continue to follow for duration of hospitalization.    Patient will benefit from continued skilled OT Services at discharge to promote functional independence and safety with additional support and return home with home health OT      History Related to Current Admission: Patient is a 83 year old male admitted on 5/2/2024 with Presenting Problem: sepsis due to UTI. Co-Morbidities : CKD, chronic diastolic heart failure, BPH, CAD, atrial flutter    WEIGHT BEARING RESTRICTION  Weight Bearing Restriction: None                Recommendations for nursing staff:   Transfers: one person with RW  Toileting location: bedside commode or toilet    EVALUATION SESSION:  Patient Start of Session: semi-supine in bed  FUNCTIONAL TRANSFER ASSESSMENT  Sit to Stand: Edge of Bed  Edge of Bed: Contact Guard Assist    BED MOBILITY  Supine to Sit : Supervision  Sit to Supine (OT): Not Tested  Scooting: Supervisioin    BALANCE ASSESSMENT  Static Sitting: Supervision  Sitting Unilateral: Supervision  Static Standing: Contact Guard Assist  Standing Unilateral: Contact Guard Assist    FUNCTIONAL ADL ASSESSMENT       ACTIVITY TOLERANCE: functional mobility approx 3 minutes with RW                         O2 SATURATIONS       COGNITION  Overall Cognitive Status:  Impaired  Orientation Level:   oriented to place, oriented to person, and disoriented to time  Following Commands:  follows one step commands with increased time and follows one step commands with repetition  Safety Judgement:  decreased awareness of need for assistance and decreased awareness of need for safety  Problem Solving:  assistance required to identify errors made, assistance required to generate solutions, and assistance required to implement solutions    Upper Extremity   ROM: within functional limits   Strength: within functional limits   Coordination  Gross motor: WFL  Fine motor: WFL  Sensation: Light touch:  intact    EDUCATION PROVIDED  Patient: Role of Occupational Therapy; Plan of Care; Functional Transfer Techniques; Fall Prevention; Energy Conservation; Posture/Positioning; Proper Body Mechanics  Patient's Response to Education: Verbalized Understanding; Requires Further Education    Equipment used: RW  Demonstrates functional use, Would benefit from additional trial      Therapist comments: Pt requires verbal cues for safety and hand placement with hand over hand assist in preparation for transfers.     Patient End of Session: Needs met;Call light within reach;Up in chair;RN aware of session/findings;All patient questions and concerns addressed;Alarm set    OCCUPATIONAL PROFILE    HOME SITUATION  Type of Home: House  Home Layout: Multi-level  Lives With: Spouse               Occupation/Status: retired paper saleman        Patient Regularly Uses: Glasses    Prior Level of Function: Pt is IND with BADLs, IADLs. Pt uses RW in community and SPC in the home.     SUBJECTIVE   \"[place] into the city anymore.\"    PAIN ASSESSMENT  Ratin  Location: denies       OBJECTIVE  Precautions: Bed/chair alarm  Fall Risk: High fall risk      ASSESSMENTS    AM-PAC ‘6-Clicks’ Inpatient Daily Activity Short Form  -   Putting on and taking off regular lower body clothing?: A Little  -   Bathing (including washing, rinsing, drying)?: A  Little  -   Toileting, which includes using toilet, bedpan or urinal? : A Little  -   Putting on and taking off regular upper body clothing?: None  -   Taking care of personal grooming such as brushing teeth?: None  -   Eating meals?: None    AM-PAC Score:  Score: 21  Approx Degree of Impairment: 32.79%  Standardized Score (AM-PAC Scale): 44.27    ADDITIONAL TESTS     NEUROLOGICAL FINDINGS      COGNITION ASSESSMENTS       PLAN  OT Treatment Plan: Balance activities;Energy conservation/work simplification techniques;ADL training;IADL training;Functional transfer training;UE strengthening/ROM;Endurance training;Patient/Family education;Patient/Family training;Equipment eval/education;Compensatory technique education;Continued evaluation  Rehab Potential : Good  Frequency: 3-5x/week  Number of Visits to Meet Established Goals: 3    ADL Goals   Patient will perform grooming: with supervision  Patient will perform upper body dressing:  with supervision  Patient will perform lower body dressing:  with supervision  Patient will perform toileting: with supervision    Functional Transfer Goals  Patient will transfer from supine to sit:  with supervision  Patient will transfer from sit to stand:  with supervision  Patient will transfer to bedside commode:  with supervision  Patient will transfer to toilet:  with supervision    UE Exercise Program Goal  Patient will be supervision with bilateral AROM HEP (home exercise program).    Additional Goals  Pt will tolerate standing for 6 minutes utilizing AD as needed in preparation to perform grooming ADLs at sink level.    Patient Evaluation Complexity Level:   Occupational Profile/Medical History LOW - Brief history including review of medical or therapy records    Specific performance deficits impacting engagement in ADL/IADL LOW  1 - 3 performance deficits    Client Assessment/Performance Deficits MODERATE - Comorbidities and min to mod modifications of tasks    Clinical  Decision Making LOW - Analysis of occupational profile, problem-focused assessments, limited treatment options    Overall Complexity LOW     OT Session Time: 16 minutes  Therapeutic Activity: 12 minutes

## 2024-05-03 NOTE — PHYSICAL THERAPY NOTE
PHYSICAL THERAPY EVALUATION - INPATIENT     Room Number: 504/504-A  Evaluation Date: 5/3/2024  Type of Evaluation: Initial  Physician Order: PT Eval and Treat    Presenting Problem: sepsis     Reason for Therapy: Mobility Dysfunction and Discharge Planning    PHYSICAL THERAPY ASSESSMENT   Patient is currently functioning near baseline with bed mobility, transfers, gait, and stair negotiation.  Prior to admission, patient's baseline is ambulatory with RW vs cane.  Patient is requiring contact guard assist as a result of the following impairments: cognitive deficits (AMS).  Physical Therapy will continue to follow for duration of hospitalization.    Patient will benefit from continued skilled PT Services at discharge to promote functional independence and safety with additional support and return home with home health PT.    PLAN  PT Treatment Plan: Bed mobility;Body mechanics;Endurance;Energy conservation;Family education;Patient education;Gait training;Range of motion;Stoop training;Stair training;Transfer training;Balance training  Rehab Potential : Good  Frequency (Obs):  (2-3x/week)  Number of Visits to Meet Established Goals: 3      CURRENT GOALS    Goal #1 Patient is able to demonstrate supine - sit EOB @ level: modified independent     Goal #2 Patient is able to demonstrate transfers Sit to/from Stand at assistance level: supervision     Goal #3 Patient is able to ambulate 150 feet with assist device: walker - rolling at assistance level: supervision     Goal #4 Patient will negotiate 1 flight of stairs with supervision to ensure safety at home.   Goal #5    Goal #6    Goal Comments: Goals established on 5/3/2024      PHYSICAL THERAPY MEDICAL/SOCIAL HISTORY  History related to current admission: Patient is a 83 year old male admitted on 5/2/2024 from home for AMS, weakness.  Pt dx with sepsis due to UTI.      HOME SITUATION  Type of Home: House   Home Layout: Multi-level                Lives With:  Spouse     Patient Owned Equipment: Rolling walker       Prior Level of [place]:  Pt reports mod ind with cane mostly in home, uses RW to ambulate to get the mail.    SUBJECTIVE  \"It's not what it used to be...\"      OBJECTIVE  Precautions: Bed/chair alarm  Fall Risk: High fall risk    WEIGHT BEARING RESTRICTION                   PAIN ASSESSMENT  [place]          COGNITION  Orientation Level:  oriented to person and disoriented to time  Following Commands:  follows one step commands with repetition    RANGE OF MOTION AND STRENGTH ASSESSMENT  Upper extremity ROM and strength are within functional limits     Lower extremity ROM is within functional limits     Lower extremity strength is within functional limits       BALANCE  Static Sitting: Good  Dynamic Sitting: Fair -  Static Standing: Fair -  Dynamic Standing: Fair -    ADDITIONAL TESTS                                    ACTIVITY TOLERANCE                         O2 WALK       NEUROLOGICAL FINDINGS                        AM-PAC '6-Clicks' INPATIENT SHORT FORM - BASIC MOBILITY  How much difficulty does the patient currently have...  Patient Difficulty: Turning over in bed (including adjusting bedclothes, sheets and blankets)?: A Little   Patient Difficulty: Sitting down on and standing up from a chair with arms (e.g., wheelchair, bedside commode, etc.): A Little   Patient Difficulty: Moving from lying on back to sitting on the side of the bed?: A Little   How much help from another person does the patient currently need...   Help from Another: Moving to and from a bed to a chair (including a wheelchair)?: A Little   Help from Another: Need to walk in hospital room?: A Little           AM-PAC Score:                   FUNCTIONAL ABILITY STATUS  Gait Assessment   Functional Mobility/Gait Assessment  Gait Assistance: Contact guard assist  Distance (ft): 80  Assistive Device: Rolling walker  Pattern: Shuffle    Skilled Therapy Provided     Bed Mobility:  Rolling:  supervision  Supine to sit: supervision   Sit to supine: NT     Transfer Mobility:  Sit to stand: CGA   Stand to sit: CGA  Gait = Ambulation as above.    Therapist's Comments: Encouraged OOB to chair with staff assist, to ambulate in hallway with assist.    Exercise/Education Provided:  Bed mobility  Body mechanics  Functional activity tolerated  Gait training  Transfer training    Patient End of Session: Up in chair;Needs met;RN aware of session/findings;Call light within reach;All patient questions and concerns addressed;Alarm set;Discussed recommendations with /      Patient Evaluation Complexity Level:  History Moderate - 1 or 2 personal factors and/or co-morbidities   Examination of body systems Moderate - addressing a total of 3 or more elements   Clinical Presentation Moderate - Evolving   Clinical Decision Making Moderate - Evolving       PT Session Time: 25 minutes    Therapeutic Activity: 10 minutes

## 2024-05-03 NOTE — PLAN OF CARE
Received pt A&Ox2-3. Confused.  on RA. NSR with PVCs on tele. Receiving xarelto. Repeat lactic 2.8. Sepsis BPA fired, MD made aware - 3 hour repeat lactic ordered. Tolerating diet. Takes pills whole with applesauce  or pudding. Voids via primofit. Ambulates with cane at baseline. Pt deconditioned. PT/OT to evaluate. Denies Pain. Plan of care continues, no further needs at this time.     Problem: MUSCULOSKELETAL - ADULT  Goal: Return mobility to safest level of function  Description: INTERVENTIONS:  - Assess patient stability and activity tolerance for standing, transferring and ambulating w/ or w/o assistive devices  - Assist with transfers and ambulation using safe patient handling equipment as needed  - Ensure adequate protection for wounds/incisions during mobilization  - Obtain PT/OT consults as needed  - Advance activity as appropriate  - Communicate ordered activity level and limitations with patient/family  Outcome: Progressing     Problem: Impaired Functional Mobility  Goal: Achieve highest/safest level of mobility/gait  Description: Interventions:  - Assess patient's functional ability and stability  - Promote increasing activity/tolerance for mobility and gait  - Educate and engage patient/family in tolerated activity level and precautions  - Recommend use of  cane for transfers and ambulation  Outcome: Progressing     Problem: Impaired Activities of Daily Living  Goal: Achieve highest/safest level of independence in self care  Description: Interventions:  - Assess ability and encourage patient to participate in ADLs to maximize function  - Promote sitting position while performing ADLs such as feeding, grooming, and bathing  - Educate and encourage patient/family in tolerated functional activity level and precautions during self-care  Outcome: Progressing     Problem: Impaired Cognition  Goal: Patient will exhibit improved attention, thought processing and/or memory  Description: Interventions:  -  Allow additional time for processing after asking questions or providing instructions  Outcome: Progressing

## 2024-05-03 NOTE — CM/SW NOTE
05/03/24 1600   CM/SW Referral Data   Referral Source Social Work (self-referral)   Reason for Referral Discharge planning   Informant Patient;EMR;Spouse/Significant Other   Medical Hx   Does patient have an established PCP? Yes   Patient Info   Patient's Current Mental Status at Time of Assessment Alert;Oriented   Patient's Home Environment House   Patient lives with Spouse/Significant other   Discharge Needs   Anticipated D/C needs To be determined       HOME SITUATION  Type of Home: House   Home Layout: Multi-level     Lives With: Spouse  Patient Owned Equipment: Rolling walker     Prior Level of [place]:  Pt reports mod ind with cane mostly in home, uses RW to ambulate to get the mail.  (Per PT evaluation)      Patient is an 82 y/o female who admitted with Sepsis due to urinary tract infection.  Anticipated therapy need: Home with Home Healthcare    Met with patient, who was alert and oriented, to discuss discharge planning. He lives with his wife in a house in [place]. They have been  for over 60 years. They have 2 adult sons that are supportive. Offered HH, he deferred SW to reach out to his wife to discuss. He has history with [facility].    Spoke with patient's wife (Lori) to discuss discharge planning. Discussed HH. She feels he was doing well and does not currently need HH. Requested she update us if they change their mind.    SW/CM to remain available for support and/or discharge planning.    SHELDON Verduzco  Discharge Planner  [phone]

## 2024-05-03 NOTE — ED QUICK NOTES
Orders for admission, patient is aware of plan and ready to go upstairs. Any questions, please call ED RN arnie at extension [phone].     Patient Covid vaccination status: Fully vaccinated     COVID Test Ordered in ED: None    COVID Suspicion at Admission: N/A    Running Infusions:  None    Mental Status/LOC at time of transport: A+O x 1-2    Other pertinent information:   CIWA score: N/A   NIH score:  7

## 2024-05-03 NOTE — ED PROVIDER NOTES
Patient Seen in: [facility] Emergency Department      History     Chief Complaint   Patient presents with    Altered Mental Status     Stated Complaint: AMS, acting strange per wife since midnight, elevated BP    Subjective:   HPI     83 year old male with past medical history of CAD, hypertension, hyperlipidemia, CKD, atrial flutter presents ED for altered mental status.  Wife states he is confused.  Some weakness yesterday.  Today he felt more strength during the daytime.  He has been more confused, speech is nonsensical,.  Wife states that he has some baseline dementia but he is more confused today.  No trauma.  No chest pain no abdominal pain no vomiting diarrhea.  No other associate symptoms or complaints.    Objective:   Past Medical History:    Anesthesia complication    Aortic insufficiency    Arrhythmia    Atrial flutter (HCC)    Bad breath    possibly d/t GERD    Cancer (HCC)    basal cell skin cancer    Cataract    Cholelithiasis    Coronary atherosclerosis of unspecified type of vessel, native or graft    Difficult intubation    Dr. Sol gave letter - will bring with him    DVT (deep venous thrombosis) (HCC)    Earache    secondary to TMJ    Esophageal reflux    Hearing impairment    HEART ATTACK    HIGH BLOOD PRESSURE    HIGH CHOLESTEROL    Hypercholesterolemia    Long term (current) use of anticoagulants    Nephrotic syndrome    Other and unspecified hyperlipidemia    PVC's (premature ventricular contractions)    Renal disorder    Shortness of breath    Syncope    Testicular pain    likely from under treated epididymitis    Unspecified essential hypertension    Visual impairment    glasses              Past Surgical History:   Procedure Laterality Date    Cabg  1993    triple bypass    Cataract      Cath percutaneous  transluminal coronary angioplasty      early 1990's     Cholecystectomy      Colonoscopy  9/8/2004, 2011    x2    Open heart surgery (pbp)      Skin surgery Right 8-11-14     Excision for BCC, nodular to right upper back, [place]    Skin surgery Left 14    MMS for BCC-nod to the L Nasal Supratip    Skin surgery  16    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    Splint, hammer toe      Tonsillectomy                  Social History     Socioeconomic History    Marital status:     Number of children: 1   Occupational History    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     Social Determinants of Health     Food Insecurity: No Food Insecurity (2023)    Food Insecurity     Food Insecurity: Never true   Transportation Needs: No Transportation Needs (2023)    Transportation Needs     Lack of Transportation: No   Housing Stability: Low Risk  (2023)    Housing Stability     Housing Instability: No              Review of Systems    Positive for stated complaint: AMS, acting strange per wife since midnight, elevated BP  Other systems are as noted in HPI.  Constitutional and vital signs reviewed.      All other systems reviewed and negative except as noted above.    Physical Exam     ED Triage Vitals [24 1704]   /86   Pulse 94   Resp 18   Temp 98.9 °F (37.2 °C)   Temp src Temporal   SpO2 97 %   O2 Device None (Room air)       Current:/71   Pulse 105   Temp 98.9 °F (37.2 °C) (Temporal)   Resp 12   Ht 188 cm (6' 2\")   Wt 90.7 kg   SpO2 98%   BMI 25.68 kg/m²         Physical Exam  Vitals and nursing note reviewed.   Constitutional:       General: He is not in acute distress.     Appearance: He is well-developed. He is not toxic-appearing.    HENT:      Head: Normocephalic and atraumatic.   Eyes:      General: No scleral icterus.     Conjunctiva/sclera: Conjunctivae normal.   Cardiovascular:      Rate and Rhythm: Normal rate.      Pulses: Normal pulses.   Pulmonary:      Effort: Pulmonary effort is normal. No respiratory distress.      Breath sounds: No stridor. No wheezing or rhonchi.   Abdominal:      General: There is no distension.   Musculoskeletal:         General: No tenderness. Normal range of motion.      Cervical back: Normal range of motion and neck supple.   Skin:     General: Skin is warm and dry.      Findings: No rash.   Neurological:      Mental Status: He is alert. He is disoriented.      Cranial Nerves: No cranial nerve deficit.      Motor: No weakness or abnormal muscle tone.      Coordination: Coordination normal.   Psychiatric:         Behavior: Behavior normal.              ED Course     Labs Reviewed   COMP METABOLIC PANEL (14) - Abnormal; Notable for the following components:       Result Value    Glucose 118 (*)     Sodium 135 (*)     Creatinine 1.33 (*)     eGFR-Cr 52 (*)     Albumin 3.1 (*)     A/G Ratio 0.7 (*)     All other components within normal limits   URINALYSIS, ROUTINE - Abnormal; Notable for the following components:    Clarity Urine Ex.Turbid (*)     Blood Urine 3+ (*)     Protein Urine 200 (*)     Leukocyte Esterase Urine 500 (*)     WBC Urine >50 (*)     RBC Urine >10 (*)     Bacteria Urine 1+ (*)     WBC Clump Present (*)     All other components within normal limits    Narrative:     checked   CBC W/ DIFFERENTIAL - Abnormal; Notable for the following components:    WBC 30.3 (*)     Neutrophil Absolute Prelim 25.33 (*)     Neutrophil Absolute 25.33 (*)     Monocyte Absolute 2.79 (*)     All other components within normal limits   TROPONIN I HIGH SENSITIVITY - Normal   CBC WITH DIFFERENTIAL WITH PLATELET    Narrative:     The following orders were created for panel order CBC With Differential With  Platelet.  Procedure                               Abnormality         Status                     ---------                               -----------         ------                     CBC W/ DIFFERENTIAL[[id number]]          Abnormal            Final result                 Please view results for these tests on the individual orders.   SCAN SLIDE   LACTIC ACID, PLASMA   RAINBOW DRAW LAVENDER   RAINBOW DRAW LIGHT GREEN   RAINBOW DRAW BLUE   BLOOD CULTURE   BLOOD CULTURE     EKG    Rate, intervals and axes as noted on EKG Report.  Rate: 96  Rhythm: Sinus rhythm, PVCs, baseline artifact  Reading: Sinus rhythm, PVCs, baseline artifact                           MDM           -Tracing on cardiac monitor and pulse oximetry was reviewed by myself.   -The cardiac monitor revealed normal sinus rhythm as interpreted by me. The cardiac monitor was ordered to monitor the patient for dysrhythmia  -Pulse oximetry was interpreted by me and was normal.  Pulse oximeter was ordered to monitor patient for hypoxia.        -History source other than patient - wife        -Comorbidities did add complexity to the management are mentioned in the HPI above        -I personally reviewed the prior external notes and the medical record to obtain additional history -reviewed discharge summary from December 2023, patient was admitted for gram-negative pneumonia.        -DDX: Includes but not limited to  -hemorrhage, sepsis, UTI        -I personally reviewed the radiographs findings and they show no infiltrates  Please refer to radiology report for official interpretation        -I personally reviewed the CT findings and it shows no intracranial hemorrhage  Please refer to radiology report for official interpretation            Admission disposition: 5/2/2024 11:34 PM           Labs Reviewed   COMP METABOLIC PANEL (14) - Abnormal; Notable for the following components:       Result Value    Glucose 118 (*)     Sodium 135 (*)     Creatinine 1.33 (*)      eGFR-Cr 52 (*)     Albumin 3.1 (*)     A/G Ratio 0.7 (*)     All other components within normal limits   URINALYSIS, ROUTINE - Abnormal; Notable for the following components:    Clarity Urine Ex.Turbid (*)     Blood Urine 3+ (*)     Protein Urine 200 (*)     Leukocyte Esterase Urine 500 (*)     WBC Urine >50 (*)     RBC Urine >10 (*)     Bacteria Urine 1+ (*)     WBC Clump Present (*)     All other components within normal limits    Narrative:     checked   CBC W/ DIFFERENTIAL - Abnormal; Notable for the following components:    WBC 30.3 (*)     Neutrophil Absolute Prelim 25.33 (*)     Neutrophil Absolute 25.33 (*)     Monocyte Absolute 2.79 (*)     All other components within normal limits   TROPONIN I HIGH SENSITIVITY - Normal   CBC WITH DIFFERENTIAL WITH PLATELET    Narrative:     The following orders were created for panel order CBC With Differential With Platelet.  Procedure                               Abnormality         Status                     ---------                               -----------         ------                     CBC W/ DIFFERENTIAL[[id number]]          Abnormal            Final result                 Please view results for these tests on the individual orders.   SCAN SLIDE   LACTIC ACID, PLASMA   RAINBOW DRAW LAVENDER   RAINBOW DRAW LIGHT GREEN   RAINBOW DRAW BLUE   BLOOD CULTURE   BLOOD CULTURE     Labs reviewed there is a leukocytosis of 30,000.  Urinalysis suggestive of UTI.  Creatinine is 1.3.  Sodium 135 sodium potassium normal.  Patient given IV fluids IV antibiotics.  Mental status changes likely related to UTI.  He is not septic.  He will be admitted for further care.  Discussed with the hospital service for admission.                             Medical Decision Making      Disposition and Plan     Clinical Impression:  1. Sepsis due to urinary tract infection (HCC)         Disposition:  Admit  5/2/2024 11:34 pm    Follow-up:  No follow-up provider  specified.        Medications Prescribed:  Current Discharge Medication List                            Hospital Problems       Present on Admission  Date Reviewed: 2/29/2024            ICD-10-CM Noted POA    * (Principal) Sepsis due to urinary tract infection (HCC) A41.9, N39.0 5/2/2024 Unknown

## 2024-05-04 LAB
ANION GAP SERPL CALC-SCNC: 6 MMOL/L (ref 0–18)
BASOPHILS # BLD AUTO: 0.05 X10(3) UL (ref 0–0.2)
BASOPHILS NFR BLD AUTO: 0.3 %
BUN BLD-MCNC: 17 MG/DL (ref 9–23)
CALCIUM BLD-MCNC: 8.5 MG/DL (ref 8.5–10.1)
CHLORIDE SERPL-SCNC: 110 MMOL/L (ref 98–112)
CO2 SERPL-SCNC: 20 MMOL/L (ref 21–32)
CREAT BLD-MCNC: 0.79 MG/DL
EGFRCR SERPLBLD CKD-EPI 2021: 87 ML/MIN/1.73M2 (ref 60–?)
EOSINOPHIL # BLD AUTO: 0.12 X10(3) UL (ref 0–0.7)
EOSINOPHIL NFR BLD AUTO: 0.7 %
ERYTHROCYTE [DISTWIDTH] IN BLOOD BY AUTOMATED COUNT: 14 %
GLUCOSE BLD-MCNC: 131 MG/DL (ref 70–99)
GLUCOSE BLD-MCNC: 98 MG/DL (ref 70–99)
HCT VFR BLD AUTO: 42.1 %
HGB BLD-MCNC: 14 G/DL
IMM GRANULOCYTES # BLD AUTO: 0.09 X10(3) UL (ref 0–1)
IMM GRANULOCYTES NFR BLD: 0.6 %
LYMPHOCYTES # BLD AUTO: 2.15 X10(3) UL (ref 1–4)
LYMPHOCYTES NFR BLD AUTO: 13.3 %
MCH RBC QN AUTO: 28.7 PG (ref 26–34)
MCHC RBC AUTO-ENTMCNC: 33.3 G/DL (ref 31–37)
MCV RBC AUTO: 86.4 FL
MONOCYTES # BLD AUTO: 1.48 X10(3) UL (ref 0.1–1)
MONOCYTES NFR BLD AUTO: 9.1 %
NEUTROPHILS # BLD AUTO: 12.32 X10 (3) UL (ref 1.5–7.7)
NEUTROPHILS # BLD AUTO: 12.32 X10(3) UL (ref 1.5–7.7)
NEUTROPHILS NFR BLD AUTO: 76 %
OSMOLALITY SERPL CALC.SUM OF ELEC: 284 MOSM/KG (ref 275–295)
PLATELET # BLD AUTO: 160 10(3)UL (ref 150–450)
PLATELETS.RETICULATED NFR BLD AUTO: 4.1 % (ref 0–7)
POTASSIUM SERPL-SCNC: 3.7 MMOL/L (ref 3.5–5.1)
RBC # BLD AUTO: 4.87 X10(6)UL
SODIUM SERPL-SCNC: 136 MMOL/L (ref 136–145)
WBC # BLD AUTO: 16.2 X10(3) UL (ref 4–11)

## 2024-05-04 PROCEDURE — 99232 SBSQ HOSP IP/OBS MODERATE 35: CPT | Performed by: STUDENT IN AN ORGANIZED HEALTH CARE EDUCATION/TRAINING PROGRAM

## 2024-05-04 NOTE — PLAN OF CARE
Patient is alert and oriented x2, hx dementia. Wdl on RA. NS/afib on tele. VSS. On xarelto. BM this shift. Up x1 assist with walker. On IV abx. IVF infusing. Pt denies pain at this time. POC discussed with pt and wife at bedside. Safety precautions in place.   Problem: MUSCULOSKELETAL - ADULT  Goal: Return mobility to safest level of function  Description: INTERVENTIONS:  - Assess patient stability and activity tolerance for standing, transferring and ambulating w/ or w/o assistive devices  - Assist with transfers and ambulation using safe patient handling equipment as needed  - Ensure adequate protection for wounds/incisions during mobilization  - Obtain PT/OT consults as needed  - Advance activity as appropriate  - Communicate ordered activity level and limitations with patient/family  Outcome: Progressing     Problem: Impaired Functional Mobility  Goal: Achieve highest/safest level of mobility/gait  Description: Interventions:  - Assess patient's functional ability and stability  - Promote increasing activity/tolerance for mobility and gait  - Educate and engage patient/family in tolerated activity level and precautions    Outcome: Progressing     Problem: Impaired Activities of Daily Living  Goal: Achieve highest/safest level of independence in self care  Description: Interventions:  - Assess ability and encourage patient to participate in ADLs to maximize function  - Promote sitting position while performing ADLs such as feeding, grooming, and bathing  - Educate and encourage patient/family in tolerated functional activity level and precautions during self-care    Outcome: Progressing     Problem: Impaired Cognition  Goal: Patient will exhibit improved attention, thought processing and/or memory  Description: Interventions:    Outcome: Progressing     Problem: SAFETY ADULT - FALL  Goal: Free from fall injury  Description: INTERVENTIONS:  - Assess pt frequently for physical needs  - Identify cognitive and  physical deficits and behaviors that affect risk of falls.  - [place] fall precautions as indicated by assessment.  - Educate pt/family on patient safety including physical limitations  - Instruct pt to call for assistance with activity based on assessment  - Modify environment to reduce risk of injury  - Provide assistive devices as appropriate  - Consider OT/PT consult to assist with strengthening/mobility  - Encourage toileting schedule  Outcome: Progressing     Problem: Patient/Family Goals  Goal: Patient/Family Long Term Goal  Description: Patient's Long Term Goal: discharge home with appropriate means    Interventions:  - PT/OT  - See additional Care Plan goals for specific interventions  Outcome: Progressing  Goal: Patient/Family Short Term Goal  Description: Patient's Short Term Goal:   5/3 noc: safety/comfort    Interventions:   - freq rounds, provide quiet environment  - See additional Care Plan goals for specific interventions  Outcome: Progressing

## 2024-05-04 NOTE — PROGRESS NOTES
[facility]   part of [facility]     Hospitalist Progress Note     Nick ANDRADE Constable Patient Status:  Inpatient    1940 MRN [id number]   Formerly [facility] 5NW-A Attending Dominique Erickson,    Hosp Day # 1 PCP Toby Pantoja MD     Chief Complaint: AMS    Subjective:     Patient resting in chair in feels slightly better.  He continues to have urgency and frequency    Objective:    Review of Systems:   A comprehensive review of systems was completed; pertinent positive and negatives stated in subjective.    Vital signs:  Temp:  [97.7 °F (36.5 °C)-98.8 °F (37.1 °C)] 97.8 °F (36.6 °C)  Pulse:  [] 91  Resp:  [16-18] 18  BP: (117-142)/(58-93) 124/66  SpO2:  [96 %-98 %] 98 %    Physical Exam:    General: No acute distress  Respiratory: No wheezes, no rhonchi  Cardiovascular: S1, S2, regular rate and rhythm  Abdomen: Soft, Non-tender, non-distended, positive bowel sounds  Neuro: No new focal deficits.   Extremities: No edema      Diagnostic Data:    Labs:  Recent Labs   Lab 24  2100 24  0709   WBC 30.3* 16.2*   HGB 16.0 14.0   MCV 87.4 86.4   .0 160.0       Recent Labs   Lab 24  2100 24  0710   * 98   BUN 20 17   CREATSERUM 1.33* 0.79   CA 9.6 8.5   ALB 3.1*  --    * 136   K 4.3 3.7    110   CO2 24.0 20.0*   ALKPHO 77  --    AST 20  --    ALT 20  --    BILT 1.8  --    TP 7.4  --        Estimated Creatinine Clearance: 79.5 mL/min (based on SCr of 0.79 mg/dL).    Recent Labs   Lab 24  2100   TROPHS 19       No results for input(s): \"PTP\", \"INR\" in the last 168 hours.               Microbiology    Hospital Encounter on 24   1. Blood Culture     Status: None (Preliminary result)    Collection Time: 24 11:57 PM    Specimen: Blood,peripheral   Result Value Ref Range    Blood Culture Result No Growth 1 Day N/A         Imaging: Reviewed in Epic.    Medications:    atorvastatin  10 mg Oral Nightly    famotidine  10 mg Oral Nightly     hydrALAZINE  100 mg Oral BID    metoprolol succinate ER  25 mg Oral 2x Daily(Beta Blocker)    rivaroxaban  20 mg Oral Nightly    cefTRIAXone  1 g Intravenous Q24H       Assessment & Plan:      #Acute toxic metabolic encephalopathy  #UTI  -Urine culture in lab  -Blood culture in lab  -IV antibiotics    #TORI, resolved    #Hypertension  -Hydralazine, metoprolol     #Hyperlipidemia  -Statin     #Atrial flutter  -Metoprolol, Xarelto      Dominique Erickson,     Supplementary Documentation:     Quality:  DVT Mechanical Prophylaxis:        DVT Pharmacologic Prophylaxis   Medication    rivaroxaban (Xarelto) tab 20 mg                Code Status: Full Code  Sparks: External urinary catheter in place  Sparks Duration (in days):   Central line:    KRISS:     Discharge is dependent on: Clinical improvement  At this point Mr. Kemp is expected to be discharge to: Home    The 21st Century Cures Act makes medical notes like these available to patients in the interest of transparency. Please be advised this is a medical document. Medical documents are intended to carry relevant information, facts as evident, and the clinical opinion of the practitioner. The medical note is intended as peer to peer communication and may appear blunt or direct. It is written in medical language and may contain abbreviations or verbiage that are unfamiliar.             **Certification      PHYSICIAN Certification of Need for Inpatient Hospitalization - Initial Certification    Patient will require inpatient services that will reasonably be expected to span two midnight's based on the clinical documentation in H+P.   Based on patients current state of illness, I anticipate that, after discharge, patient will require TBD.

## 2024-05-04 NOTE — PROGRESS NOTES
AxOx3, Forgetful, Hx dementia.  RA. Tele, NSR/Afib. HR 90-120s.  Up in chair. X1 walker.  0.9 @100, bag replaced.  No c/o pain.  Briefed& primofit, UTI.  Call light within reach. Chair alarm.  Wife at bedside.  No further needs at this time.      Problem: MUSCULOSKELETAL - ADULT  Goal: Return mobility to safest level of function  Description: INTERVENTIONS:  - Assess patient stability and activity tolerance for standing, transferring and ambulating w/ or w/o assistive devices  - Assist with transfers and ambulation using safe patient handling equipment as needed  - Ensure adequate protection for wounds/incisions during mobilization  - Obtain PT/OT consults as needed  - Advance activity as appropriate  - Communicate ordered activity level and limitations with patient/family  Outcome: Progressing    Problem: Impaired Activities of Daily Living  Goal: Achieve highest/safest level of independence in self care  Description: Interventions:  - Assess ability and encourage patient to participate in ADLs to maximize function  - Promote sitting position while performing ADLs such as feeding, grooming, and bathing  Outcome: Progressing     Problem: Impaired Cognition  Goal: Patient will exhibit improved attention, thought processing and/or memory  Description: Interventions:  Outcome: Progressing     Problem: SAFETY ADULT - FALL  Goal: Free from fall injury  Description: INTERVENTIONS:  - Assess pt frequently for physical needs  - Identify cognitive and physical deficits and behaviors that affect risk of falls.  - [place] fall precautions as indicated by assessment.  - Educate pt/family on patient safety including physical limitations  - Instruct pt to call for assistance with activity based on assessment  - Modify environment to reduce risk of injury  - Provide assistive devices as appropriate  - Consider OT/PT consult to assist with strengthening/mobility  - Encourage toileting schedule  Outcome: Progressing     Problem:  Patient/Family Goals  Goal: Patient/Family Long Term Goal  Description: Patient's Long Term Goal: discharge home with appropriate means    Interventions:  - PT/OT  - See additional Care Plan goals for specific interventions  Outcome: Progressing  Goal: Patient/Family Short Term Goal  Description: Patient's Short Term Goal:   5/3 AM: work with PT  5/3 noc: safety/comfort  5/4 AM: sit up in chairs for meals    Interventions:   - freq rounds, provide quiet environment  - See additional Care Plan goals for specific interventions  Outcome: Progressing

## 2024-05-05 VITALS
HEART RATE: 49 BPM | OXYGEN SATURATION: 98 % | RESPIRATION RATE: 18 BRPM | WEIGHT: 207.63 LBS | BODY MASS INDEX: 26.65 KG/M2 | TEMPERATURE: 98 F | SYSTOLIC BLOOD PRESSURE: 149 MMHG | HEIGHT: 74 IN | DIASTOLIC BLOOD PRESSURE: 77 MMHG

## 2024-05-05 PROCEDURE — 99239 HOSP IP/OBS DSCHRG MGMT >30: CPT | Performed by: STUDENT IN AN ORGANIZED HEALTH CARE EDUCATION/TRAINING PROGRAM

## 2024-05-05 RX ORDER — LEVOFLOXACIN 750 MG/1
750 TABLET, FILM COATED ORAL DAILY
Qty: 4 TABLET | Refills: 0 | Status: SHIPPED | OUTPATIENT
Start: 2024-05-05 | End: 2024-05-09

## 2024-05-05 RX ORDER — METOPROLOL TARTRATE 25 MG/1
25 TABLET, FILM COATED ORAL ONCE
Status: COMPLETED | OUTPATIENT
Start: 2024-05-05 | End: 2024-05-05

## 2024-05-05 NOTE — DISCHARGE SUMMARY
[facility]     Nick Kemp Patient Status:  Inpatient    1940 MRN [id number]   Location [facility] 5NW-A Attending No att. providers found   Hosp Day # 2 PCP Toby Pantoja MD     Date of Admission: 2024  Date of Discharge:  2024     Discharge Disposition: Home or Self Care    Discharge Diagnosis:  #Acute toxic metabolic encephalopathy  #UTI  #TORI  #Hypertension  #Hyperlipidemia  #Atrial flutter    History of Present Illness: Nick Kemp is a 83 year old male with history of coronary disease, hypertension, hyperlipidemia chronic kidney disease, atrial flutter presents emergency room with altered mental status wife says he has been confused for the last 2 days and then yesterday started having some generalized weakness.  No fevers, chills, nausea, vomiting, diarrhea or constipation.  No hematochezia, melena, hematuria.  No dysuria.  No chest pain, shortness of breath, palpitations.        Brief Synopsis: Patient was started on IV antibiotics.  Blood culture showed no growth.  PT OT recommended discharge home with home health.  Patient was discharged home with outpatient follow-up.    Lace+ Score: 80  59-90 High Risk  29-58 Medium Risk  0-28   Low Risk       TCM Follow-Up Recommendation:  LACE > 58: High Risk of readmission after discharge from the hospital.      Procedures during hospitalization:   None    Incidental or significant findings and recommendations (brief descriptions):  See brief synopsis above    Lab/Test results pending at Discharge:   None    Consultants:  None    Discharge Medication List:     Discharge Medications        START taking these medications        Instructions Prescription details   levoFLOXacin 750 MG Tabs  Commonly known as: Levaquin      Take 1 tablet (750 mg total) by mouth daily for 4 days.   Stop taking on: May 9, 2024  Quantity: 4 tablet  Refills: 0            CHANGE how you take these medications        Instructions  Prescription details   atorvastatin 10 MG Tabs  Commonly known as: Lipitor  What changed: when to take this      TAKE 1 TABLET BY MOUTH EVERY DAY   Quantity: 90 tablet  Refills: 1     metoprolol succinate ER 25 MG Tb24  Commonly known as: Toprol XL  What changed: Another medication with the same name was removed. Continue taking this medication, and follow the directions you see here.      Take 1 tablet (25 mg total) by mouth 2 (two) times daily.   Quantity: 60 tablet  Refills: 1     rivaroxaban 20 MG Tabs  Commonly known as: Xarelto  What changed: when to take this      TAKE 1 TABLET BY MOUTH EVERY DAY WITH FOOD   Quantity: 90 tablet  Refills: 3            CONTINUE taking these medications        Instructions Prescription details   acetaminophen 325 MG Tabs  Commonly known as: Tylenol      Take 1 tablet (325 mg total) by mouth in the morning and 1 tablet (325 mg total) before bedtime. 1 in the morning and 1 at night.   Refills: 0     famotidine 10 MG Tabs  Commonly known as: Pepcid      Take 1 tablet (10 mg total) by mouth at bedtime.   Refills: 0     furosemide 20 MG Tabs  Commonly known as: Lasix      Take 1 tablet (20 mg total) by mouth daily as needed (leg swelling).   Refills: 0     hydrALAZINE 100 MG Tabs  Commonly known as: Apresoline      TAKE 1 TABLET BY MOUTH TWICE A DAY   Quantity: 180 tablet  Refills: 1               Where to Get Your Medications        These medications were sent to [facility]/pharmacy #[id number] - [place], [place] - [address] [phone], [phone] [address] [address]      Phone: [phone]   levoFLOXacin 750 MG Tabs         [place]    Follow-up appointment:   Toby Pantoja MD  [address] [place] [id number] [phone]    Follow up in 1 week(s)      Appointments for Next 30 Days 5/6/2024 - 6/5/2024        Date Arrival Time Visit Type Length Department Provider     5/8/2024  4:15 PM  EXAM - ESTABLISHED [0384[facility] Apolinar Covarrubias DPM    Patient Instructions:         Location Instructions:     Masks are optional for all patients and visitors, unless otherwise indicated.               2024  4:00 PM  FOLLOW UP VISIT [2828] 15 min [address] Toby Pantoja MD    Patient Instructions:         Location Instructions:     Masks are optional for all patients and visitors, unless otherwise indicated.                      Vital signs:       Physical Exam:    General: No acute distress   Lungs: clear to auscultation  Cardiovascular: S1, S2  Abdomen: Soft      -----------------------------------------------------------------------------------------------  PATIENT DISCHARGE INSTRUCTIONS: See electronic chart    Dominique Erickson DO    Total time spent on discharge plannin minutes     The  [place] Cures Act makes medical notes like these available to patients in the interest of transparency. Please be advised this is a medical document. Medical documents are intended to carry relevant information, facts as evident, and the clinical opinion of the practitioner. The medical note is intended as peer to peer communication and may appear blunt or direct. It is written in medical language and may contain abbreviations or verbiage that are unfamiliar.

## 2024-05-05 NOTE — PROGRESS NOTES
NURSING DISCHARGE NOTE    Discharged Home via Wheelchair.  Accompanied by Support staff  Belongings Taken by patient/family.    Pt discharged home. IV and tele removed. Discharge instruction reviewed with pt and pt wife. All questions answered

## 2024-05-05 NOTE — PROGRESS NOTES
[facility]   [facility]     Hospitalist Progress Note     Nick Kemp Patient Status:  Inpatient    1940 MRN [id number]   [facility] 5NW-A Attending Dominique Erickson, DO   Hosp Day # 2 PCP Toby Pantoja MD     Chief Complaint: AMS    Subjective:     Patient resting in bed and feels well     Objective:    Review of Systems:   A comprehensive review of systems was completed; pertinent positive and negatives stated in subjective.    Vital signs:  Temp:  [97.7 °F (36.5 °C)-98.2 °F (36.8 °C)] 98 °F (36.7 °C)  Pulse:  [] 49  Resp:  [18] 18  BP: (139-173)/() 149/77  SpO2:  [98 %-99 %] 98 %    Physical Exam:    General: No acute distress  Respiratory: No wheezes, no rhonchi  Cardiovascular: S1, S2, regular rate and rhythm  Abdomen: Soft, Non-tender, non-distended, positive bowel sounds  Neuro: No new focal deficits.   Extremities: No edema      Diagnostic Data:    Labs:  Recent Labs   Lab 24  2100 24  0709   WBC 30.3* 16.2*   HGB 16.0 14.0   MCV 87.4 86.4   .0 160.0       Recent Labs   Lab 24  2100 24  0710   * 98   BUN 20 17   CREATSERUM 1.33* 0.79   CA 9.6 8.5   ALB 3.1*  --    * 136   K 4.3 3.7    110   CO2 24.0 20.0*   ALKPHO 77  --    AST 20  --    ALT 20  --    BILT 1.8  --    TP 7.4  --        Estimated Creatinine Clearance: 79.5 mL/min (based on SCr of 0.79 mg/dL).    Recent Labs   Lab 24  2100   TROPHS 19       No results for input(s): \"PTP\", \"INR\" in the last 168 hours.               Microbiology    Hospital Encounter on 24   1. Blood Culture     Status: None (Preliminary result)    Collection Time: 24 11:57 PM    Specimen: Blood,peripheral   Result Value Ref Range    Blood Culture Result No Growth 2 Days N/A   2. Urine Culture, Routine     Status: Abnormal (Preliminary result)    Collection Time: 24 10:56 PM    Specimen: Urine, clean catch   Result Value Ref Range    Urine Culture  >100,000 CFU/ML Gram Negative Jovan (A) N/A         Imaging: Reviewed in Epic.    Medications:    atorvastatin  10 mg Oral Nightly    famotidine  10 mg Oral Nightly    hydrALAZINE  100 mg Oral BID    metoprolol succinate ER  25 mg Oral 2x Daily(Beta Blocker)    rivaroxaban  20 mg Oral Nightly    cefTRIAXone  1 g Intravenous Q24H       Assessment & Plan:      #Acute toxic metabolic encephalopathy  #UTI  -Urine culture growing GNR  -Blood culture NGTD   -IV antibiotics    #TORI, resolved    #Hypertension  -Hydralazine, metoprolol     #Hyperlipidemia  -Statin     #Atrial flutter  -Metoprolol, Xarelto    DC Planning     Dominique Erickson DO    Supplementary Documentation:     Quality:  DVT Mechanical Prophylaxis:        DVT Pharmacologic Prophylaxis   Medication    rivaroxaban (Xarelto) tab 20 mg                Code Status: Full Code  Spraks: External urinary catheter in place  Sparks Duration (in days):   Central line:    KRISS: 5/5/2024    Discharge is dependent on: Clinical improvement  At this point Mr. Kemp is expected to be discharge to: Home    The 21st Century Cures Act makes medical notes like these available to patients in the interest of transparency. Please be advised this is a medical document. Medical documents are intended to carry relevant information, facts as evident, and the clinical opinion of the practitioner. The medical note is intended as peer to peer communication and may appear blunt or direct. It is written in medical language and may contain abbreviations or verbiage that are unfamiliar.             **Certification      PHYSICIAN Certification of Need for Inpatient Hospitalization - Initial Certification    Patient will require inpatient services that will reasonably be expected to span two midnight's based on the clinical documentation in H+P.   Based on patients current state of illness, I anticipate that, after discharge, patient will require TBD.

## 2024-05-05 NOTE — PLAN OF CARE
Patient is alert and confused. WDL on RA. Afib on tele. Hypertensive this shift, MD notified; see orders. On xarelto. Up x1 with walker. On IV abx. IVF infusing. safety precautions in place.   Problem: MUSCULOSKELETAL - ADULT  Goal: Return mobility to safest level of function  Description: INTERVENTIONS:  - Assess patient stability and activity tolerance for standing, transferring and ambulating w/ or w/o assistive devices  - Assist with transfers and ambulation using safe patient handling equipment as needed  - Ensure adequate protection for wounds/incisions during mobilization  - Obtain PT/OT consults as needed  - Advance activity as appropriate  - Communicate ordered activity level and limitations with patient/family  Outcome: Progressing     Problem: Impaired Functional Mobility  Goal: Achieve highest/safest level of mobility/gait  Description: Interventions:  - Assess patient's functional ability and stability  - Promote increasing activity/tolerance for mobility and gait  - Educate and engage patient/family in tolerated activity level and precautions    Outcome: Progressing     Problem: Impaired Activities of Daily Living  Goal: Achieve highest/safest level of independence in self care  Description: Interventions:  - Assess ability and encourage patient to participate in ADLs to maximize function  - Promote sitting position while performing ADLs such as feeding, grooming, and bathing  - Educate and encourage patient/family in tolerated functional activity level and precautions during self-care    Outcome: Progressing     Problem: Impaired Cognition  Goal: Patient will exhibit improved attention, thought processing and/or memory  Description: Interventions:    Outcome: Progressing     Problem: SAFETY ADULT - FALL  Goal: Free from fall injury  Description: INTERVENTIONS:  - Assess pt frequently for physical needs  - Identify cognitive and physical deficits and behaviors that affect risk of falls.  - [place]  fall precautions as indicated by assessment.  - Educate pt/family on patient safety including physical limitations  - Instruct pt to call for assistance with activity based on assessment  - Modify environment to reduce risk of injury  - Provide assistive devices as appropriate  - Consider OT/PT consult to assist with strengthening/mobility  - Encourage toileting schedule  Outcome: Progressing     Problem: Patient/Family Goals  Goal: Patient/Family Long Term Goal  Description: Patient's Long Term Goal: discharge home with appropriate means    Interventions:  - PT/OT  - See additional Care Plan goals for specific interventions  Outcome: Progressing  Goal: Patient/Family Short Term Goal  Description: Patient's Short Term Goal:   5/3 AM: work with PT  5/3 noc: safety/comfort  5/4 AM: sit up in chairs for meals  5/4 noc: manage BP    Interventions:   - freq rounds, provide quiet environment  - antihypertensives  - See additional Care Plan goals for specific interventions  Outcome: Progressing

## 2024-05-06 ENCOUNTER — PATIENT OUTREACH (OUTPATIENT)
Dept: CASE MANAGEMENT | Age: 84
End: 2024-05-06

## 2024-05-06 ENCOUNTER — TELEPHONE (OUTPATIENT)
Dept: INTERNAL MEDICINE CLINIC | Facility: CLINIC | Age: 84
End: 2024-05-06

## 2024-05-06 DIAGNOSIS — A41.9 SEPSIS DUE TO URINARY TRACT INFECTION (HCC): Primary | ICD-10-CM

## 2024-05-06 DIAGNOSIS — N39.0 SEPSIS DUE TO URINARY TRACT INFECTION (HCC): Primary | ICD-10-CM

## 2024-05-06 DIAGNOSIS — Z02.9 ENCOUNTERS FOR ADMINISTRATIVE PURPOSE: ICD-10-CM

## 2024-05-06 PROCEDURE — 1111F DSCHRG MED/CURRENT MED MERGE: CPT

## 2024-05-06 NOTE — TELEPHONE ENCOUNTER
KATERIN, Spoke to patient's wife for TCM today.  Patient has an appt on 5/23/24 and wife states that he is doing well and does not feel a sooner one is necessary.  TCM appointment recommended by 5/12/24 as patient is a High risk for readmission.      BOOK BY DATE (last date for TCM): 5/19/24

## 2024-05-08 ENCOUNTER — OFFICE VISIT (OUTPATIENT)
Dept: PODIATRY CLINIC | Facility: CLINIC | Age: 84
End: 2024-05-08

## 2024-05-08 DIAGNOSIS — M79.675 TOE PAIN, BILATERAL: ICD-10-CM

## 2024-05-08 DIAGNOSIS — L84 PRE-ULCERATIVE CALLUSES: ICD-10-CM

## 2024-05-08 DIAGNOSIS — M20.42 HAMMER TOES OF BOTH FEET: ICD-10-CM

## 2024-05-08 DIAGNOSIS — M79.674 TOE PAIN, BILATERAL: ICD-10-CM

## 2024-05-08 DIAGNOSIS — M77.42 METATARSALGIA OF BOTH FEET: ICD-10-CM

## 2024-05-08 DIAGNOSIS — M20.41 HAMMER TOES OF BOTH FEET: ICD-10-CM

## 2024-05-08 DIAGNOSIS — B35.1 ONYCHOMYCOSIS: Primary | ICD-10-CM

## 2024-05-08 DIAGNOSIS — N18.30 STAGE 3 CHRONIC KIDNEY DISEASE, UNSPECIFIED WHETHER STAGE 3A OR 3B CKD (HCC): ICD-10-CM

## 2024-05-08 DIAGNOSIS — M77.41 METATARSALGIA OF BOTH FEET: ICD-10-CM

## 2024-05-08 DIAGNOSIS — L84 FOOT CALLUS: ICD-10-CM

## 2024-05-08 PROCEDURE — 99213 OFFICE O/P EST LOW 20 MIN: CPT | Performed by: STUDENT IN AN ORGANIZED HEALTH CARE EDUCATION/TRAINING PROGRAM

## 2024-05-09 NOTE — PROGRESS NOTES
[facility] Podiatry  Progress Note    Nick Kemp is a 83 year old male.   Chief Complaint   Patient presents with    Toenail Care     Nail care and foot check         HPI:     Patient is a pleasant 83-year-old male with past medical history of CKD who presents to clinic for foot exam.  He has elongated nails and thickened calluses he has difficulty trimming on his own.  He also has preulcerative calluses to his plantar feet that start to cause pain at times.  He continues to ambulate with supportive new balance tennis shoes.  No other complaints are mentioned at this time. He was recently hospitalized for UTI but is now doing better. Past medical history, medications, and allergies reviewed.        Allergies: Patient has no known allergies.   Current Outpatient Medications   Medication Sig Dispense Refill    levoFLOXacin 750 MG Oral Tab Take 1 tablet (750 mg total) by mouth daily for 4 days. 4 tablet 0    HYDRALAZINE 100 MG Oral Tab TAKE 1 TABLET BY MOUTH TWICE A  tablet 1    ATORVASTATIN 10 MG Oral Tab TAKE 1 TABLET BY MOUTH EVERY DAY (Patient taking differently: Take 1 tablet (10 mg total) by mouth nightly.) 90 tablet 1    furosemide 20 MG Oral Tab Take 1 tablet (20 mg total) by mouth daily as needed (leg swelling).      metoprolol succinate ER 25 MG Oral Tablet 24 Hr Take 1 tablet (25 mg total) by mouth 2 (two) times daily. 60 tablet 1    acetaminophen 325 MG Oral Tab Take 1 tablet (325 mg total) by mouth in the morning and 1 tablet (325 mg total) before bedtime. 1 in the morning and 1 at night.      rivaroxaban (XARELTO) 20 MG Oral Tab TAKE 1 TABLET BY MOUTH EVERY DAY WITH FOOD (Patient taking differently: at bedtime. TAKE 1 TABLET BY MOUTH EVERY DAY WITH FOOD) 90 tablet 3    famotidine 10 MG Oral Tab Take 1 tablet (10 mg total) by mouth at bedtime.        Past Medical History:    Anesthesia complication    Aortic insufficiency    Arrhythmia    Atrial flutter (HCC)    Bad breath    possibly  d/t GERD    Cancer (HCC)    basal cell skin cancer    Cataract    Cholelithiasis    Coronary atherosclerosis of unspecified type of vessel, native or graft    Difficult intubation    Dr. Sol gave letter - will bring with him    DVT (deep venous thrombosis) (HCC)    Earache    secondary to TMJ    Esophageal reflux    Hearing impairment    HEART ATTACK    HIGH BLOOD PRESSURE    HIGH CHOLESTEROL    Hypercholesterolemia    Long term (current) use of anticoagulants    Nephrotic syndrome    Other and unspecified hyperlipidemia    PVC's (premature ventricular contractions)    Renal disorder    Shortness of breath    Syncope    Testicular pain    likely from under treated epididymitis    Unspecified essential hypertension    Visual impairment    glasses      Past Surgical History:   Procedure Laterality Date    Cabg      triple bypass    Cataract      Cath percutaneous  transluminal coronary angioplasty      early      Cholecystectomy      Colonoscopy  2004, 2011    x2    Open heart surgery (pbp)      Skin surgery Right 14    Excision for BCC, nodular to right upper back, [place]    Skin surgery Left 14    MMS for BCC-nod to the L Nasal Supratip    Skin surgery  16    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    Splint, hammer toe      Tonsillectomy        Family History   Problem Relation Age of Onset    Heart Disease Father     Other (Jacqueline Gehrig's disease) Brother       Social History     Socioeconomic History    Marital status:     Number of children: 1   Occupational History    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    Tobacco Use    Smoking status: Former     Current packs/day: 0.00     Types: Cigarettes     Quit date: 1976     Years since quittin.8    Smokeless tobacco: Never   Vaping Use    Vaping status: Never Used   Substance and Sexual Activity    Alcohol use: Yes     Comment: occasionally    Drug use: No   Other Topics Concern    Caffeine Concern Yes     Comment: 4-5  coffees, tea once a day    Exercise No           REVIEW OF SYSTEMS:     Today reviewed systems as documented below  GENERAL HEALTH: feels well otherwise  SKIN: denies any unusual skin lesions or rashes  RESPIRATORY: denies shortness of breath with exertion  CARDIOVASCULAR: denies chest pain on exertion  GI: denies abdominal pain and denies heartburn  NEURO: denies headaches  MUSCULO: denies arthritis, back pain      EXAM:   There were no vitals taken for this visit.  GENERAL: well developed, well nourished, in no apparent distress  EXTREMITIES:   1. Integument: Normal skin temperature and turgor.  Nails x10 are elongated, thickened, dystrophic, and with subungual debris.  HPK that is pre ulcerative noted subfirst, third, and fifth metatarsal head bilaterally.  2. Vascular: Pedal pulses are palpable, capillary refill normal.   3. Musculoskeletal: All muscle groups are graded 5 out of 5 in the foot and ankle.  Flexion contracture of lesser digits appreciated.  Prominent metatarsal heads noted bilaterally.   4. Neurological: Normal sharp dull sensation; reflexes normal.          ASSESSMENT AND PLAN:   Diagnoses and all orders for this visit:    Onychomycosis    Pre-ulcerative calluses    Hammer toes of both feet    Metatarsalgia of both feet    Toe pain, bilateral    Stage 3 chronic kidney disease, unspecified whether stage 3a or 3b CKD (HCC)    Foot callus        Plan:     -Patient examined, chart history reviewed.  -Discussed importance of proper pedal hygiene, regular foot checks, and tight glucose control.  -Sharply debrided nails x10 with a sterile nail nipper achieving a 20% reduction in thickness and length, without incident. Nails further smoothed with dremel. Pinpoint bleeding noted that was controlled with silver nitrate. Can monitor for routine healing.  -Pared HPKs x2 with #15 blade to healthy tissue without incident.  Patient to apply urea cream to site between visits to prevent buildup and ambulate with  supportive shoes and avoid walking barefoot.  -Educated patient on acute signs of infection advised patient to seek immediate medical attention if symptoms arise.    The patient indicates understanding of these issues and agrees to the plan.    RTC 2 months.    Apolinar Covarrubias DPM

## 2024-05-15 ENCOUNTER — OFFICE VISIT (OUTPATIENT)
Dept: INTERNAL MEDICINE CLINIC | Facility: CLINIC | Age: 84
End: 2024-05-15

## 2024-05-15 VITALS
HEART RATE: 78 BPM | OXYGEN SATURATION: 98 % | TEMPERATURE: 98 F | DIASTOLIC BLOOD PRESSURE: 76 MMHG | SYSTOLIC BLOOD PRESSURE: 130 MMHG | RESPIRATION RATE: 14 BRPM

## 2024-05-15 DIAGNOSIS — N18.30 STAGE 3 CHRONIC KIDNEY DISEASE, UNSPECIFIED WHETHER STAGE 3A OR 3B CKD (HCC): Primary | ICD-10-CM

## 2024-05-15 DIAGNOSIS — I50.32 CHRONIC DIASTOLIC HEART FAILURE (HCC): ICD-10-CM

## 2024-05-15 DIAGNOSIS — I10 BENIGN ESSENTIAL HTN: ICD-10-CM

## 2024-05-15 DIAGNOSIS — A41.9 SEPSIS DUE TO URINARY TRACT INFECTION (HCC): ICD-10-CM

## 2024-05-15 DIAGNOSIS — K21.9 GASTROESOPHAGEAL REFLUX DISEASE, UNSPECIFIED WHETHER ESOPHAGITIS PRESENT: ICD-10-CM

## 2024-05-15 DIAGNOSIS — N39.0 SEPSIS DUE TO URINARY TRACT INFECTION (HCC): ICD-10-CM

## 2024-05-15 PROCEDURE — 99495 TRANSJ CARE MGMT MOD F2F 14D: CPT | Performed by: PHYSICIAN ASSISTANT

## 2024-05-15 NOTE — PROGRESS NOTES
Subjective:   Nick Kemp is a 83 year old male who presents for hospital follow up.   He was discharged from [facility] to Home Health Care Services  Admission Date: 5/2/24   Discharge Date: 5/5/24  Hospital Discharge Diagnosis: #Acute toxic metabolic encephalopathy  #UTI  #TORI  #Hypertension  #Hyperlipidemia  #Atrial flutter    Interactive contact within 2 business days post discharge first initiated on Date: 5/6/2024    During the visit, the following was completed:  Obtained and reviewed discharge summary, continuity of care documents, and Hospitalist notes  Reviewed Labs (CBC, CMP)    HPI:       Brief Synopsis: Patient was started on IV antibiotics.  Blood culture showed no growth.  PT OT recommended discharge home with home health.  Patient was discharged home with outpatient follow-up.    Since discharge: patient feeling well. He reports history with help of his wife. They state he completed his outpatient antibiotic. Denies dysuria, hesitancy, frequency. Has some swelling in ankles that is chronic. Reports weight is stable.    Takes famotidine most nights for mild GERD. Pt states it completely resolves his symptoms. However wife wonders if this could be the cause of loose stools he has in the am. Denies diarrhea, melena, blood in stool.    History/Other:   Current Medications:  Medication Reconciliation:  I am aware of an inpatient discharge within the last 30 days.  The discharge medication list has been reconciled with the patient's current medication list and reviewed by me.  See medication list for additions of new medication, and changes to current doses of medications and discontinued medications.  Outpatient Medications Marked as Taking for the 5/15/24 encounter (Office Visit) with Margo Garcia PA-C   Medication Sig    HYDRALAZINE 100 MG Oral Tab TAKE 1 TABLET BY MOUTH TWICE A DAY    ATORVASTATIN 10 MG Oral Tab TAKE 1 TABLET BY MOUTH EVERY DAY (Patient taking differently: Take 1 tablet  (10 mg total) by mouth nightly.)    furosemide 20 MG Oral Tab Take 1 tablet (20 mg total) by mouth daily as needed (leg swelling).    metoprolol succinate ER 25 MG Oral Tablet 24 Hr Take 1 tablet (25 mg total) by mouth 2 (two) times daily.    acetaminophen 325 MG Oral Tab Take 1 tablet (325 mg total) by mouth in the morning and 1 tablet (325 mg total) before bedtime. 1 in the morning and 1 at night.    rivaroxaban (XARELTO) 20 MG Oral Tab TAKE 1 TABLET BY MOUTH EVERY DAY WITH FOOD (Patient taking differently: at bedtime. TAKE 1 TABLET BY MOUTH EVERY DAY WITH FOOD)    famotidine 10 MG Oral Tab Take 1 tablet (10 mg total) by mouth at bedtime.       Review of Systems:  GENERAL: weight stable, energy stable, no sweating  SKIN: denies any unusual skin lesions  EYES: denies blurred vision or double vision  HEENT: denies nasal congestion, sinus pain or ST  LUNGS: denies shortness of breath with exertion  CARDIOVASCULAR: denies chest pain on exertion or palpitations  GI: denies abdominal pain, denies heartburn, denies diarrhea  MUSCULOSKELETAL: denies pain, normal range of motion of extremities  NEURO: denies headaches, denies dizziness, denies weakness  PSYCHE: denies depression or anxiety  HEMATOLOGIC: denies hx of anemia, or bruising, denies bleeding  ENDOCRINE: denies thyroid history  ALL/ASTHMA: denies hx of allergy or asthma    Objective:   No LMP for male patient.  Estimated body mass index is 26.65 kg/m² as calculated from the following:    Height as of 5/2/24: 6' 2\" (1.88 m).    Weight as of 5/3/24: 207 lb 9.6 oz (94.2 kg).   /76   Pulse 78   Temp 97.9 °F (36.6 °C)   Resp 14   SpO2 98%    GENERAL: well developed, well nourished, in no apparent distress  SKIN: no rashes, no suspicious lesions  HEENT: atraumatic, normocephalic, ears and throat are clear  EYES: PERRLA, EOMI, conjunctiva are clear  NECK: supple, no adenopathy, no bruits  CHEST: no chest tenderness  LUNGS: clear to auscultation  CARDIO: RRR  without murmur  GI: good BS's, no masses, HSM or tenderness  MUSCULOSKELETAL: back is not tender, FROM of the extremities  EXTREMITIES: no cyanosis, clubbing or edema  NEURO: Oriented times three, cranial nerves are intact, motor and sensory are grossly intact    Assessment & Plan:   1. Stage 3 chronic kidney disease, unspecified whether stage 3a or 3b CKD (HCC) (Primary)- creatinine back to baseline prior to discharge   2. Sepsis due to urinary tract infection (HCC)- resolved s/p antibiotics. Follows with urology.  3. Chronic diastolic heart failure (HCC)- compensated cardiac status. Monitor.  4. Benign essential HTN- at goal cpm   5. Gastroesophageal reflux disease, unspecified whether esophagitis present- well controlled on famotidine prn but wife thinks this could be causing bowel irregularity. Switch from otc famotidine to otc prilosec to see if loose stools resolve        Return for appointment as scheduled. 5/23/2024 with Dr Pantoja.

## 2024-05-15 NOTE — PATIENT INSTRUCTIONS
Try omeprazole (prilosec) instead of famotidine, over-the-counter, once or twice a day as needed for heartburn

## 2024-05-21 DIAGNOSIS — I10 ESSENTIAL HYPERTENSION: ICD-10-CM

## 2024-05-21 RX ORDER — HYDRALAZINE HYDROCHLORIDE 100 MG/1
TABLET, FILM COATED ORAL
Qty: 180 TABLET | Refills: 0 | Status: SHIPPED | OUTPATIENT
Start: 2024-05-21 | End: 2024-05-23

## 2024-05-21 NOTE — TELEPHONE ENCOUNTER
Last time medication was refilled: 01/22/2024  Last office visit: 05/15/2024  Next office visit due/scheduled: 05/23/2024    Medication protocol passed, refill sent.

## 2024-05-23 ENCOUNTER — OFFICE VISIT (OUTPATIENT)
Dept: INTERNAL MEDICINE CLINIC | Facility: CLINIC | Age: 84
End: 2024-05-23

## 2024-05-23 VITALS
OXYGEN SATURATION: 98 % | WEIGHT: 207 LBS | DIASTOLIC BLOOD PRESSURE: 52 MMHG | BODY MASS INDEX: 27 KG/M2 | TEMPERATURE: 98 F | HEART RATE: 44 BPM | SYSTOLIC BLOOD PRESSURE: 102 MMHG | RESPIRATION RATE: 16 BRPM

## 2024-05-23 DIAGNOSIS — I48.92 ATRIAL FLUTTER, PAROXYSMAL (HCC): ICD-10-CM

## 2024-05-23 DIAGNOSIS — I10 BENIGN ESSENTIAL HTN: Primary | ICD-10-CM

## 2024-05-23 DIAGNOSIS — E78.2 MIXED HYPERLIPIDEMIA: ICD-10-CM

## 2024-05-23 DIAGNOSIS — R00.1 BRADYCARDIA: ICD-10-CM

## 2024-05-23 PROBLEM — A41.9 SEPSIS DUE TO URINARY TRACT INFECTION (HCC): Status: RESOLVED | Noted: 2024-05-02 | Resolved: 2024-05-23

## 2024-05-23 PROBLEM — N30.00 ACUTE CYSTITIS WITHOUT HEMATURIA: Status: RESOLVED | Noted: 2024-05-03 | Resolved: 2024-05-23

## 2024-05-23 PROBLEM — N39.0 SEPSIS DUE TO URINARY TRACT INFECTION (HCC): Status: RESOLVED | Noted: 2024-05-02 | Resolved: 2024-05-23

## 2024-05-23 PROCEDURE — 99214 OFFICE O/P EST MOD 30 MIN: CPT | Performed by: INTERNAL MEDICINE

## 2024-05-23 RX ORDER — HYDRALAZINE HYDROCHLORIDE 50 MG/1
50 TABLET, FILM COATED ORAL 2 TIMES DAILY
COMMUNITY
Start: 2024-05-23

## 2024-05-23 RX ORDER — ROSUVASTATIN CALCIUM 20 MG/1
20 TABLET, COATED ORAL DAILY
Qty: 90 TABLET | Refills: 3 | Status: SHIPPED | OUTPATIENT
Start: 2024-05-23

## 2024-05-23 NOTE — PROGRESS NOTES
Subjective:   Patient ID: Nick Kemp is a 83 year old male.    HPI  Nick Kemp is a 83 year old male.     HPI:   Patient presents for recheck of his hypertension, HLD and CAD,, PAF   Pt has been taking medications as instructed, no medication side effects, home BP monitoring in the range of 130's systolic and 80's diastolic.  No cp or sob  Left leg edema. No sob .   Wt Readings from Last 6 Encounters:   05/23/24 207 lb (93.9 kg)   05/03/24 207 lb 9.6 oz (94.2 kg)   12/27/23 207 lb (93.9 kg)   12/11/23 203 lb (92.1 kg)   11/13/23 203 lb (92.1 kg)   10/10/23 203 lb (92.1 kg)     Body mass index is 26.58 kg/m².    Lab Results   Component Value Date    CHOLEST 133 10/18/2023    CHOLEST 147 05/06/2021    CHOLEST 155 11/12/2020     Lab Results   Component Value Date    HDL 41 10/18/2023    HDL 37 (L) 05/06/2021    HDL 39 (L) 11/12/2020     Lab Results   Component Value Date    TRIG 140 10/18/2023    TRIG 124 05/06/2021    TRIG 190 (H) 11/12/2020    TRIGLY 116 04/16/2015     Lab Results   Component Value Date    LDL 67 10/18/2023    LDL 85 05/06/2021    LDL 78 11/12/2020     Lab Results   Component Value Date    AST 20 05/02/2024    AST 20 12/28/2023    AST 10 (L) 12/27/2023     Lab Results   Component Value Date    ALT 20 05/02/2024    ALT 18 12/28/2023    ALT 27 12/27/2023     Lab Results   Component Value Date    GLU 98 05/04/2024     (H) 05/02/2024     (H) 12/30/2023    GLUCOSE 103 (H) 04/16/2015    GLUCOSE 96 10/23/2014       Current Outpatient Medications   Medication Sig Dispense Refill    hydrALAZINE 50 MG Oral Tab Take 1 tablet (50 mg total) by mouth in the morning and 1 tablet (50 mg total) before bedtime.      ATORVASTATIN 10 MG Oral Tab TAKE 1 TABLET BY MOUTH EVERY DAY (Patient taking differently: Take 1 tablet (10 mg total) by mouth nightly.) 90 tablet 1    furosemide 20 MG Oral Tab Take 1 tablet (20 mg total) by mouth daily as needed (leg swelling).      metoprolol succinate ER  25 MG Oral Tablet 24 Hr Take 1 tablet (25 mg total) by mouth 2 (two) times daily. 60 tablet 1    acetaminophen 325 MG Oral Tab Take 1 tablet (325 mg total) by mouth in the morning and 1 tablet (325 mg total) before bedtime. 1 in the morning and 1 at night.      rivaroxaban (XARELTO) 20 MG Oral Tab TAKE 1 TABLET BY MOUTH EVERY DAY WITH FOOD (Patient taking differently: at bedtime. TAKE 1 TABLET BY MOUTH EVERY DAY WITH FOOD) 90 tablet 3    famotidine 10 MG Oral Tab Take 1 tablet (10 mg total) by mouth at bedtime.        Past Medical History:    Anesthesia complication    Aortic insufficiency    Arrhythmia    Atrial flutter (HCC)    Bad breath    possibly d/t GERD    Cancer (HCC)    basal cell skin cancer    Cataract    Cholelithiasis    Coronary atherosclerosis of unspecified type of vessel, native or graft    Difficult intubation    Dr. Sol gave letter - will bring with him    DVT (deep venous thrombosis) (HCC)    Earache    secondary to TMJ    Esophageal reflux    Hearing impairment    HEART ATTACK    HIGH BLOOD PRESSURE    HIGH CHOLESTEROL    Hypercholesterolemia    Long term (current) use of anticoagulants    Nephrotic syndrome    Other and unspecified hyperlipidemia    PVC's (premature ventricular contractions)    Renal disorder    Shortness of breath    Syncope    Testicular pain    likely from under treated epididymitis    Unspecified essential hypertension    Visual impairment    glasses      Past Surgical History:   Procedure Laterality Date    Cabg  1993    triple bypass    Cataract      Cath percutaneous  transluminal coronary angioplasty      early 1990's     Cholecystectomy      Colonoscopy  9/8/2004, 2011    x2    Open heart surgery (pbp)      Skin surgery Right 8-11-14    Excision for BCC, nodular to right upper back, [place]    Skin surgery Left 8/21/14    MMS for BCC-nod to the L Nasal Supratip    Skin surgery  9/16/16    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    Splint, hammer toe      Tonsillectomy        Social  History:    Social History     Socioeconomic History    Marital status:     Number of children: 1   Occupational History    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    Tobacco Use    Smoking status: Former     Current packs/day: 0.00     Types: Cigarettes     Quit date: 1976     Years since quittin.9    Smokeless tobacco: Never   Vaping Use    Vaping status: Never Used   Substance and Sexual Activity    Alcohol use: Yes     Comment: occasionally    Drug use: No   Other Topics Concern    Caffeine Concern Yes     Comment: 4-5 coffees, tea once a day    Exercise No     Social Determinants of Health     Financial Resource Strain: Low Risk  (2024)    Financial Resource Strain     Difficulty of Paying Living Expenses: Not hard at all     Med Affordability: No   Food Insecurity: No Food Insecurity (5/3/2024)    Food Insecurity     Food Insecurity: Never true   Transportation Needs: No Transportation Needs (5/3/2024)    Transportation Needs     Lack of Transportation: No   Housing Stability: Low Risk  (5/3/2024)    Housing Stability     Housing Instability: No         REVIEW OF SYSTEMS:   GENERAL HEALTH: feels well otherwise  SKIN: denies any unusual skin lesions or rashes  RESPIRATORY: denies shortness of breath with exertion  CARDIOVASCULAR: denies chest pain on exertion  GI: denies abdominal pain and denies heartburn  NEURO: denies headaches    EXAM:   /52   Pulse (!) 44   Temp 98 °F (36.7 °C)   Resp 16   Wt 207 lb (93.9 kg)   SpO2 98%   BMI 26.58 kg/m²   GENERAL: well developed, well nourished,in no apparent distress  SKIN: no rashes,no suspicious lesions  HEENT: atraumatic, normocephalic,ears and throat are clear  NECK: supple,no adenopathy,no bruits  LUNGS: clear to auscultation  CARDIO: RRR without murmur  GI: good BS's,no masses, HSM or tenderness  EXTREMITIES: no cyanosis, clubbing or edema    ASSESSMENT AND PLAN:   Pt presents for a recheck of his hypertension, HLD  and CAD and PAF. BP is well controlled, no significant medication side effects noted.  PLAN: will continue present medications, reviewed diet, exercise and weight control, very strongly urged patient to quit smoking.   PAF- cont doac, rate controlled  CAD- stable. continue control of cad risk factors  Refer to cardio for tachy -vincent    The patient indicates understanding of these issues and agrees to the plan.  The patient is asked to return in 6 m.    History/Other:   Review of Systems  Current Outpatient Medications   Medication Sig Dispense Refill    hydrALAZINE 50 MG Oral Tab Take 1 tablet (50 mg total) by mouth in the morning and 1 tablet (50 mg total) before bedtime.      ATORVASTATIN 10 MG Oral Tab TAKE 1 TABLET BY MOUTH EVERY DAY (Patient taking differently: Take 1 tablet (10 mg total) by mouth nightly.) 90 tablet 1    furosemide 20 MG Oral Tab Take 1 tablet (20 mg total) by mouth daily as needed (leg swelling).      metoprolol succinate ER 25 MG Oral Tablet 24 Hr Take 1 tablet (25 mg total) by mouth 2 (two) times daily. 60 tablet 1    acetaminophen 325 MG Oral Tab Take 1 tablet (325 mg total) by mouth in the morning and 1 tablet (325 mg total) before bedtime. 1 in the morning and 1 at night.      rivaroxaban (XARELTO) 20 MG Oral Tab TAKE 1 TABLET BY MOUTH EVERY DAY WITH FOOD (Patient taking differently: at bedtime. TAKE 1 TABLET BY MOUTH EVERY DAY WITH FOOD) 90 tablet 3    famotidine 10 MG Oral Tab Take 1 tablet (10 mg total) by mouth at bedtime.       Allergies:No Known Allergies    Objective:   Physical Exam    Assessment & Plan:   1. Benign essential HTN    2. Atrial flutter, paroxysmal (HCC)    3. Mixed hyperlipidemia        No orders of the defined types were placed in this encounter.      Meds This Visit:  Requested Prescriptions      No prescriptions requested or ordered in this encounter       Imaging & Referrals:  None

## 2024-06-10 ENCOUNTER — TELEPHONE (OUTPATIENT)
Dept: PODIATRY CLINIC | Facility: CLINIC | Age: 84
End: 2024-06-10

## 2024-06-10 NOTE — TELEPHONE ENCOUNTER
Spoke with patient and his wife. They state that the callus in question has been there a while. It is on the left foot, bottom below middle toe. They report dried blood, mainly on the bandaid, but not much. State the whole foot is swollen, but deny pus, exudation aside from scant blood, redness, heat, fever, chills. I stressed if experiences any to seek urgent care. There is a hard shell on the callus. They are using peroxide, aquaphor, small bandaid and moleskin. Appointment on 7/26/24.

## 2024-06-10 NOTE — TELEPHONE ENCOUNTER
Teresita appointment request received from pt.  Pt is having bleeding from callus bottom of left foot.  Pt requesting a sooner appointment.  No appointments available.  Please call pt

## 2024-06-14 NOTE — TELEPHONE ENCOUNTER
Called patient and offered appointment on 6/18 but he declined. Appointment scheduled on 6/20 at 1pm. Advised if he develops any redness, swelling he should go to UC or ER. Call back if any concerns. He verbalized understanding.

## 2024-06-14 NOTE — TELEPHONE ENCOUNTER
I apologize I could not get in sooner this week. Can try and see the week I am back or if there are any sooner appts with other providers that would be great as well. Thanks.

## 2024-06-20 ENCOUNTER — OFFICE VISIT (OUTPATIENT)
Dept: PODIATRY CLINIC | Facility: CLINIC | Age: 84
End: 2024-06-20

## 2024-06-20 DIAGNOSIS — B35.1 ONYCHOMYCOSIS: Primary | ICD-10-CM

## 2024-06-20 DIAGNOSIS — L97.521 ULCER OF LEFT FOOT, LIMITED TO BREAKDOWN OF SKIN (HCC): ICD-10-CM

## 2024-06-20 DIAGNOSIS — N18.30 STAGE 3 CHRONIC KIDNEY DISEASE, UNSPECIFIED WHETHER STAGE 3A OR 3B CKD (HCC): ICD-10-CM

## 2024-06-20 PROCEDURE — 99213 OFFICE O/P EST LOW 20 MIN: CPT | Performed by: PODIATRIST

## 2024-06-20 NOTE — PROGRESS NOTES
Nick Kemp is a 83 year old male.   Chief Complaint   Patient presents with    Callus     Callus left foot - denies pain         HPI:   Returns to the clinic with a complaint of a painful callus on his left foot his wife has been dressing it but evidently there is some hemorrhage.  At today's visit reviewed nurse's history as taken above, allergies medications and medical history as documented below.  All changes duly noted  Allergies: Patient has no known allergies.   Current Outpatient Medications   Medication Sig Dispense Refill    hydrALAZINE 50 MG Oral Tab Take 1 tablet (50 mg total) by mouth in the morning and 1 tablet (50 mg total) before bedtime.      rosuvastatin (CRESTOR) 20 MG Oral Tab Take 1 tablet (20 mg total) by mouth daily. 90 tablet 3    furosemide 20 MG Oral Tab Take 1 tablet (20 mg total) by mouth daily as needed (leg swelling).      metoprolol succinate ER 25 MG Oral Tablet 24 Hr Take 1 tablet (25 mg total) by mouth 2 (two) times daily. 60 tablet 1    acetaminophen 325 MG Oral Tab Take 1 tablet (325 mg total) by mouth in the morning and 1 tablet (325 mg total) before bedtime. 1 in the morning and 1 at night.      rivaroxaban (XARELTO) 20 MG Oral Tab TAKE 1 TABLET BY MOUTH EVERY DAY WITH FOOD (Patient taking differently: at bedtime. TAKE 1 TABLET BY MOUTH EVERY DAY WITH FOOD) 90 tablet 3    famotidine 10 MG Oral Tab Take 1 tablet (10 mg total) by mouth at bedtime.        Past Medical History:    Anesthesia complication    Aortic insufficiency    Arrhythmia    Atrial flutter (HCC)    Bad breath    possibly d/t GERD    Cancer (HCC)    basal cell skin cancer    Cataract    Cholelithiasis    Coronary atherosclerosis of unspecified type of vessel, native or graft    Difficult intubation    Dr. Sol gave letter - will bring with him    DVT (deep venous thrombosis) (HCC)    Earache    secondary to TMJ    Esophageal reflux    Hearing impairment    HEART ATTACK    HIGH BLOOD PRESSURE    HIGH  CHOLESTEROL    Hypercholesterolemia    Long term (current) use of anticoagulants    Nephrotic syndrome    Other and unspecified hyperlipidemia    PVC's (premature ventricular contractions)    Renal disorder    Shortness of breath    Syncope    Testicular pain    likely from under treated epididymitis    Unspecified essential hypertension    Visual impairment    glasses      Past Surgical History:   Procedure Laterality Date    Cabg      triple bypass    Cataract      Cath percutaneous  transluminal coronary angioplasty      early      Cholecystectomy      Colonoscopy  2004, 2011    x2    Open heart surgery (pbp)      Skin surgery Right 14    Excision for BCC, nodular to right upper back, [place]    Skin surgery Left 14    MMS for BCC-nod to the L Nasal Supratip    Skin surgery  16    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    Splint, hammer toe      Tonsillectomy        Family History   Problem Relation Age of Onset    Heart Disease Father     Other (Jacqueline Gehrig's disease) Brother       Social History     Socioeconomic History    Marital status:     Number of children: 1   Occupational History    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    Tobacco Use    Smoking status: Former     Current packs/day: 0.00     Types: Cigarettes     Quit date: 1976     Years since quittin.9    Smokeless tobacco: Never   Vaping Use    Vaping status: Never Used   Substance and Sexual Activity    Alcohol use: Yes     Comment: occasionally    Drug use: No   Other Topics Concern    Caffeine Concern Yes     Comment: 4-5 coffees, tea once a day    Exercise No           REVIEW OF SYSTEMS:   Today reviewed systens as documented below  GENERAL HEALTH: feels well otherwise  SKIN: Refer to exam below  RESPIRATORY: denies shortness of breath with exertion  CARDIOVASCULAR: denies chest pain on exertion  GI: denies abdominal pain and denies heartburn  NEURO: denies headaches    EXAM:   There were no vitals  taken for this visit.  GENERAL: well developed, well nourished, in no apparent distress  EXTREMITIES:   1. Integument: The skin on his left foot was evaluated is warm and dry he has hyperkeratosis underneath the first metatarsal head which is not ulcerated and he has 1 underneath the third metatarsal head which after trimming shows a small superficial ulceration limited to the breakdown of skin no exposed fat layer no purulent drainage noted.   2. Vascular: Patient has weakly palpable pulses on the left   3. Neurologic: Patient has a little bit of diminished pedal sensation sensorium   4. Musculoskeletal: Patient has atrophy of the fat pad with mild anterior displacement because of hammertoes.    ASSESSMENT AND PLAN:   Diagnoses and all orders for this visit:    Onychomycosis    Stage 3 chronic kidney disease, unspecified whether stage 3a or 3b CKD (HCC)    Ulcer of left foot, limited to breakdown of skin (HCC)        Plan: Today trimmed down debrided all hyperkeratotic tissue there was no hemorrhaging.  This was done using a #15 blade.  Band-Aid antibiotic ointment applied patient will apply mupirocin ointment once daily after showering or bathing apply a Band-Aid he was told to stay off his left foot and follow-up with Dr. Covarrubias in about 10 days.  He was cautioned on signs of infection present to the emergency room or urgent care should they happen.    The patient indicates understanding of these issues and agrees to the plan.    Nick Thomas DPM

## 2024-07-05 ENCOUNTER — OFFICE VISIT (OUTPATIENT)
Dept: PODIATRY CLINIC | Facility: CLINIC | Age: 84
End: 2024-07-05

## 2024-07-05 DIAGNOSIS — L84 FOOT CALLUS: ICD-10-CM

## 2024-07-05 DIAGNOSIS — M77.42 METATARSALGIA OF BOTH FEET: ICD-10-CM

## 2024-07-05 DIAGNOSIS — M20.41 HAMMER TOES OF BOTH FEET: ICD-10-CM

## 2024-07-05 DIAGNOSIS — L97.521 ULCER OF LEFT FOOT, LIMITED TO BREAKDOWN OF SKIN (HCC): ICD-10-CM

## 2024-07-05 DIAGNOSIS — L84 PRE-ULCERATIVE CALLUSES: Primary | ICD-10-CM

## 2024-07-05 DIAGNOSIS — N18.30 STAGE 3 CHRONIC KIDNEY DISEASE, UNSPECIFIED WHETHER STAGE 3A OR 3B CKD (HCC): ICD-10-CM

## 2024-07-05 DIAGNOSIS — M20.42 HAMMER TOES OF BOTH FEET: ICD-10-CM

## 2024-07-05 DIAGNOSIS — M77.41 METATARSALGIA OF BOTH FEET: ICD-10-CM

## 2024-07-05 PROCEDURE — 99213 OFFICE O/P EST LOW 20 MIN: CPT | Performed by: STUDENT IN AN ORGANIZED HEALTH CARE EDUCATION/TRAINING PROGRAM

## 2024-07-05 NOTE — PROGRESS NOTES
Nick Kemp is a 84 year old male.   Chief Complaint   Patient presents with    Follow - Up     Left foot sore- using mupirocin on the sore- patient denies pain-          HPI:     Patient is a pleasant 84-year-old male who RTC for follow-up of superficial wound/preulcerative calluses to right and left feet.  He was evaluated by Dr. Thomas last visit and has been applying mupirocin to site since then.  He relates that he is doing well and denies pain.  He presents today for evaluation.        Allergies: Patient has no known allergies.   Current Outpatient Medications   Medication Sig Dispense Refill    mupirocin 2 % External Ointment Apply to the ulcer on the bottom of the left foot once daily after cleansing during your shower or bath.  Apply a large absorbent Band-Aid. 30 g 1    hydrALAZINE 50 MG Oral Tab Take 1 tablet (50 mg total) by mouth in the morning and 1 tablet (50 mg total) before bedtime.      rosuvastatin (CRESTOR) 20 MG Oral Tab Take 1 tablet (20 mg total) by mouth daily. 90 tablet 3    furosemide 20 MG Oral Tab Take 1 tablet (20 mg total) by mouth daily as needed (leg swelling).      metoprolol succinate ER 25 MG Oral Tablet 24 Hr Take 1 tablet (25 mg total) by mouth 2 (two) times daily. 60 tablet 1    acetaminophen 325 MG Oral Tab Take 1 tablet (325 mg total) by mouth in the morning and 1 tablet (325 mg total) before bedtime. 1 in the morning and 1 at night.      rivaroxaban (XARELTO) 20 MG Oral Tab TAKE 1 TABLET BY MOUTH EVERY DAY WITH FOOD (Patient taking differently: at bedtime. TAKE 1 TABLET BY MOUTH EVERY DAY WITH FOOD) 90 tablet 3    famotidine 10 MG Oral Tab Take 1 tablet (10 mg total) by mouth at bedtime.        Past Medical History:    Anesthesia complication    Aortic insufficiency    Arrhythmia    Atrial flutter (HCC)    Bad breath    possibly d/t GERD    Cancer (HCC)    basal cell skin cancer    Cataract    Cholelithiasis    Coronary atherosclerosis of unspecified type of vessel,  native or graft    Difficult intubation    Dr. Sol gave letter - will bring with him    DVT (deep venous thrombosis) (HCC)    Earache    secondary to TMJ    Esophageal reflux    Hearing impairment    HEART ATTACK    HIGH BLOOD PRESSURE    HIGH CHOLESTEROL    Hypercholesterolemia    Long term (current) use of anticoagulants    Nephrotic syndrome    Other and unspecified hyperlipidemia    PVC's (premature ventricular contractions)    Renal disorder    Shortness of breath    Syncope    Testicular pain    likely from under treated epididymitis    Unspecified essential hypertension    Visual impairment    glasses      Past Surgical History:   Procedure Laterality Date    Cabg      triple bypass    Cataract      Cath percutaneous  transluminal coronary angioplasty      early      Cholecystectomy      Colonoscopy  2004, 2011    x2    Open heart surgery (pbp)      Skin surgery Right 14    Excision for BCC, nodular to right upper back, [place]    Skin surgery Left 14    MMS for BCC-nod to the L Nasal Supratip    Skin surgery  16    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    Splint, hammer toe      Tonsillectomy        Family History   Problem Relation Age of Onset    Heart Disease Father     Other (Jacqueline Gehrig's disease) Brother       Social History     Socioeconomic History    Marital status:     Number of children: 1   Occupational History    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    Tobacco Use    Smoking status: Former     Current packs/day: 0.00     Types: Cigarettes     Quit date: 1976     Years since quittin.0    Smokeless tobacco: Never   Vaping Use    Vaping status: Never Used   Substance and Sexual Activity    Alcohol use: Yes     Comment: occasionally    Drug use: No   Other Topics Concern    Caffeine Concern Yes     Comment: 4-5 coffees, tea once a day    Exercise No           REVIEW OF SYSTEMS:   Today reviewed systens as documented below  GENERAL HEALTH: feels well  otherwise  SKIN: Refer to exam below  RESPIRATORY: denies shortness of breath with exertion  CARDIOVASCULAR: denies chest pain on exertion  GI: denies abdominal pain and denies heartburn  NEURO: denies headaches    EXAM:   There were no vitals taken for this visit.  GENERAL: well developed, well nourished, in no apparent distress  EXTREMITIES:   1. Integument: Preulcerative callus under right first metatarsal head and left third metatarsal heads.  Sites are mostly resolved but there is still a little bit of superficial sore remaining.  These were lightly debrided with #15 blade.   2. Vascular: Patient has weakly palpable pulses on the left   3. Neurologic: Patient has a little bit of diminished pedal sensation sensorium   4. Musculoskeletal: Patient has atrophy of the fat pad with mild anterior displacement because of hammertoes.    ASSESSMENT AND PLAN:   Diagnoses and all orders for this visit:    Pre-ulcerative calluses    Ulcer of left foot, limited to breakdown of skin (HCC)    Hammer toes of both feet    Metatarsalgia of both feet    Foot callus    Stage 3 chronic kidney disease, unspecified whether stage 3a or 3b CKD (HCC)          Plan:     -Patient examined, chart history reviewed.  -Today trimmed down debrided all hyperkeratotic tissue there was no hemorrhaging.  This was done using a #15 blade.  Patient does have small remaining wounds under his left third metatarsal head and right first metatarsal head.  Can continue to apply mupirocin ointment to the sites daily.  Should ambulate with supportive shoes and accommodative inserts.  Will plan to follow-up again next week to ensure sites are fully resolved.  Can perform routine footcare at this visit and hopefully resume normal regimen moving forward.      -Patient to seek immediate medical attention for any concerns arise meantime or acute signs infection arise.    The patient indicates understanding of these issues and agrees to the plan.    Apolinar Renteria  Block, DPM

## 2024-07-10 ENCOUNTER — OFFICE VISIT (OUTPATIENT)
Dept: PODIATRY CLINIC | Facility: CLINIC | Age: 84
End: 2024-07-10

## 2024-07-10 DIAGNOSIS — N18.30 STAGE 3 CHRONIC KIDNEY DISEASE, UNSPECIFIED WHETHER STAGE 3A OR 3B CKD (HCC): ICD-10-CM

## 2024-07-10 DIAGNOSIS — M79.675 TOE PAIN, BILATERAL: ICD-10-CM

## 2024-07-10 DIAGNOSIS — M20.41 HAMMER TOES OF BOTH FEET: ICD-10-CM

## 2024-07-10 DIAGNOSIS — M77.41 METATARSALGIA OF BOTH FEET: ICD-10-CM

## 2024-07-10 DIAGNOSIS — M20.42 HAMMER TOES OF BOTH FEET: ICD-10-CM

## 2024-07-10 DIAGNOSIS — L84 FOOT CALLUS: ICD-10-CM

## 2024-07-10 DIAGNOSIS — B35.1 ONYCHOMYCOSIS: Primary | ICD-10-CM

## 2024-07-10 DIAGNOSIS — L84 PRE-ULCERATIVE CALLUSES: ICD-10-CM

## 2024-07-10 DIAGNOSIS — M79.674 TOE PAIN, BILATERAL: ICD-10-CM

## 2024-07-10 DIAGNOSIS — M77.42 METATARSALGIA OF BOTH FEET: ICD-10-CM

## 2024-07-10 PROCEDURE — 99213 OFFICE O/P EST LOW 20 MIN: CPT | Performed by: STUDENT IN AN ORGANIZED HEALTH CARE EDUCATION/TRAINING PROGRAM

## 2024-07-15 NOTE — PROGRESS NOTES
[facility]iat[facility]  Progress Note    Nick Kemp is a 84 year old male.   Chief Complaint   Patient presents with    Toenail Care     Nail care and foot check          HPI:     Patient is a pleasant 84-year-old male with past medical history of CKD who presents to clinic for foot exam.  He relates that his prior preulcerative callus/wound sites appear resolved.  He has been applying mupirocin to sites.  He has elongated nails and thickened calluses he has difficulty trimming on his own.  He continues to ambulate with supportive new balance tennis shoes.  No other complaints are mentioned at this time.  Past medical history, medications, and allergies reviewed.        Allergies: Patient has no known allergies.   Current Outpatient Medications   Medication Sig Dispense Refill    mupirocin 2 % External Ointment Apply to the ulcer on the bottom of the left foot once daily after cleansing during your shower or bath.  Apply a large absorbent Band-Aid. 30 g 1    hydrALAZINE 50 MG Oral Tab Take 1 tablet (50 mg total) by mouth in the morning and 1 tablet (50 mg total) before bedtime.      rosuvastatin (CRESTOR) 20 MG Oral Tab Take 1 tablet (20 mg total) by mouth daily. 90 tablet 3    furosemide 20 MG Oral Tab Take 1 tablet (20 mg total) by mouth daily as needed (leg swelling).      metoprolol succinate ER 25 MG Oral Tablet 24 Hr Take 1 tablet (25 mg total) by mouth 2 (two) times daily. 60 tablet 1    acetaminophen 325 MG Oral Tab Take 1 tablet (325 mg total) by mouth in the morning and 1 tablet (325 mg total) before bedtime. 1 in the morning and 1 at night.      rivaroxaban (XARELTO) 20 MG Oral Tab TAKE 1 TABLET BY MOUTH EVERY DAY WITH FOOD (Patient taking differently: at bedtime. TAKE 1 TABLET BY MOUTH EVERY DAY WITH FOOD) 90 tablet 3    famotidine 10 MG Oral Tab Take 1 tablet (10 mg total) by mouth at bedtime.        Past Medical History:    Anesthesia complication    Aortic insufficiency    Arrhythmia     Atrial flutter (HCC)    Bad breath    possibly d/t GERD    Cancer (HCC)    basal cell skin cancer    Cataract    Cholelithiasis    Coronary atherosclerosis of unspecified type of vessel, native or graft    Difficult intubation    Dr. Sol gave letter - will bring with him    DVT (deep venous thrombosis) (HCC)    Earache    secondary to TMJ    Esophageal reflux    Hearing impairment    HEART ATTACK    HIGH BLOOD PRESSURE    HIGH CHOLESTEROL    Hypercholesterolemia    Long term (current) use of anticoagulants    Nephrotic syndrome    Other and unspecified hyperlipidemia    PVC's (premature ventricular contractions)    Renal disorder    Shortness of breath    Syncope    Testicular pain    likely from under treated epididymitis    Unspecified essential hypertension    Visual impairment    glasses      Past Surgical History:   Procedure Laterality Date    Cabg      triple bypass    Cataract      Cath percutaneous  transluminal coronary angioplasty      early      Cholecystectomy      Colonoscopy  2004, 2011    x2    Open heart surgery (pbp)      Skin surgery Right 14    Excision for BCC, nodular to right upper back, [place]    Skin surgery Left 14    MMS for BCC-nod to the L Nasal Supratip    Skin surgery  16    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    Splint, hammer toe      Tonsillectomy        Family History   Problem Relation Age of Onset    Heart Disease Father     Other (Jacqueline Gehrig's disease) Brother       Social History     Socioeconomic History    Marital status:     Number of children: 1   Occupational History    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      EXAM:   There were no vitals taken for this visit.  GENERAL: well developed, well nourished, in no apparent distress  EXTREMITIES:   1. Integument: Normal skin temperature and turgor.  Nails x10 are elongated, thickened, dystrophic, and with subungual debris.  HPK that is pre ulcerative noted subfirst, third, and fifth metatarsal head bilaterally.  Prior wounds appear fully resolved.  No acute signs of infection  2. Vascular: Pedal pulses are palpable, capillary refill normal.   3. Musculoskeletal: All muscle groups are graded 5 out of 5 in the foot and ankle.  Flexion contracture of lesser digits appreciated.  Prominent metatarsal heads noted bilaterally.   4. Neurological: Normal sharp dull sensation; reflexes normal.          ASSESSMENT AND PLAN:   Diagnoses and all orders for this visit:    Onychomycosis    Metatarsalgia of both feet    Hammer toes of both feet    Pre-ulcerative calluses    Toe pain, bilateral    Foot callus    Stage 3 chronic kidney disease, unspecified whether stage 3a or 3b CKD ([facility])        Plan:     -Patient examined, chart history reviewed.  -Discussed importance of proper pedal hygiene, regular foot checks, and tight glucose control.  -Sharply debrided nails x10 with a sterile nail nipper achieving a 20% reduction in thickness and length, without incident. Nails further smoothed with dremel.   -Pared HPKs x2 with #15 blade to healthy tissue without incident.  Patient to apply urea cream to site between visits to prevent buildup and ambulate with supportive shoes and avoid walking barefoot.  Prior wounds appear resolved.  Patient should seek immediate medical attention for any skin breakdown or other concerns arise.  -Educated patient on acute signs of infection advised patient to seek immediate medical attention if symptoms  arise.    The patient indicates understanding of these issues and agrees to the plan.    RTC 2 months.    Apolinar Covarrubias DPM

## 2024-09-11 ENCOUNTER — OFFICE VISIT (OUTPATIENT)
Dept: PODIATRY CLINIC | Facility: CLINIC | Age: 84
End: 2024-09-11

## 2024-09-11 DIAGNOSIS — M77.41 METATARSALGIA OF BOTH FEET: ICD-10-CM

## 2024-09-11 DIAGNOSIS — M79.675 TOE PAIN, BILATERAL: ICD-10-CM

## 2024-09-11 DIAGNOSIS — B35.1 ONYCHOMYCOSIS: Primary | ICD-10-CM

## 2024-09-11 DIAGNOSIS — L84 PRE-ULCERATIVE CALLUSES: ICD-10-CM

## 2024-09-11 DIAGNOSIS — M79.674 TOE PAIN, BILATERAL: ICD-10-CM

## 2024-09-11 DIAGNOSIS — M20.42 HAMMER TOES OF BOTH FEET: ICD-10-CM

## 2024-09-11 DIAGNOSIS — L84 FOOT CALLUS: ICD-10-CM

## 2024-09-11 DIAGNOSIS — N18.30 STAGE 3 CHRONIC KIDNEY DISEASE, UNSPECIFIED WHETHER STAGE 3A OR 3B CKD (HCC): ICD-10-CM

## 2024-09-11 DIAGNOSIS — M77.42 METATARSALGIA OF BOTH FEET: ICD-10-CM

## 2024-09-11 DIAGNOSIS — M20.41 HAMMER TOES OF BOTH FEET: ICD-10-CM

## 2024-09-11 PROCEDURE — 99213 OFFICE O/P EST LOW 20 MIN: CPT | Performed by: STUDENT IN AN ORGANIZED HEALTH CARE EDUCATION/TRAINING PROGRAM

## 2024-09-13 NOTE — PROGRESS NOTES
[facility] Podiatry  Progress Note    Nick Kemp is a 84 year old male.   Chief Complaint   Patient presents with    Toenail Care     Nail care and foot check          HPI:     Patient is a pleasant 84-year-old male with past medical history of CKD who presents to clinic for foot exam.  He relates that his prior preulcerative callus/wound sites appear resolved.  He has elongated nails and thickened calluses he has difficulty trimming on his own.  He continues to ambulate with supportive new balance tennis shoes.  No other complaints are mentioned at this time.  Past medical history, medications, and allergies reviewed.        Allergies: Patient has no known allergies.   Current Outpatient Medications   Medication Sig Dispense Refill    mupirocin 2 % External Ointment Apply to the ulcer on the bottom of the left foot once daily after cleansing during your shower or bath.  Apply a large absorbent Band-Aid. 30 g 1    hydrALAZINE 50 MG Oral Tab Take 1 tablet (50 mg total) by mouth in the morning and 1 tablet (50 mg total) before bedtime.      rosuvastatin (CRESTOR) 20 MG Oral Tab Take 1 tablet (20 mg total) by mouth daily. 90 tablet 3    furosemide 20 MG Oral Tab Take 1 tablet (20 mg total) by mouth daily as needed (leg swelling).      metoprolol succinate ER 25 MG Oral Tablet 24 Hr Take 1 tablet (25 mg total) by mouth 2 (two) times daily. 60 tablet 1    acetaminophen 325 MG Oral Tab Take 1 tablet (325 mg total) by mouth in the morning and 1 tablet (325 mg total) before bedtime. 1 in the morning and 1 at night.      rivaroxaban (XARELTO) 20 MG Oral Tab TAKE 1 TABLET BY MOUTH EVERY DAY WITH FOOD (Patient taking differently: at bedtime. TAKE 1 TABLET BY MOUTH EVERY DAY WITH FOOD) 90 tablet 3    famotidine 10 MG Oral Tab Take 1 tablet (10 mg total) by mouth at bedtime.        Past Medical History:    Anesthesia complication    Aortic insufficiency    Arrhythmia    Atrial flutter (HCC)    Bad breath     possibly d/t GERD    Cancer (HCC)    basal cell skin cancer    Cataract    Cholelithiasis    Coronary atherosclerosis of unspecified type of vessel, native or graft    Difficult intubation    Dr. Sol gave letter - will bring with him    DVT (deep venous thrombosis) (HCC)    Earache    secondary to TMJ    Esophageal reflux    Hearing impairment    HEART ATTACK    HIGH BLOOD PRESSURE    HIGH CHOLESTEROL    Hypercholesterolemia    Long term (current) use of anticoagulants    Nephrotic syndrome    Other and unspecified hyperlipidemia    PVC's (premature ventricular contractions)    Renal disorder    Shortness of breath    Syncope    Testicular pain    likely from under treated epididymitis    Unspecified essential hypertension    Visual impairment    glasses      Past Surgical History:   Procedure Laterality Date    Cabg      triple bypass    Cataract      Cath percutaneous  transluminal coronary angioplasty      early      Cholecystectomy      Colonoscopy  2004, 2011    x2    Open heart surgery (pbp)      Skin surgery Right 14    Excision for BCC, nodular to right upper back, [place]    Skin surgery Left 14    MMS for BCC-nod to the L Nasal Supratip    Skin surgery  16    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    Splint, hammer toe      Tonsillectomy        Family History   Problem Relation Age of Onset    Heart Disease Father     Other (Jacqueline Gehrig's disease) Brother       Social History     Socioeconomic History    Marital status:     Number of children: 1   Occupational History    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      EXAM:   There were no vitals taken for this visit.  GENERAL: well developed, well nourished, in no apparent distress  EXTREMITIES:   1. Integument: Normal skin temperature and turgor.  Nails x10 are elongated, thickened, dystrophic, and with subungual debris.  HPK that is pre ulcerative noted subfirst, third, and fifth metatarsal head bilaterally.  Prior wounds appear fully resolved.  No acute signs of infection  2. Vascular: Pedal pulses are palpable, capillary refill normal.   3. Musculoskeletal: All muscle groups are graded 5 out of 5 in the foot and ankle.  Flexion contracture of lesser digits appreciated.  Prominent metatarsal heads noted bilaterally.   4. Neurological: Normal sharp dull sensation; reflexes normal.          ASSESSMENT AND PLAN:   Diagnoses and all orders for this visit:    Onychomycosis    Metatarsalgia of both feet    Hammer toes of both feet    Pre-ulcerative calluses    Toe pain, bilateral    Foot callus    Stage 3 chronic kidney disease, unspecified whether stage 3a or 3b CKD ([facility])        Plan:     -Patient examined, chart history reviewed.  -Discussed importance of proper pedal hygiene, regular foot checks, and tight glucose control.  -Sharply debrided nails x10 with a sterile nail nipper achieving a 20% reduction in thickness and length, without incident. Nails further smoothed with dremel.   -Pared HPKs x2 with #15 blade to healthy tissue without incident.  Patient to ambulate with supportive shoes and avoid walking barefoot.  Prior wounds appear resolved.  Patient should seek immediate medical attention for any skin breakdown or other concerns arise.  -Educated patient on acute signs of infection advised patient to seek immediate medical attention if symptoms arise.    The patient indicates understanding of these issues and agrees to the plan.    RTC 2  months.    Apolinar Covarrubias, EZRA

## 2024-11-14 ENCOUNTER — OFFICE VISIT (OUTPATIENT)
Dept: INTERNAL MEDICINE CLINIC | Facility: CLINIC | Age: 84
End: 2024-11-14
Payer: MEDICARE

## 2024-11-14 VITALS
DIASTOLIC BLOOD PRESSURE: 80 MMHG | BODY MASS INDEX: 26.31 KG/M2 | HEIGHT: 74 IN | HEART RATE: 70 BPM | WEIGHT: 205 LBS | SYSTOLIC BLOOD PRESSURE: 132 MMHG

## 2024-11-14 DIAGNOSIS — Z00.00 ANNUAL PHYSICAL EXAM: Primary | ICD-10-CM

## 2024-11-14 DIAGNOSIS — R91.8 LUNG NODULES: ICD-10-CM

## 2024-11-14 DIAGNOSIS — I35.8 AORTIC VALVE SCLEROSIS: ICD-10-CM

## 2024-11-14 DIAGNOSIS — I48.92 ATRIAL FLUTTER, PAROXYSMAL (HCC): ICD-10-CM

## 2024-11-14 DIAGNOSIS — R39.11 BENIGN PROSTATIC HYPERPLASIA WITH URINARY HESITANCY: ICD-10-CM

## 2024-11-14 DIAGNOSIS — Z00.00 ENCOUNTER FOR ANNUAL HEALTH EXAMINATION: ICD-10-CM

## 2024-11-14 DIAGNOSIS — N40.1 BENIGN PROSTATIC HYPERPLASIA WITH URINARY HESITANCY: ICD-10-CM

## 2024-11-14 DIAGNOSIS — N05.0 MINIMAL CHANGE GLOMERULAR DISEASE: ICD-10-CM

## 2024-11-14 DIAGNOSIS — I10 BENIGN ESSENTIAL HTN: ICD-10-CM

## 2024-11-14 DIAGNOSIS — Z23 NEED FOR INFLUENZA VACCINATION: ICD-10-CM

## 2024-11-14 DIAGNOSIS — I71.43 INFRARENAL ABDOMINAL AORTIC ANEURYSM (AAA) WITHOUT RUPTURE (HCC): ICD-10-CM

## 2024-11-14 DIAGNOSIS — E78.2 MIXED HYPERLIPIDEMIA: ICD-10-CM

## 2024-11-14 DIAGNOSIS — I25.10 CAD IN NATIVE ARTERY: ICD-10-CM

## 2024-11-14 DIAGNOSIS — I50.32 CHRONIC DIASTOLIC HEART FAILURE (HCC): ICD-10-CM

## 2024-11-14 PROCEDURE — 90662 IIV NO PRSV INCREASED AG IM: CPT | Performed by: INTERNAL MEDICINE

## 2024-11-14 PROCEDURE — G0008 ADMIN INFLUENZA VIRUS VAC: HCPCS | Performed by: INTERNAL MEDICINE

## 2024-11-14 PROCEDURE — G0439 PPPS, SUBSEQ VISIT: HCPCS | Performed by: INTERNAL MEDICINE

## 2024-11-14 NOTE — PROGRESS NOTES
Subjective:   Nick Kemp is a 84 year old male who presents for a Subsequent Annual Wellness visit (Pt already had Initial Annual Wellness) and scheduled follow up of multiple significant but stable problems.   HPi  Normal state of health  Denies cp or sob    He has stopped statin due to wife's concern of memory loss    PAST MEDICAL, SOCIAL, FAMILY HISTORIES REVIEWED WITH PT      History/Other:   Fall Risk Assessment:   He has been screened for Falls and is low risk.      Cognitive Assessment:   He had a completely normal cognitive assessment - see flowsheet entries       Functional Ability/Status:   Nick Kemp has some abnormal functions as listed below:  He has Hearing problems based on screening of functional status.      Depression Screening (PHQ):  PHQ-2 SCORE: 0  , done 11/14/2024            Advanced Directives:   He does have a Living Will but we do NOT have it on file in Epic.    He does have a POA but we do NOT have it on file in Epic.    Discussed Advance Care Planning with patient (and family/surrogate if present). Standard forms made available to patient in After Visit Summary.      Patient Active Problem List   Diagnosis    Benign essential HTN    Atrial flutter, paroxysmal (HCC)    Long term (current) use of anticoagulants    CAD in native artery    History of renal cell carcinoma    Mixed hyperlipidemia    Aortic valve sclerosis    Benign prostatic hyperplasia with urinary hesitancy    Lung nodules    Abdominal aortic aneurysm (AAA) without rupture (HCC)    Minimal change glomerular disease    Chronic diastolic heart failure (HCC)    Stage 3 chronic kidney disease, unspecified whether stage 3a or 3b CKD (HCC)     Allergies:  He has No Known Allergies.    Current Medications:  Outpatient Medications Marked as Taking for the 11/14/24 encounter (Office Visit) with Toby Pantoja MD   Medication Sig    mupirocin 2 % External Ointment Apply to the ulcer on the bottom of the left foot once  daily after cleansing during your shower or bath.  Apply a large absorbent Band-Aid.    hydrALAZINE 50 MG Oral Tab Take 1 tablet (50 mg total) by mouth in the morning and 1 tablet (50 mg total) before bedtime.    furosemide 20 MG Oral Tab Take 1 tablet (20 mg total) by mouth daily as needed (leg swelling).    metoprolol succinate ER 25 MG Oral Tablet 24 Hr Take 1 tablet (25 mg total) by mouth 2 (two) times daily.    acetaminophen 325 MG Oral Tab Take 1 tablet (325 mg total) by mouth in the morning and 1 tablet (325 mg total) before bedtime. 1 in the morning and 1 at night.    rivaroxaban (XARELTO) 20 MG Oral Tab TAKE 1 TABLET BY MOUTH EVERY DAY WITH FOOD (Patient taking differently: at bedtime. TAKE 1 TABLET BY MOUTH EVERY DAY WITH FOOD)    famotidine 10 MG Oral Tab Take 1 tablet (10 mg total) by mouth at bedtime.       Medical History:  He  has a past medical history of Anesthesia complication, Aortic insufficiency (4/7/2014), Arrhythmia, Atrial flutter (HCC), Bad breath, Cancer ([facility]), Cataract, Cholelithiasis, Coronary atherosclerosis of unspecified type of vessel, native or graft, Difficult intubation (7/14/93), DVT (deep venous thrombosis) ([facility]) (4/7/2014), Earache, Esophageal reflux, Hearing impairment, HEART ATTACK, HIGH BLOOD PRESSURE, HIGH CHOLESTEROL, Hypercholesterolemia (4/7/2014), Long term (current) use of anticoagulants (4/7/2014), Nephrotic syndrome, Other and unspecified hyperlipidemia, PVC's (premature ventricular contractions) (4/7/2014), Renal disorder, Shortness of breath, Syncope (4/7/2014), Testicular pain, Unspecified essential hypertension, and Visual impairment.  Surgical History:  He  has a past surgical history that includes colonoscopy (9/8/2004, 2011); cholecystectomy; cath percutaneous  transluminal coronary angioplasty; tonsillectomy; splint, hammer toe; cabg (1993); open heart surgery (pbp); skin surgery (Right, 8-11-14); skin surgery (Left, 8/21/14); skin surgery (9/16/16); and  cataract.   Family History:  His family history includes Heart Disease in his father; Jacqueline Gehrig's disease in his brother.  Social History:  He  reports that he quit smoking about 48 years ago. His smoking use included cigarettes. He has never used smokeless tobacco. He reports current alcohol use. He reports that he does not use drugs.    Tobacco:  He smoked tobacco in the past but quit greater than 12 months ago.  Social History     Tobacco Use   Smoking Status Former    Current packs/day: 0.00    Types: Cigarettes    Quit date: 1976    Years since quittin.3   Smokeless Tobacco Never        CAGE Alcohol Screen:   CAGE screening score of 0 on 2024, showing low risk of alcohol abuse.      Patient Care Team:  Toby Pantoja MD as PCP - General (Internal Medicine)  Maciel Erickson MD (UROLOGY)  Jericho German MD (DERMATOLOGY)  Herrera Hampton MD (NEPHROLOGY)  Abi Sánchez DPM (PODIATRIST)  Pa Harmon, PT as Physical Therapist  Noreen Leung PT as Physical Therapist  Francie Wolfe MD as Consulting Physician (NEUROLOGY)  Jacki Robles PT as Physical Therapist (Physical Therapy)  Izabel Wall, PT as Physical Therapist  Elham Cuba (OPTOMETRY)  Tolu June MD (Cardiovascular Diseases)    Review of Systems  A comprehensive  10 point review of systems was completed.     Pertinent positives and negatives noted in the HPI.      Objective:   Physical Exam  General: No acute distress. Alert and oriented x 3.  HEENT: Normocephalic atraumatic.   Neck: No lymphadenopathy.   Respiratory: Clear to auscultation bilaterally.   Cardiovascular: S1, S2. Regular rate and rhythm.   Abdomen: Soft,  no pain.  nontender, nondistended.  Positive bowel sounds. .  Neurologic: No focal neurological deficits.   Musculoskeletal: Moves all extremities.  Extremities: No edema or cyanosis.  Integument: No rashes or lesions.    Psychiatric: Appropriate mood and affect.    /80   Pulse 70   Ht 6' 2\" (1.88 m)    Wt 205 lb (93 kg)   BMI 26.32 kg/m²  Estimated body mass index is 26.32 kg/m² as calculated from the following:    Height as of this encounter: 6' 2\" (1.88 m).    Weight as of this encounter: 205 lb (93 kg).    Medicare Hearing Assessment:   Hearing Screening    Screening Method: Whisper Test  Whisper Test Result: Pass               Assessment & Plan:   Nick Kemp is a 84 year old male who presents for a Medicare Assessment.     1. Annual physical exam (Primary)  2. Atrial flutter, paroxysmal (HCC)  3. Benign essential HTN  -     CBC With Differential With Platelet; Future; Expected date: 11/14/2024  -     Comp Metabolic Panel (14); Future; Expected date: 11/14/2024  -     Lipid Panel; Future; Expected date: 11/14/2024  -     TSH W Reflex To Free T4; Future; Expected date: 11/14/2024  -     Urinalysis, Routine; Future; Expected date: 11/14/2024  4. Mixed hyperlipidemia  -     Comp Metabolic Panel (14); Future; Expected date: 11/14/2024  -     Lipid Panel; Future; Expected date: 11/14/2024  5. Infrarenal abdominal aortic aneurysm (AAA) without rupture (HCC)  6. Aortic valve sclerosis  7. Benign prostatic hyperplasia with urinary hesitancy  8. CAD in native artery  9. Chronic diastolic heart failure (HCC)  10. Lung nodules  11. Minimal change glomerular disease  12. Encounter for annual health examination  13. Need for influenza vaccination  -     High Dose Fluzone trivalent influenza, 65 yrs+ PFS [93677]    Essential hypertension-controlled. Cont current med therapy     Atrial flutter, paroxysmal (HCC)-rates controlled. Cont current med plan. Sees cardiology     Coronary artery disease involving native heart without angina pectoris-stable. continue control of cad risk factors. We reviewed need for statin to lower cad risk , he and wife declines statin use. Defer to cardiology  Defer to cardiology regarding statin     History of renal cell carcinoma-MAXIMO. Sees urology     Mixed hyperlipidemia-controlled. Pt  has stopped statin. Defer to cardiology     Aortic valve sclerosis-stable cont statin     BPH with obstruction/lower urinary tract symptoms-+nocturia. Sees urology to discuss treatment options     Lung nodules-benign on serial CT      Abdominal aortic aneurysm (AAA) without rupture (HCC)-monitored by cardiology. Cont HTN control     Minimal change glomerular disease/Stage 3 chronic kidney disease, unspecified whether stage 3a or 3b CKD (HCC) -stable. defer to renal service     Chronic diastolic heart failure (HCC)-compensated. Sees cardiology    The patient indicates understanding of these issues and agrees to the plan.  Continue with current treatment plan.  Lab work ordered.  Reinforced healthy diet, lifestyle, and exercise.      Return in about 6 months (around 5/14/2025).     Toby Pantoja MD, 11/14/2024     Supplementary Documentation:   General Health:  In the past six months, have you lost more than 10 pounds without trying?: 2 - No  Has your appetite been poor?: No  Type of Diet: Balanced  How does the patient maintain a good energy level?: Daily Walks  How would you describe your daily physical activity?: Moderate  How would you describe your current health state?: Good  How do you maintain positive mental well-being?: Visiting Friends;Visiting Family  Have you had any immunizations at another office such as Influenza, Hepatitis B, Tetanus, or Pneumococcal?: No    Health Maintenance   Topic Date Due    Zoster Vaccines (1 of 2) Never done    COVID-19 Vaccine (7 - 2024-25 season) 09/01/2024    Influenza Vaccine (1) 10/01/2024    Annual Physical  11/13/2024    Annual Depression Screening  Completed    Fall Risk Screening (Annual)  Completed    Pneumococcal Vaccine: 65+ Years  Completed

## 2024-11-19 ENCOUNTER — LAB ENCOUNTER (OUTPATIENT)
Dept: LAB | Age: 84
End: 2024-11-19
Attending: INTERNAL MEDICINE
Payer: MEDICARE

## 2024-11-19 DIAGNOSIS — E78.2 MIXED HYPERLIPIDEMIA: ICD-10-CM

## 2024-11-19 DIAGNOSIS — N30.90 CYSTITIS: Primary | ICD-10-CM

## 2024-11-19 DIAGNOSIS — I10 BENIGN ESSENTIAL HTN: ICD-10-CM

## 2024-11-19 DIAGNOSIS — R31.9 HEMATURIA, UNSPECIFIED TYPE: ICD-10-CM

## 2024-11-19 LAB
ALBUMIN SERPL-MCNC: 3.8 G/DL (ref 3.2–4.8)
ALBUMIN/GLOB SERPL: 1.2 {RATIO} (ref 1–2)
ALP LIVER SERPL-CCNC: 71 U/L
ALT SERPL-CCNC: 15 U/L
ANION GAP SERPL CALC-SCNC: 5 MMOL/L (ref 0–18)
AST SERPL-CCNC: 18 U/L (ref ?–34)
BASOPHILS # BLD AUTO: 0.06 X10(3) UL (ref 0–0.2)
BASOPHILS NFR BLD AUTO: 0.5 %
BILIRUB SERPL-MCNC: 0.9 MG/DL (ref 0.2–1.1)
BILIRUB UR QL STRIP.AUTO: NEGATIVE
BUN BLD-MCNC: 24 MG/DL (ref 9–23)
CALCIUM BLD-MCNC: 9.3 MG/DL (ref 8.7–10.4)
CHLORIDE SERPL-SCNC: 108 MMOL/L (ref 98–112)
CHOLEST SERPL-MCNC: 107 MG/DL (ref ?–200)
CO2 SERPL-SCNC: 24 MMOL/L (ref 21–32)
CREAT BLD-MCNC: 1.13 MG/DL
EGFRCR SERPLBLD CKD-EPI 2021: 63 ML/MIN/1.73M2 (ref 60–?)
EOSINOPHIL # BLD AUTO: 0.03 X10(3) UL (ref 0–0.7)
EOSINOPHIL NFR BLD AUTO: 0.2 %
ERYTHROCYTE [DISTWIDTH] IN BLOOD BY AUTOMATED COUNT: 13.1 %
FASTING PATIENT LIPID ANSWER: YES
FASTING STATUS PATIENT QL REPORTED: YES
GLOBULIN PLAS-MCNC: 3.1 G/DL (ref 2–3.5)
GLUCOSE BLD-MCNC: 99 MG/DL (ref 70–99)
GLUCOSE UR STRIP.AUTO-MCNC: NORMAL MG/DL
HCT VFR BLD AUTO: 43.6 %
HDLC SERPL-MCNC: 38 MG/DL (ref 40–59)
HGB BLD-MCNC: 14.3 G/DL
IMM GRANULOCYTES # BLD AUTO: 0.05 X10(3) UL (ref 0–1)
IMM GRANULOCYTES NFR BLD: 0.4 %
KETONES UR STRIP.AUTO-MCNC: NEGATIVE MG/DL
LDLC SERPL CALC-MCNC: 50 MG/DL (ref ?–100)
LEUKOCYTE ESTERASE UR QL STRIP.AUTO: 500
LYMPHOCYTES # BLD AUTO: 1.83 X10(3) UL (ref 1–4)
LYMPHOCYTES NFR BLD AUTO: 13.9 %
MCH RBC QN AUTO: 29.4 PG (ref 26–34)
MCHC RBC AUTO-ENTMCNC: 32.8 G/DL (ref 31–37)
MCV RBC AUTO: 89.7 FL
MONOCYTES # BLD AUTO: 1.3 X10(3) UL (ref 0.1–1)
MONOCYTES NFR BLD AUTO: 9.9 %
NEUTROPHILS # BLD AUTO: 9.87 X10 (3) UL (ref 1.5–7.7)
NEUTROPHILS # BLD AUTO: 9.87 X10(3) UL (ref 1.5–7.7)
NEUTROPHILS NFR BLD AUTO: 75.1 %
NONHDLC SERPL-MCNC: 69 MG/DL (ref ?–130)
OSMOLALITY SERPL CALC.SUM OF ELEC: 288 MOSM/KG (ref 275–295)
PH UR STRIP.AUTO: 5 [PH] (ref 5–8)
PLATELET # BLD AUTO: 204 10(3)UL (ref 150–450)
POTASSIUM SERPL-SCNC: 4.2 MMOL/L (ref 3.5–5.1)
PROT SERPL-MCNC: 6.9 G/DL (ref 5.7–8.2)
PROT UR STRIP.AUTO-MCNC: 30 MG/DL
RBC # BLD AUTO: 4.86 X10(6)UL
RBC #/AREA URNS AUTO: >10 /HPF
SODIUM SERPL-SCNC: 137 MMOL/L (ref 136–145)
SP GR UR STRIP.AUTO: 1.02 (ref 1–1.03)
TRIGL SERPL-MCNC: 99 MG/DL (ref 30–149)
TSI SER-ACNC: 1.15 UIU/ML (ref 0.55–4.78)
UROBILINOGEN UR STRIP.AUTO-MCNC: NORMAL MG/DL
VLDLC SERPL CALC-MCNC: 14 MG/DL (ref 0–30)
WBC # BLD AUTO: 13.1 X10(3) UL (ref 4–11)
WBC #/AREA URNS AUTO: >50 /HPF
WBC CLUMPS UR QL AUTO: PRESENT /HPF

## 2024-11-19 PROCEDURE — 81001 URINALYSIS AUTO W/SCOPE: CPT

## 2024-11-19 PROCEDURE — 80061 LIPID PANEL: CPT

## 2024-11-19 PROCEDURE — 36415 COLL VENOUS BLD VENIPUNCTURE: CPT

## 2024-11-19 PROCEDURE — 85025 COMPLETE CBC W/AUTO DIFF WBC: CPT

## 2024-11-19 PROCEDURE — 84443 ASSAY THYROID STIM HORMONE: CPT

## 2024-11-19 PROCEDURE — 80053 COMPREHEN METABOLIC PANEL: CPT

## 2024-11-19 RX ORDER — LEVOFLOXACIN 500 MG/1
500 TABLET, FILM COATED ORAL DAILY
Qty: 10 TABLET | Refills: 0 | Status: SHIPPED | OUTPATIENT
Start: 2024-11-19

## 2024-11-20 ENCOUNTER — OFFICE VISIT (OUTPATIENT)
Dept: PODIATRY CLINIC | Facility: CLINIC | Age: 84
End: 2024-11-20
Payer: MEDICARE

## 2024-11-20 DIAGNOSIS — M77.41 METATARSALGIA OF BOTH FEET: ICD-10-CM

## 2024-11-20 DIAGNOSIS — M77.42 METATARSALGIA OF BOTH FEET: ICD-10-CM

## 2024-11-20 DIAGNOSIS — L84 PRE-ULCERATIVE CALLUSES: ICD-10-CM

## 2024-11-20 DIAGNOSIS — N18.30 STAGE 3 CHRONIC KIDNEY DISEASE, UNSPECIFIED WHETHER STAGE 3A OR 3B CKD (HCC): ICD-10-CM

## 2024-11-20 DIAGNOSIS — L84 FOOT CALLUS: ICD-10-CM

## 2024-11-20 DIAGNOSIS — M20.42 HAMMER TOES OF BOTH FEET: ICD-10-CM

## 2024-11-20 DIAGNOSIS — M20.41 HAMMER TOES OF BOTH FEET: ICD-10-CM

## 2024-11-20 DIAGNOSIS — N30.90 CYSTITIS: Primary | ICD-10-CM

## 2024-11-20 DIAGNOSIS — B35.1 ONYCHOMYCOSIS: Primary | ICD-10-CM

## 2024-11-20 PROCEDURE — 99213 OFFICE O/P EST LOW 20 MIN: CPT | Performed by: STUDENT IN AN ORGANIZED HEALTH CARE EDUCATION/TRAINING PROGRAM

## 2024-11-22 NOTE — PROGRESS NOTES
[facility]  Progress Note    Nick Kemp is a 84 year old male.   Chief Complaint   Patient presents with    Toenail Care     Nail care and foot check         HPI:     Patient is a pleasant 84-year-old male with past medical history of CKD who presents to clinic for foot exam.  He relates that his prior preulcerative callus/wound sites appear resolved.  He has elongated nails and thickened calluses he has difficulty trimming on his own.  He continues to ambulate with supportive new balance tennis shoes.  No other complaints are mentioned at this time.  Past medical history, medications, and allergies reviewed.        Allergies: Patient has no known allergies.   Current Outpatient Medications   Medication Sig Dispense Refill    levoFLOXacin 500 MG Oral Tab Take 1 tablet (500 mg total) by mouth daily. 10 tablet 0    mupirocin 2 % External Ointment Apply to the ulcer on the bottom of the left foot once daily after cleansing during your shower or bath.  Apply a large absorbent Band-Aid. 30 g 1    hydrALAZINE 50 MG Oral Tab Take 1 tablet (50 mg total) by mouth in the morning and 1 tablet (50 mg total) before bedtime.      furosemide 20 MG Oral Tab Take 1 tablet (20 mg total) by mouth daily as needed (leg swelling).      metoprolol succinate ER 25 MG Oral Tablet 24 Hr Take 1 tablet (25 mg total) by mouth 2 (two) times daily. 60 tablet 1    acetaminophen 325 MG Oral Tab Take 1 tablet (325 mg total) by mouth in the morning and 1 tablet (325 mg total) before bedtime. 1 in the morning and 1 at night.      rivaroxaban (XARELTO) 20 MG Oral Tab TAKE 1 TABLET BY MOUTH EVERY DAY WITH FOOD (Patient taking differently: at bedtime. TAKE 1 TABLET BY MOUTH EVERY DAY WITH FOOD) 90 tablet 3    famotidine 10 MG Oral Tab Take 1 tablet (10 mg total) by mouth at bedtime.        Past Medical History:    Anesthesia complication    Aortic insufficiency    Arrhythmia    Atrial flutter (HCC)    Bad breath    possibly d/t  GERD    Cancer (HCC)    basal cell skin cancer    Cataract    Cholelithiasis    Coronary atherosclerosis of unspecified type of vessel, native or graft    Difficult intubation    Dr. Sol gave letter - will bring with him    DVT (deep venous thrombosis) (HCC)    Earache    secondary to TMJ    Esophageal reflux    Hearing impairment    HEART ATTACK    HIGH BLOOD PRESSURE    HIGH CHOLESTEROL    Hypercholesterolemia    Long term (current) use of anticoagulants    Nephrotic syndrome    Other and unspecified hyperlipidemia    PVC's (premature ventricular contractions)    Renal disorder    Shortness of breath    Syncope    Testicular pain    likely from under treated epididymitis    Unspecified essential hypertension    Visual impairment    glasses      Past Surgical History:   Procedure Laterality Date    Cabg      triple bypass    Cataract      Cath percutaneous  transluminal coronary angioplasty      early      Cholecystectomy      Colonoscopy  2004, 2011    x2    Open heart surgery (pbp)      Skin surgery Right 14    Excision for BCC, nodular to right upper back, [place]    Skin surgery Left 14    MMS for BCC-nod to the L Nasal Supratip    Skin surgery  16    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    Splint, hammer toe      Tonsillectomy        Family History   Problem Relation Age of Onset    Heart Disease Father     Other (Jacqueline Gehrig's disease) Brother       Social History     Socioeconomic History    Marital status:     Number of children: 1   Occupational History    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      EXAM:   There were no vitals taken for this visit.  GENERAL: well developed, well nourished, in no apparent distress  EXTREMITIES:   1. Integument: Normal skin temperature and turgor.  Nails x10 are elongated, thickened, dystrophic, and with subungual debris.  HPK that is pre ulcerative noted subfirst, third, and fifth metatarsal head bilaterally.  Prior wounds appear fully resolved.  No acute signs of infection.  2. Vascular: Pedal pulses are palpable, capillary refill normal.   3. Musculoskeletal: All muscle groups are graded 5 out of 5 in the foot and ankle.  Flexion contracture of lesser digits appreciated.  Prominent metatarsal heads noted bilaterally.   4. Neurological: Normal sharp dull sensation; reflexes normal.          ASSESSMENT AND PLAN:   Diagnoses and all orders for this visit:    Onychomycosis    Metatarsalgia of both feet    Hammer toes of both feet    Pre-ulcerative calluses    Foot callus    Stage 3 chronic kidney disease, unspecified whether stage 3a or 3b CKD ([facility])        Plan:     -Patient examined, chart history reviewed.  -Discussed importance of proper pedal hygiene, regular foot checks, and tight glucose control.  -Sharply debrided nails x10 with a sterile nail nipper achieving a 20% reduction in thickness and length, without incident. Nails further smoothed with dremel.   -Pared HPKs x2 with #15 blade to healthy tissue without incident.  Patient to ambulate with supportive shoes and avoid walking barefoot.  Prior wounds appear resolved.  Patient should seek immediate medical attention for any skin breakdown or other concerns arise.  -Educated patient on acute signs of infection advised patient to seek immediate medical attention if symptoms arise.    The patient indicates understanding of these issues and agrees to the plan.    RTC 2 months.    Apolinar Covarrubias DPM

## 2024-12-12 ENCOUNTER — TELEPHONE (OUTPATIENT)
Dept: HEMATOLOGY/ONCOLOGY | Facility: HOSPITAL | Age: 84
End: 2024-12-12

## 2024-12-12 NOTE — TELEPHONE ENCOUNTER
New consult for Cystitis and Hematuria, unspecified type. Patient is being referred Dr. Pantoja to see Dr. Tafoya. Please patient's wife Lori at [phone]. Called 12/12/24 [place]

## 2025-01-13 ENCOUNTER — OFFICE VISIT (OUTPATIENT)
Age: 85
End: 2025-01-13
Attending: INTERNAL MEDICINE
Payer: MEDICARE

## 2025-01-13 VITALS
HEIGHT: 74.02 IN | SYSTOLIC BLOOD PRESSURE: 197 MMHG | TEMPERATURE: 98 F | DIASTOLIC BLOOD PRESSURE: 83 MMHG | RESPIRATION RATE: 18 BRPM | BODY MASS INDEX: 26.44 KG/M2 | HEART RATE: 83 BPM | WEIGHT: 206 LBS | OXYGEN SATURATION: 98 %

## 2025-01-13 DIAGNOSIS — D72.829 LEUKOCYTOSIS, UNSPECIFIED TYPE: Primary | ICD-10-CM

## 2025-01-13 NOTE — PROGRESS NOTES
[facility] Report of Consultation    Patient Name: Nick Kemp   YOB: 1940   Medical Record Number: [id number]   CSN: [id number]   Consulting Physician: Nick Tafoya M.D.   Referring Physician: No ref. provider found    Date of Consultation: 1/13/2025      Reason for Consultation (Chief Complaint):  No chief complaint on file.       Hematology History  Nick Kemp is a 84 year old male referred by Dr. Pantoja for evaluation of leukocytosis.  The patient reports that on and off for several years, his WBC has been elevated. His last visit with his primary care provider, again, noted a WBC slightly above normal.  The study was repeated the next day and the finding was confirmed, prompting this consultation. He denies F/C/NS.  No recent illness.  No known COVID 19 exposures.  He has no palpable LAD.  His energy level is normal.      History of Present Illness:  Pt returns for re-consultation. His last visit was in May, 2020.   He reports that his WBC has been persistently elevated over the past few months prompting re-evaluation.   He feels well. No recent illnesses.  No F/C/NS. No wt loss.     Past Medical History:  Past Medical History:    Anesthesia complication    Aortic insufficiency    Arrhythmia    Atrial flutter (HCC)    Bad breath    possibly d/t GERD    Cancer (HCC)    basal cell skin cancer    Cataract    Cholelithiasis    Coronary atherosclerosis of unspecified type of vessel, native or graft    Difficult intubation    Dr. Sol gave letter - will bring with him    DVT (deep venous thrombosis) (HCC)    Earache    secondary to TMJ    Esophageal reflux    Hearing impairment    HEART ATTACK    HIGH BLOOD PRESSURE    HIGH CHOLESTEROL    Hypercholesterolemia    Long term (current) use of anticoagulants    Nephrotic syndrome    Other and unspecified hyperlipidemia    PVC's (premature ventricular contractions)    Renal disorder    Shortness of breath    Syncope    Testicular  pain    likely from under treated epididymitis    Unspecified essential hypertension    Visual impairment    glasses       Past Surgical History:  Past Surgical History:   Procedure Laterality Date    Cabg      triple bypass    Cataract      Cath percutaneous  transluminal coronary angioplasty      early      Cholecystectomy      Colonoscopy  2004, 2011    x2    Open heart surgery (pbp)      Skin surgery Right 14    Excision for BCC, nodular to right upper back, [place]    Skin surgery Left 14    MMS for BCC-nod to the L Nasal Supratip    Skin surgery  16    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    Splint, hammer toe      Tonsillectomy         Gynecologic History:      Family History:  Family History   Problem Relation Age of Onset    Heart Disease Father     Other (Jacqueline Gehrig's disease) Brother        Social History:  Social History     Socioeconomic History    Marital status:      Spouse name: Not on file    Number of children: 1    Years of education: Not on file    Highest education level: Not on file   Occupational History    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    Tobacco Use    Smoking status: Former     Current packs/day: 0.00     Types: Cigarettes     Quit date: 1976     Years since quittin.5    Smokeless tobacco: Never   Vaping Use    Vaping status: Never Used   Substance and Sexual Activity    Alcohol use: Yes     Comment: occasionally    Drug use: No    Sexual activity: Not on file   Other Topics Concern     Service Not Asked    Blood Transfusions Not Asked    Caffeine Concern Yes     Comment: 4-5 coffees, tea once a day    Occupational Exposure Not Asked    Hobby Hazards Not Asked    Sleep Concern Not Asked    Stress Concern Not Asked    Weight Concern Not Asked    Special Diet Not Asked    Back Care Not Asked    Exercise No    Bike Helmet Not Asked    Seat Belt Not Asked    Self-Exams Not Asked   Social History Narrative    Not on file     Social  Drivers of Health     Financial Resource Strain: Low Risk  (5/6/2024)    Financial Resource Strain     Difficulty of Paying Living Expenses: Not hard at all     Med Affordability: No   Food Insecurity: No Food Insecurity (5/3/2024)    Food Insecurity     Food Insecurity: Never true   Transportation Needs: No Transportation Needs (5/3/2024)    Transportation Needs     Lack of Transportation: No   Physical Activity: Not on file   Stress: Not on file   Social Connections: Not on file   Housing Stability: Low Risk  (5/3/2024)    Housing Stability     Housing Instability: No     Housing Instability Emergency: Not on file     Crib or Bassinette: Not on file       Current Medications:    Current Outpatient Medications:     levoFLOXacin 500 MG Oral Tab, Take 1 tablet (500 mg total) by mouth daily., Disp: 10 tablet, Rfl: 0    mupirocin 2 % External Ointment, Apply to the ulcer on the bottom of the left foot once daily after cleansing during your shower or bath.  Apply a large absorbent Band-Aid., Disp: 30 g, Rfl: 1    hydrALAZINE 50 MG Oral Tab, Take 1 tablet (50 mg total) by mouth in the morning and 1 tablet (50 mg total) before bedtime., Disp: , Rfl:     furosemide 20 MG Oral Tab, Take 1 tablet (20 mg total) by mouth daily as needed (leg swelling)., Disp: , Rfl:     metoprolol succinate ER 25 MG Oral Tablet 24 Hr, Take 1 tablet (25 mg total) by mouth 2 (two) times daily., Disp: 60 tablet, Rfl: 1    acetaminophen 325 MG Oral Tab, Take 1 tablet (325 mg total) by mouth in the morning and 1 tablet (325 mg total) before bedtime. 1 in the morning and 1 at night., Disp: , Rfl:     rivaroxaban (XARELTO) 20 MG Oral Tab, TAKE 1 TABLET BY MOUTH EVERY DAY WITH FOOD (Patient taking differently: at bedtime. TAKE 1 TABLET BY MOUTH EVERY DAY WITH FOOD), Disp: 90 tablet, Rfl: 3    famotidine 10 MG Oral Tab, Take 1 tablet (10 mg total) by mouth at bedtime., Disp: , Rfl:     Allergies:  No Known Allergies     Review of  Systems:    Constitutional Normal - No fevers, chills, night sweats, excessive fatigue or weight loss.   Eyes Normal - No significant visual difficulties. No diplopia. No yellowing of the eyes.   Hematologic/Lymphatic Normal - No easy bruising or bleeding.  The patient denies any tender or palpable lymph nodes.   Respiratory Normal - No dyspnea on exertion, chest pain, cough or hemoptysis.   Cardiovascular Normal - No anginal chest pain, palpitations or orthopnea.   Gastrointestinal Normal - No nausea, vomiting, diarrhea, GI bleeding, or constipation. No change in bowel habits, no heartburn or early satiety.   Genitorurinary (M) Normal - No hematuria, dysuria, increased frequency, urgency, hesitancy or incontinence.   Integumentary Normal - No chronic rashes, inflammation, ulcerations or skin changes.   Neurologic Normal - No headache, blurred vision, and no areas of focal weakness. Normal gait.   Psychiatric Normal - No insomnia, depression, litzy or mood swings.         Vital Signs:  BP (!) 197/83 (BP Location: Left arm, Patient Position: Sitting, Cuff Size: large)   Pulse 83   Temp 98.1 °F (36.7 °C) (Temporal)   Resp 18   Ht 1.88 m (6' 2.02\")   Wt 93.4 kg (206 lb)   SpO2 98%   BMI 26.44 kg/m²     Performance Status:  ECOG 0    Physical Examination:    Constitutional Normal - Alert, cooperative, oriented. Mood and affect appropriate. Appears close to chronological age. Well nourished. Well developed.   Eyes Normal - Conjunctivae and sclerae are clear and without icterus. Pupils are reactive and equal.   ENMT Normal - Sinuses are nontender.  No oral exudates, ulcers, masses, thrush or mucositis. Oropharynx clear.  Tongue normal.   Neck Normal - Supple without masses or thyromegaly.   Hematologic/Lymphatic Normal - No petechiae or purpura.  No tender or palpable lymph nodes in the cervical, supraclavicular, axillary or inguinal area.   Respiratory Normal - Lungs are clear to auscultation without rhonchi or  wheezing.   Cardiovascular Normal - Regular rate and rhythm of heart without murmurs, gallops or rubs.   Abdomen Normal - Non-tender, non-distended, no masses, ascites or hepatosplenomegaly. Good bowel sounds. No guarding or rebound tenderness. No pulsatile masses.   Extremities Normal - No visible deformities, no cyanosis, clubbing or edema. Pulses 4+ and equal bilaterally.   Integumentary Normal - No rashes, scars, or lesions suggestive of malignancy.   Neurologic Normal - No sensory or motor deficits, normal cerebellar function, normal gait, cranial nerves intact.   Psychiatric Normal - Alert and oriented times three. Coherent speech. Verbalizes understanding of our discussions today.       Laboratory:   Latest Reference Range & Units 11/19/24 10:46   Glucose 70 - 99 mg/dL 99   Sodium 136 - 145 mmol/L 137   Potassium 3.5 - 5.1 mmol/L 4.2   Chloride 98 - 112 mmol/L 108   Carbon Dioxide, Total 21.0 - 32.0 mmol/L 24.0   BUN 9 - 23 mg/dL 24 (H)   CREATININE 0.70 - 1.30 mg/dL 1.13   CALCIUM 8.7 - 10.4 mg/dL 9.3   EGFR >=60 mL/min/1.73m2 63   ANION GAP 0 - 18 mmol/L 5   CALCULATED OSMOLALITY 275 - 295 mOsm/kg 288   ALKALINE PHOSPHATASE 45 - 117 U/L 71   AST (SGOT) <34 U/L 18   ALT (SGPT) 10 - 49 U/L 15   Total Bilirubin 0.2 - 1.1 mg/dL 0.9   Globulin 2.0 - 3.5 g/dL 3.1   (H): Data is abnormally high   Latest Reference Range & Units 05/04/24 07:09 11/19/24 10:46   WBC 4.0 - 11.0 x10(3) uL 16.2 (H) 13.1 (H)   Hemoglobin 13.0 - 17.5 g/dL 14.0 14.3   Hematocrit 39.0 - 53.0 % 42.1 43.6   Platelet Count 150.0 - 450.0 10(3)uL 160.0 204.0   RBC 3.80 - 5.80 x10(6)uL 4.87 4.86   MCH 26.0 - 34.0 pg 28.7 29.4   MCHC 31.0 - 37.0 g/dL 33.3 32.8   MCV 80.0 - 100.0 fL 86.4 89.7   RDW % 14.0 13.1   Immature Platelet Fraction 0.0 - 7.0 % 4.1    Prelim Neutrophil Abs 1.50 - 7.70 x10 (3) uL 12.32 (H) 9.87 (H)   Neutrophils Absolute 1.50 - 7.70 x10(3) uL 12.32 (H) 9.87 (H)   Lymphocytes Absolute 1.00 - 4.00 x10(3) uL 2.15 1.83   Monocytes  Absolute 0.10 - 1.00 x10(3) uL 1.48 (H) 1.30 (H)   Eosinophils Absolute 0.00 - 0.70 x10(3) uL 0.12 0.03   Basophils Absolute 0.00 - 0.20 x10(3) uL 0.05 0.06   Immature Granulocyte Absolute 0.00 - 1.00 x10(3) uL 0.09 0.05   Neutrophils % % 76.0 75.1   Lymphocytes % % 13.3 13.9   Monocytes % % 9.1 9.9   Eosinophils % % 0.7 0.2   Basophils % % 0.3 0.5   Immature Granulocyte % % 0.6 0.4   (H): Data is abnormally high  Radiology:    Pathology:    Impression and Plan:  Leukocytosis: A review of the patient's CBCs dating back to 2012 reveal a baseline total WBC ranging from 8 to 10 when he is not ill, with appropriate spikes when he is ill.  The most recent counts are slightly above his baseline and slightly above normal. Unlike his prior visit, he has developed a persistent monocytosis. We discussed that the testing for CML has improved since his last visit, and I recommend repeating that testing. 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 We discussed that a bone marrow biopsy would be needed to confirm that diagnosis and the treatment would be observation. He would like to avoid the BmBx, if possible.   Will follow up on the testing and plan to follow annually with blood work and exams.           Planned Follow Up:  annually    I spent 60 minutes face to face with the patient.  More than 50% of that time was spent counseling the patient and/or on coordination of care.  The diagnosis, prognosis, and general treatment was explained to the patient and the family.    Risk Level: High    Electronically Signed by:    Nick Tafoya M.D.  [facility]

## 2025-01-13 NOTE — PROGRESS NOTES
Referred by Dr. Pantoja for low blood counts.  Pt last seen in 2020.   No  new complaints.    Outpatient Oncology Care Plan  Problem list:  knowledge deficit    Problems related to:    disease/disease progression    Interventions:  provided general teaching    Expected outcomes:  understands plan of care    Progress towards outcome:  making progress    Education Record    Learner:  Patient and Family Member  Barriers / Limitations:  None  Method:  Brief focused  Outcome:  Shows understanding  Comments:

## 2025-01-29 ENCOUNTER — OFFICE VISIT (OUTPATIENT)
Dept: PODIATRY CLINIC | Facility: CLINIC | Age: 85
End: 2025-01-29
Payer: MEDICARE

## 2025-01-29 DIAGNOSIS — L84 FOOT CALLUS: ICD-10-CM

## 2025-01-29 DIAGNOSIS — M20.42 HAMMER TOES OF BOTH FEET: ICD-10-CM

## 2025-01-29 DIAGNOSIS — M20.41 HAMMER TOES OF BOTH FEET: ICD-10-CM

## 2025-01-29 DIAGNOSIS — M77.42 METATARSALGIA OF BOTH FEET: ICD-10-CM

## 2025-01-29 DIAGNOSIS — B35.1 ONYCHOMYCOSIS: Primary | ICD-10-CM

## 2025-01-29 DIAGNOSIS — N18.30 STAGE 3 CHRONIC KIDNEY DISEASE, UNSPECIFIED WHETHER STAGE 3A OR 3B CKD (HCC): ICD-10-CM

## 2025-01-29 DIAGNOSIS — L84 PRE-ULCERATIVE CALLUSES: ICD-10-CM

## 2025-01-29 DIAGNOSIS — M77.41 METATARSALGIA OF BOTH FEET: ICD-10-CM

## 2025-01-29 PROCEDURE — 99213 OFFICE O/P EST LOW 20 MIN: CPT | Performed by: STUDENT IN AN ORGANIZED HEALTH CARE EDUCATION/TRAINING PROGRAM

## 2025-01-30 NOTE — PROGRESS NOTES
[facility]  Progress Note    Nick Kemp is a 84 year old male.   Chief Complaint   Patient presents with    Toenail Care         HPI:     Patient is a pleasant 84-year-old male with past medical history of CKD who presents to clinic for foot exam.  He relates that his prior preulcerative callus/wound sites appear resolved.  He has elongated nails and thickened calluses he has difficulty trimming on his own.  He continues to ambulate with supportive new balance tennis shoes.  No other complaints are mentioned at this time.  Past medical history, medications, and allergies reviewed.        Allergies: Patient has no known allergies.   Current Outpatient Medications   Medication Sig Dispense Refill    hydrALAZINE 50 MG Oral Tab Take 1 tablet (50 mg total) by mouth in the morning and 1 tablet (50 mg total) before bedtime.      furosemide 20 MG Oral Tab Take 1 tablet (20 mg total) by mouth daily as needed (leg swelling).      metoprolol succinate ER 25 MG Oral Tablet 24 Hr Take 1 tablet (25 mg total) by mouth 2 (two) times daily. 60 tablet 1    acetaminophen 325 MG Oral Tab Take 1 tablet (325 mg total) by mouth in the morning and 1 tablet (325 mg total) before bedtime. 1 in the morning and 1 at night.      rivaroxaban (XARELTO) 20 MG Oral Tab TAKE 1 TABLET BY MOUTH EVERY DAY WITH FOOD 90 tablet 3    famotidine 10 MG Oral Tab Take 1 tablet (10 mg total) by mouth at bedtime.      levoFLOXacin 500 MG Oral Tab Take 1 tablet (500 mg total) by mouth daily. 10 tablet 0    mupirocin 2 % External Ointment Apply to the ulcer on the bottom of the left foot once daily after cleansing during your shower or bath.  Apply a large absorbent Band-Aid. 30 g 1      Past Medical History:    Anesthesia complication    Aortic insufficiency    Arrhythmia    Atrial flutter (HCC)    Bad breath    possibly d/t GERD    Cancer (HCC)    basal cell skin cancer    Cataract    Cholelithiasis    Coronary atherosclerosis of  unspecified type of vessel, native or graft    Difficult intubation    Dr. Sol gave letter - will bring with him    DVT (deep venous thrombosis) (HCC)    Earache    secondary to TMJ    Esophageal reflux    Hearing impairment    HEART ATTACK    HIGH BLOOD PRESSURE    HIGH CHOLESTEROL    Hypercholesterolemia    Long term (current) use of anticoagulants    Nephrotic syndrome    Other and unspecified hyperlipidemia    PVC's (premature ventricular contractions)    Renal disorder    Shortness of breath    Syncope    Testicular pain    likely from under treated epididymitis    Unspecified essential hypertension    Visual impairment    glasses      Past Surgical History:   Procedure Laterality Date    Cabg      triple bypass    Cataract      Cath percutaneous  transluminal coronary angioplasty      early      Cholecystectomy      Colonoscopy  2004, 2011    x2    Open heart surgery (pbp)      Skin surgery Right 14    Excision for BCC, nodular to right upper back, [place]    Skin surgery Left 14    MMS for BCC-nod to the L Nasal Supratip    Skin surgery  16    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    Splint, hammer toe      Tonsillectomy        Family History   Problem Relation Age of Onset    Heart Disease Father     Other (Jacqueline Gehrig's disease) Brother       Social History     Socioeconomic History    Marital status:     Number of children: 1   Occupational History    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      EXAM:   There were no vitals taken for this visit.  GENERAL: well developed, well nourished, in no apparent distress  EXTREMITIES:   1. Integument: Normal skin temperature and turgor.  Nails x10 are elongated, thickened, dystrophic, and with subungual debris.  HPK that is pre ulcerative noted subfirst, third, and fifth metatarsal head bilaterally.  There is some superficial breakdown under 2nd met head of left foot. No acute SOI or other concerns.  2. Vascular: Pedal pulses are palpable, capillary refill normal.   3. Musculoskeletal: All muscle groups are graded 5 out of 5 in the foot and ankle.  Flexion contracture of lesser digits appreciated.  Prominent metatarsal heads noted bilaterally.   4. Neurological: Normal sharp dull sensation; reflexes normal.          ASSESSMENT AND PLAN:   Diagnoses and all orders for this visit:    Onychomycosis    Metatarsalgia of both feet    Hammer toes of both feet    Pre-ulcerative calluses    Foot callus    Stage 3 chronic kidney disease, unspecified whether stage 3a or 3b CKD ([facility])        Plan:     -Patient examined, chart history reviewed.  -Discussed importance of proper pedal hygiene, regular foot checks, and tight glucose control.  -Sharply debrided nails x10 with a sterile nail nipper achieving a 20% reduction in thickness and length, without incident. Nails further smoothed with dremel.   -Pared HPKs x2 with #15 blade to healthy tissue without incident.  Patient to ambulate with supportive shoes and avoid walking barefoot.  There is some superficial breakdown under left second metatarsal head.  This was dressed with bacitracin and Band-Aid.  Patient can monitor for routine healing.  -Educated patient on acute signs of infection advised patient to seek immediate medical attention if symptoms arise.    The patient indicates understanding of these issues and agrees to the plan.    RTC 2 months.    Apolniar Covarrubias DPM

## 2025-04-16 ENCOUNTER — OFFICE VISIT (OUTPATIENT)
Dept: PODIATRY CLINIC | Facility: CLINIC | Age: 85
End: 2025-04-16
Payer: MEDICARE

## 2025-04-16 DIAGNOSIS — B35.1 ONYCHOMYCOSIS: Primary | ICD-10-CM

## 2025-04-16 DIAGNOSIS — L84 FOOT CALLUS: ICD-10-CM

## 2025-04-16 DIAGNOSIS — L84 PRE-ULCERATIVE CALLUSES: ICD-10-CM

## 2025-04-16 DIAGNOSIS — M20.41 HAMMER TOES OF BOTH FEET: ICD-10-CM

## 2025-04-16 DIAGNOSIS — N18.30 STAGE 3 CHRONIC KIDNEY DISEASE, UNSPECIFIED WHETHER STAGE 3A OR 3B CKD (HCC): ICD-10-CM

## 2025-04-16 DIAGNOSIS — M77.42 METATARSALGIA OF BOTH FEET: ICD-10-CM

## 2025-04-16 DIAGNOSIS — M20.42 HAMMER TOES OF BOTH FEET: ICD-10-CM

## 2025-04-16 DIAGNOSIS — M77.41 METATARSALGIA OF BOTH FEET: ICD-10-CM

## 2025-04-16 PROCEDURE — 99213 OFFICE O/P EST LOW 20 MIN: CPT | Performed by: PODIATRIST

## 2025-04-19 NOTE — PROGRESS NOTES
Nick Kemp is a 84 year old male.   Chief Complaint   Patient presents with    Toenail Care     Nail trim and foot check f/u- also here for R foot callus check - LOV w/ PCP Dr. Pantoja on 11/14/2024         HPI:   Gentleman presents to the clinic for routine nail care and a foot checkup.  He has known history of chronic kidney disease.  He cannot provide self-care.  At today's visit reviewed nurse's history as taken above, allergies medications and medical history as documented below.  All changes duly noted  Allergies: Patient has no known allergies.   Current Medications[1]   Past Medical History[2]   Past Surgical History[3]   Family History[4]   Social Hx on file[5]        REVIEW OF SYSTEMS:   Today reviewed systens as documented below  GENERAL HEALTH: feels well otherwise  SKIN: Refer to exam below  RESPIRATORY: denies shortness of breath with exertion  CARDIOVASCULAR: denies chest pain on exertion  GI: denies abdominal pain and denies heartburn  NEURO: denies headaches    EXAM:   There were no vitals taken for this visit.  GENERAL: well developed, well nourished, in no apparent distress  EXTREMITIES:   1. Integument: His feet is warm dry and supple his nails all of them have thickening with dystrophy subungual debris subungual keratosis.  They are elongated.  He has hyperkeratoses underneath the third metatarsal head of the left foot which is preulcerative in the fifth metatarsal head bilaterally.  There are no signs of infection no drainage noted   2. Vascular: Patient has palpable pulses that are symmetrical and equivocal bilateral immediate capillary turn to the pedal digits   3. Neurologic: Patient has intact sensorium there is no deficits noted   4. Musculoskeletal: Gross fired 5 out of 5 against resistance.  He has flexion contractures of the digits which do not reduce on forefoot loading and cannot be manipulated straight.  He has some atrophy of the fat pad as well which in part is due to the  chronic hammertoes pulling the fat pad forward.    ASSESSMENT AND PLAN:   Diagnoses and all orders for this visit:    Onychomycosis    Metatarsalgia of both feet    Hammer toes of both feet    Stage 3 chronic kidney disease, unspecified whether stage 3a or 3b CKD (HCC)    Pre-ulcerative calluses    Foot callus        Plan: Today using a nail nippers were trimmed and debrided toenails 1-5 manually and mechanically in girth and width as far down to healthy tissue as possible on both feet.  This was done uneventfully there was no hemorrhage.  There is mild incurvation of the medial and lateral nail borders of the hallux which using a slant back technique were removed and they would not get ingrown.   Today using a sterile 15 blade trim down debride all hyperkeratoses there was a slight hemorrhage underneath the third metatarsal head lesion he will just monitor that it was treated with silver nitrate antibiotic ointment and a Band-Aid which she can reapply for the next few days if needed follow-up every 2 to 3 months for care but sooner if required.    The patient indicates understanding of these issues and agrees to the plan.    Nick Thomas DPM       [1]   Current Outpatient Medications   Medication Sig Dispense Refill    levoFLOXacin 500 MG Oral Tab Take 1 tablet (500 mg total) by mouth daily. 10 tablet 0    mupirocin 2 % External Ointment Apply to the ulcer on the bottom of the left foot once daily after cleansing during your shower or bath.  Apply a large absorbent Band-Aid. 30 g 1    hydrALAZINE 50 MG Oral Tab Take 1 tablet (50 mg total) by mouth in the morning and 1 tablet (50 mg total) before bedtime.      furosemide 20 MG Oral Tab Take 1 tablet (20 mg total) by mouth daily as needed (leg swelling).      metoprolol succinate ER 25 MG Oral Tablet 24 Hr Take 1 tablet (25 mg total) by mouth 2 (two) times daily. 60 tablet 1    acetaminophen 325 MG Oral Tab Take 1 tablet (325 mg total) by mouth in the morning  and 1 tablet (325 mg total) before bedtime. 1 in the morning and 1 at night.      rivaroxaban (XARELTO) 20 MG Oral Tab TAKE 1 TABLET BY MOUTH EVERY DAY WITH FOOD 90 tablet 3    famotidine 10 MG Oral Tab Take 1 tablet (10 mg total) by mouth at bedtime.     [2]   Past Medical History:   Anesthesia complication    Aortic insufficiency    Arrhythmia    Atrial flutter (HCC)    Bad breath    possibly d/t GERD    Cancer (HCC)    basal cell skin cancer    Cataract    Cholelithiasis    Coronary atherosclerosis of unspecified type of vessel, native or graft    Difficult intubation    Dr. Sol gave letter - will bring with him    DVT (deep venous thrombosis) (HCC)    Earache    secondary to TMJ    Esophageal reflux    Hearing impairment    HEART ATTACK    HIGH BLOOD PRESSURE    HIGH CHOLESTEROL    Hypercholesterolemia    Long term (current) use of anticoagulants    Nephrotic syndrome    Other and unspecified hyperlipidemia    PVC's (premature ventricular contractions)    Renal disorder    Shortness of breath    Syncope    Testicular pain    likely from under treated epididymitis    Unspecified essential hypertension    Visual impairment    glasses   [3]   Past Surgical History:  Procedure Laterality Date    Cabg  1993    triple bypass    Cataract      Cath percutaneous  transluminal coronary angioplasty      early 1990's     Cholecystectomy      Colonoscopy  9/8/2004, 2011    x2    Open heart surgery (pbp)      Skin surgery Right 8-11-14    Excision for BCC, nodular to right upper back, [place]    Skin surgery Left 8/21/14    MMS for BCC-nod to the L Nasal Supratip    Skin surgery  9/16/16    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    Splint, hammer toe      Tonsillectomy     [4]   Family History  Problem Relation Age of Onset    Heart Disease Father     Other (Jacqueline Gehrig's disease) Brother    [5]   Social History  Socioeconomic History    Marital status:     Number of children: 1   Occupational History    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

## 2025-05-07 ENCOUNTER — OFFICE VISIT (OUTPATIENT)
Dept: PODIATRY CLINIC | Facility: CLINIC | Age: 85
End: 2025-05-07

## 2025-05-07 DIAGNOSIS — N18.30 STAGE 3 CHRONIC KIDNEY DISEASE, UNSPECIFIED WHETHER STAGE 3A OR 3B CKD (HCC): ICD-10-CM

## 2025-05-07 DIAGNOSIS — M77.41 METATARSALGIA OF BOTH FEET: Primary | ICD-10-CM

## 2025-05-07 DIAGNOSIS — L84 FOOT CALLUS: ICD-10-CM

## 2025-05-07 DIAGNOSIS — M77.42 METATARSALGIA OF BOTH FEET: Primary | ICD-10-CM

## 2025-05-07 PROCEDURE — 99213 OFFICE O/P EST LOW 20 MIN: CPT | Performed by: PODIATRIST

## 2025-05-11 NOTE — PROGRESS NOTES
Nick Kemp is a 84 year old male.   Chief Complaint   Patient presents with    Toenail Care     F/u toenail and callus care. Elderly patient unable to trim toenails due to medical conditions  On 11/14/2024 LOV with PCP Toby Elizondo MD            HPI:   Patient returns the clinic for medically toenail care but also just to have his calluses checked he has some small ulcerations beginning.  Relates everything looks well-healed.  At today's visit reviewed nurse's history as taken above, allergies medications and medical history as documented below.  All changes duly noted  Allergies: Patient has no known allergies.   Current Medications[1]   Past Medical History[2]   Past Surgical History[3]   Family History[4]   Social Hx on file[5]        REVIEW OF SYSTEMS:   Today reviewed systens as documented below  GENERAL HEALTH: feels well otherwise  SKIN: Refer to exam below  RESPIRATORY: denies shortness of breath with exertion  CARDIOVASCULAR: denies chest pain on exertion  GI: denies abdominal pain and denies heartburn  NEURO: denies headaches    EXAM:   There were no vitals taken for this visit.  GENERAL: well developed, well nourished, in no apparent distress  EXTREMITIES:   1. Integument: Skin is intact there are no ulcerations noted just hyperkeratoses.  Nails are mildly thickened   2. Vascular: Patient has palpable weakened pulses bilateral   3. Neurologic: Patient has intact sensorium   4. Musculoskeletal: Patient has good muscle strength he is ambulatory.    ASSESSMENT AND PLAN:   Diagnoses and all orders for this visit:    Metatarsalgia of both feet    Stage 3 chronic kidney disease, unspecified whether stage 3a or 3b CKD (HCC)    Foot callus        Plan: Today trimmed down and debrided all hyperkeratoses using a sterile 15 blade uneventfully.  Trimmed and debrided toenails 1-5 Manja mechanically as far down to healthy tissue as possible in girth and with about 20 to 30%.  Follow-up every 2 to 3 months  for care but sooner if required.    The patient indicates understanding of these issues and agrees to the plan.    Nick Thomas DPM       [1]   Current Outpatient Medications   Medication Sig Dispense Refill    hydrALAZINE 50 MG Oral Tab Take 1 tablet (50 mg total) by mouth in the morning and 1 tablet (50 mg total) before bedtime.      furosemide 20 MG Oral Tab Take 1 tablet (20 mg total) by mouth daily as needed (leg swelling).      metoprolol succinate ER 25 MG Oral Tablet 24 Hr Take 1 tablet (25 mg total) by mouth 2 (two) times daily. 60 tablet 1    acetaminophen 325 MG Oral Tab Take 1 tablet (325 mg total) by mouth in the morning and 1 tablet (325 mg total) before bedtime. 1 in the morning and 1 at night.      rivaroxaban (XARELTO) 20 MG Oral Tab TAKE 1 TABLET BY MOUTH EVERY DAY WITH FOOD 90 tablet 3    famotidine 10 MG Oral Tab Take 1 tablet (10 mg total) by mouth at bedtime.      mupirocin 2 % External Ointment Apply to the ulcer on the bottom of the left foot once daily after cleansing during your shower or bath.  Apply a large absorbent Band-Aid. 30 g 1   [2]   Past Medical History:   Anesthesia complication    Aortic insufficiency    Arrhythmia    Atrial flutter (HCC)    Bad breath    possibly d/t GERD    Cancer (HCC)    basal cell skin cancer    Cataract    Cholelithiasis    Coronary atherosclerosis of unspecified type of vessel, native or graft    Difficult intubation    Dr. Sol gave letter - will bring with him    DVT (deep venous thrombosis) (HCC)    Earache    secondary to TMJ    Esophageal reflux    Hearing impairment    HEART ATTACK    HIGH BLOOD PRESSURE    HIGH CHOLESTEROL    Hypercholesterolemia    Long term (current) use of anticoagulants    Nephrotic syndrome    Other and unspecified hyperlipidemia    PVC's (premature ventricular contractions)    Renal disorder    Shortness of breath    Syncope    Testicular pain    likely from under treated epididymitis    Unspecified essential  hypertension    Visual impairment    glasses   [3]   Past Surgical History:  Procedure Laterality Date    Cabg      triple bypass    Cataract      Cath percutaneous  transluminal coronary angioplasty      early      Cholecystectomy      Colonoscopy  2004, 2011    x2    Open heart surgery (pbp)      Skin surgery Right 14    Excision for BCC, nodular to right upper back, [place]    Skin surgery Left 14    MMS for BCC-nod to the L Nasal Supratip    Skin surgery  16    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    Splint, hammer toe      Tonsillectomy     [4]   Family History  Problem Relation Age of Onset    Heart Disease Father     Other (Jacqueline Gehrig's disease) Brother    [5]   Social History  Socioeconomic History    Marital status:     Number of children: 1   Occupational History    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

## 2025-05-14 RX ORDER — ROSUVASTATIN CALCIUM 10 MG/1
10 TABLET, COATED ORAL NIGHTLY
COMMUNITY
Start: 2025-03-14 | End: 2026-03-09

## 2025-05-14 NOTE — PROGRESS NOTES
Subjective:   Patient ID: Nick Kemp is a 84 year old male.    HPI  Nick Kemp is a 84 year old male.     HPI:   Patient presents for recheck of his hypertension, HLD and PAF. Pt has been taking medications as instructed, no medication side effects, home BP monitoring in the range of 130's systolic and 70's diastolic.  Denies cp or sob  Wife reports pt is having dementia and memory loss.   No infections sxs    Wt Readings from Last 6 Encounters:   05/15/25 204 lb (92.5 kg)   01/13/25 206 lb (93.4 kg)   11/14/24 205 lb (93 kg)   05/23/24 207 lb (93.9 kg)   05/03/24 207 lb 9.6 oz (94.2 kg)   12/27/23 207 lb (93.9 kg)     Body mass index is 26.19 kg/m².    Lab Results   Component Value Date    CHOLEST 107 11/19/2024    CHOLEST 133 10/18/2023    CHOLEST 147 05/06/2021     Lab Results   Component Value Date    HDL 38 (L) 11/19/2024    HDL 41 10/18/2023    HDL 37 (L) 05/06/2021     Lab Results   Component Value Date    TRIG 99 11/19/2024    TRIG 140 10/18/2023    TRIG 124 05/06/2021    TRIGLY 116 04/16/2015     Lab Results   Component Value Date    LDL 50 11/19/2024    LDL 67 10/18/2023    LDL 85 05/06/2021     Lab Results   Component Value Date    AST 18 11/19/2024    AST 20 05/02/2024    AST 20 12/28/2023     Lab Results   Component Value Date    ALT 15 11/19/2024    ALT 20 05/02/2024    ALT 18 12/28/2023     Lab Results   Component Value Date    GLU 99 11/19/2024    GLU 98 05/04/2024     (H) 05/02/2024    GLUCOSE 103 (H) 04/16/2015    GLUCOSE 96 10/23/2014       Current Medications[1]   Past Medical History[2]   Past Surgical History[3]   Social History:    Short Social Hx on File[4]      REVIEW OF SYSTEMS:   GENERAL HEALTH: feels well otherwise  SKIN: denies any unusual skin lesions or rashes  RESPIRATORY: denies shortness of breath with exertion  CARDIOVASCULAR: denies chest pain on exertion  GI: denies abdominal pain and denies heartburn  NEURO: denies headaches    EXAM:   /80   Pulse  71   Temp 98.1 °F (36.7 °C)   Resp 16   Ht 6' 2\" (1.88 m)   Wt 204 lb (92.5 kg)   SpO2 99%   BMI 26.19 kg/m²   GENERAL: well developed, well nourished,in no apparent distress  SKIN: no rashes,no suspicious lesions  HEENT: atraumatic, normocephalic,ears and throat are clear  NECK: supple,no adenopathy,no bruits  LUNGS: clear to auscultation  CARDIO: RRR without murmur  GI: good BS's,no masses, HSM or tenderness  EXTREMITIES: no cyanosis, clubbing or edema    ASSESSMENT AND PLAN:   Pt presents for a recheck of his hypertension, HLD and PAFwell controlled, no significant medication side effects noted.    PAF is rate controlled. Cont DOAC  PLAN: will continue present medications, reviewed diet, exercise and weight control.     Memory loss- no driving recommendations.  Refer to neuro for eval memory loss  The patient indicates understanding of these issues and agrees to the plan.  The patient is asked to return in 6 m.    History/Other:   Review of Systems  Current Medications[5]  Allergies:Allergies[6]    Objective:   Physical Exam    Assessment & Plan:   1. Benign essential HTN    2. Atrial flutter, paroxysmal (HCC)    3. Mixed hyperlipidemia        No orders of the defined types were placed in this encounter.      Meds This Visit:  Requested Prescriptions      No prescriptions requested or ordered in this encounter       Imaging & Referrals:  None         [1]   Current Outpatient Medications   Medication Sig Dispense Refill    rosuvastatin 10 MG Oral Tab Take 1 tablet (10 mg total) by mouth nightly.      hydrALAZINE 50 MG Oral Tab Take 1 tablet (50 mg total) by mouth in the morning and 1 tablet (50 mg total) before bedtime.      furosemide 20 MG Oral Tab Take 1 tablet (20 mg total) by mouth daily as needed (leg swelling).      metoprolol succinate ER 25 MG Oral Tablet 24 Hr Take 1 tablet (25 mg total) by mouth 2 (two) times daily. 60 tablet 1    acetaminophen 325 MG Oral Tab Take 1 tablet (325 mg total) by mouth  in the morning and 1 tablet (325 mg total) before bedtime. 1 in the morning and 1 at night.      rivaroxaban (XARELTO) 20 MG Oral Tab TAKE 1 TABLET BY MOUTH EVERY DAY WITH FOOD 90 tablet 3    famotidine 10 MG Oral Tab Take 1 tablet (10 mg total) by mouth at bedtime.     [2]   Past Medical History:   Anesthesia complication    Aortic insufficiency    Arrhythmia    Atrial flutter (HCC)    Bad breath    possibly d/t GERD    Cancer (HCC)    basal cell skin cancer    Cataract    Cholelithiasis    Coronary atherosclerosis of unspecified type of vessel, native or graft    Difficult intubation    Dr. Sol gave letter - will bring with him    DVT (deep venous thrombosis) (HCC)    Earache    secondary to TMJ    Esophageal reflux    Hearing impairment    HEART ATTACK    HIGH BLOOD PRESSURE    HIGH CHOLESTEROL    Hypercholesterolemia    Long term (current) use of anticoagulants    Nephrotic syndrome    Other and unspecified hyperlipidemia    PVC's (premature ventricular contractions)    Renal disorder    Shortness of breath    Syncope    Testicular pain    likely from under treated epididymitis    Unspecified essential hypertension    Visual impairment    glasses   [3]   Past Surgical History:  Procedure Laterality Date    Cabg  1993    triple bypass    Cataract      Cath percutaneous  transluminal coronary angioplasty      early 1990's     Cholecystectomy      Colonoscopy  9/8/2004, 2011    x2    Open heart surgery (pbp)      Skin surgery Right 8-11-14    Excision for BCC, nodular to right upper back, [place]    Skin surgery Left 8/21/14    MMS for BCC-nod to the L Nasal Supratip    Skin surgery  9/16/16    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    Splint, hammer toe      Tonsillectomy     [4]   Social History  Socioeconomic History    Marital status:     Number of children: 1   Occupational History    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    Tobacco Use    Smoking status: Former     Current packs/day: 0.00     Types:  Cigarettes     Quit date: 1976     Years since quittin.8    Smokeless tobacco: Never   Vaping Use    Vaping status: Never Used   Substance and Sexual Activity    Alcohol use: Yes     Comment: occasionally    Drug use: No   Other Topics Concern    Caffeine Concern Yes     Comment: 4-5 coffees, tea once a day    Exercise No     Social Drivers of Health     Food Insecurity: No Food Insecurity (5/3/2024)    Food Insecurity     Food Insecurity: Never true   Transportation Needs: No Transportation Needs (5/3/2024)    Transportation Needs     Lack of Transportation: No   Housing Stability: Low Risk  (5/3/2024)    Housing Stability     Housing Instability: No   [5]   Current Outpatient Medications   Medication Sig Dispense Refill    rosuvastatin 10 MG Oral Tab Take 1 tablet (10 mg total) by mouth nightly.      hydrALAZINE 50 MG Oral Tab Take 1 tablet (50 mg total) by mouth in the morning and 1 tablet (50 mg total) before bedtime.      furosemide 20 MG Oral Tab Take 1 tablet (20 mg total) by mouth daily as needed (leg swelling).      metoprolol succinate ER 25 MG Oral Tablet 24 Hr Take 1 tablet (25 mg total) by mouth 2 (two) times daily. 60 tablet 1    acetaminophen 325 MG Oral Tab Take 1 tablet (325 mg total) by mouth in the morning and 1 tablet (325 mg total) before bedtime. 1 in the morning and 1 at night.      rivaroxaban (XARELTO) 20 MG Oral Tab TAKE 1 TABLET BY MOUTH EVERY DAY WITH FOOD 90 tablet 3    famotidine 10 MG Oral Tab Take 1 tablet (10 mg total) by mouth at bedtime.     [6] No Known Allergies

## 2025-05-15 ENCOUNTER — OFFICE VISIT (OUTPATIENT)
Dept: INTERNAL MEDICINE CLINIC | Facility: CLINIC | Age: 85
End: 2025-05-15
Payer: MEDICARE

## 2025-05-15 ENCOUNTER — LAB ENCOUNTER (OUTPATIENT)
Dept: LAB | Age: 85
End: 2025-05-15
Attending: FAMILY MEDICINE
Payer: MEDICARE

## 2025-05-15 ENCOUNTER — PATIENT MESSAGE (OUTPATIENT)
Dept: INTERNAL MEDICINE CLINIC | Facility: CLINIC | Age: 85
End: 2025-05-15

## 2025-05-15 VITALS
OXYGEN SATURATION: 99 % | WEIGHT: 204 LBS | BODY MASS INDEX: 26.18 KG/M2 | SYSTOLIC BLOOD PRESSURE: 134 MMHG | DIASTOLIC BLOOD PRESSURE: 80 MMHG | RESPIRATION RATE: 16 BRPM | HEIGHT: 74 IN | HEART RATE: 71 BPM | TEMPERATURE: 98 F

## 2025-05-15 DIAGNOSIS — R41.3 MEMORY CHANGES: ICD-10-CM

## 2025-05-15 DIAGNOSIS — I48.92 ATRIAL FLUTTER, PAROXYSMAL (HCC): ICD-10-CM

## 2025-05-15 DIAGNOSIS — E78.2 MIXED HYPERLIPIDEMIA: ICD-10-CM

## 2025-05-15 DIAGNOSIS — I10 BENIGN ESSENTIAL HTN: ICD-10-CM

## 2025-05-15 DIAGNOSIS — I10 BENIGN ESSENTIAL HTN: Primary | ICD-10-CM

## 2025-05-15 DIAGNOSIS — R35.0 URINARY FREQUENCY: ICD-10-CM

## 2025-05-15 LAB
ALBUMIN SERPL-MCNC: 3.9 G/DL (ref 3.2–4.8)
ALBUMIN/GLOB SERPL: 1.3 {RATIO} (ref 1–2)
ALP LIVER SERPL-CCNC: 65 U/L (ref 45–117)
ALT SERPL-CCNC: 20 U/L (ref 10–49)
ANION GAP SERPL CALC-SCNC: 8 MMOL/L (ref 0–18)
AST SERPL-CCNC: 23 U/L (ref ?–34)
BASOPHILS # BLD AUTO: 0.07 X10(3) UL (ref 0–0.2)
BASOPHILS NFR BLD AUTO: 0.8 %
BILIRUB SERPL-MCNC: 0.6 MG/DL (ref 0.2–1.1)
BILIRUB UR QL STRIP.AUTO: NEGATIVE
BUN BLD-MCNC: 25 MG/DL (ref 9–23)
CALCIUM BLD-MCNC: 9.6 MG/DL (ref 8.7–10.6)
CHLORIDE SERPL-SCNC: 109 MMOL/L (ref 98–112)
CLARITY UR REFRACT.AUTO: CLEAR
CO2 SERPL-SCNC: 21 MMOL/L (ref 21–32)
CREAT BLD-MCNC: 1.1 MG/DL (ref 0.7–1.3)
EGFRCR SERPLBLD CKD-EPI 2021: 65 ML/MIN/1.73M2 (ref 60–?)
EOSINOPHIL # BLD AUTO: 0.11 X10(3) UL (ref 0–0.7)
EOSINOPHIL NFR BLD AUTO: 1.2 %
ERYTHROCYTE [DISTWIDTH] IN BLOOD BY AUTOMATED COUNT: 13.5 %
FASTING STATUS PATIENT QL REPORTED: NO
GLOBULIN PLAS-MCNC: 3 G/DL (ref 2–3.5)
GLUCOSE BLD-MCNC: 93 MG/DL (ref 70–99)
GLUCOSE UR STRIP.AUTO-MCNC: NORMAL MG/DL
HCT VFR BLD AUTO: 46.7 % (ref 39–53)
HGB BLD-MCNC: 15.3 G/DL (ref 13–17.5)
IMM GRANULOCYTES # BLD AUTO: 0.03 X10(3) UL (ref 0–1)
IMM GRANULOCYTES NFR BLD: 0.3 %
KETONES UR STRIP.AUTO-MCNC: NEGATIVE MG/DL
LEUKOCYTE ESTERASE UR QL STRIP.AUTO: NEGATIVE
LYMPHOCYTES # BLD AUTO: 2.42 X10(3) UL (ref 1–4)
LYMPHOCYTES NFR BLD AUTO: 26.4 %
MCH RBC QN AUTO: 29.1 PG (ref 26–34)
MCHC RBC AUTO-ENTMCNC: 32.8 G/DL (ref 31–37)
MCV RBC AUTO: 88.8 FL (ref 80–100)
MONOCYTES # BLD AUTO: 1.01 X10(3) UL (ref 0.1–1)
MONOCYTES NFR BLD AUTO: 11 %
NEUTROPHILS # BLD AUTO: 5.51 X10 (3) UL (ref 1.5–7.7)
NEUTROPHILS # BLD AUTO: 5.51 X10(3) UL (ref 1.5–7.7)
NEUTROPHILS NFR BLD AUTO: 60.3 %
NITRITE UR QL STRIP.AUTO: NEGATIVE
OSMOLALITY SERPL CALC.SUM OF ELEC: 290 MOSM/KG (ref 275–295)
PH UR STRIP.AUTO: 5 [PH] (ref 5–8)
PLATELET # BLD AUTO: 226 10(3)UL (ref 150–450)
POTASSIUM SERPL-SCNC: 4.1 MMOL/L (ref 3.5–5.1)
PROT SERPL-MCNC: 6.9 G/DL (ref 5.7–8.2)
PROT UR STRIP.AUTO-MCNC: 30 MG/DL
RBC # BLD AUTO: 5.26 X10(6)UL (ref 3.8–5.8)
RBC UR QL AUTO: NEGATIVE
SODIUM SERPL-SCNC: 138 MMOL/L (ref 136–145)
SP GR UR STRIP.AUTO: 1.02 (ref 1–1.03)
UROBILINOGEN UR STRIP.AUTO-MCNC: NORMAL MG/DL
WBC # BLD AUTO: 9.2 X10(3) UL (ref 4–11)

## 2025-05-15 PROCEDURE — 87086 URINE CULTURE/COLONY COUNT: CPT

## 2025-05-15 PROCEDURE — 80053 COMPREHEN METABOLIC PANEL: CPT

## 2025-05-15 PROCEDURE — 81001 URINALYSIS AUTO W/SCOPE: CPT

## 2025-05-15 PROCEDURE — 36415 COLL VENOUS BLD VENIPUNCTURE: CPT

## 2025-05-15 PROCEDURE — 99214 OFFICE O/P EST MOD 30 MIN: CPT | Performed by: INTERNAL MEDICINE

## 2025-05-15 PROCEDURE — 85025 COMPLETE CBC W/AUTO DIFF WBC: CPT

## 2025-06-18 ENCOUNTER — OFFICE VISIT (OUTPATIENT)
Dept: PODIATRY CLINIC | Facility: CLINIC | Age: 85
End: 2025-06-18

## 2025-06-18 DIAGNOSIS — L84 FOOT CALLUS: ICD-10-CM

## 2025-06-18 DIAGNOSIS — L84 PRE-ULCERATIVE CALLUSES: ICD-10-CM

## 2025-06-18 DIAGNOSIS — M20.41 HAMMER TOES OF BOTH FEET: ICD-10-CM

## 2025-06-18 DIAGNOSIS — M20.42 HAMMER TOES OF BOTH FEET: ICD-10-CM

## 2025-06-18 DIAGNOSIS — M77.42 METATARSALGIA OF BOTH FEET: ICD-10-CM

## 2025-06-18 DIAGNOSIS — B35.1 ONYCHOMYCOSIS: Primary | ICD-10-CM

## 2025-06-18 DIAGNOSIS — N18.30 STAGE 3 CHRONIC KIDNEY DISEASE, UNSPECIFIED WHETHER STAGE 3A OR 3B CKD (HCC): ICD-10-CM

## 2025-06-18 DIAGNOSIS — M77.41 METATARSALGIA OF BOTH FEET: ICD-10-CM

## 2025-06-18 PROCEDURE — 99213 OFFICE O/P EST LOW 20 MIN: CPT | Performed by: STUDENT IN AN ORGANIZED HEALTH CARE EDUCATION/TRAINING PROGRAM

## 2025-06-19 NOTE — PROGRESS NOTES
[facility]  Progress Note    Nick Kemp is a 84 year old male.   Chief Complaint   Patient presents with    Toenail Care     Nail trim and foot check f/u- LOV w/ PCP Dr. Pantoja on 5/15/25         HPI:     Patient is a pleasant 84-year-old male with past medical history of CKD who presents to clinic for foot exam.  He relates that his prior preulcerative callus/wound sites appear resolved.  He has elongated nails and thickened calluses he has difficulty trimming on his own.  He continues to ambulate with supportive new balance tennis shoes.  No other complaints are mentioned at this time.  Past medical history, medications, and allergies reviewed.        Allergies: Patient has no known allergies.   Current Outpatient Medications   Medication Sig Dispense Refill    rosuvastatin 10 MG Oral Tab Take 1 tablet (10 mg total) by mouth nightly.      hydrALAZINE 50 MG Oral Tab Take 1 tablet (50 mg total) by mouth in the morning and 1 tablet (50 mg total) before bedtime.      furosemide 20 MG Oral Tab Take 1 tablet (20 mg total) by mouth daily as needed (leg swelling).      metoprolol succinate ER 25 MG Oral Tablet 24 Hr Take 1 tablet (25 mg total) by mouth 2 (two) times daily. 60 tablet 1    acetaminophen 325 MG Oral Tab Take 1 tablet (325 mg total) by mouth in the morning and 1 tablet (325 mg total) before bedtime. 1 in the morning and 1 at night.      rivaroxaban (XARELTO) 20 MG Oral Tab TAKE 1 TABLET BY MOUTH EVERY DAY WITH FOOD 90 tablet 3    famotidine 10 MG Oral Tab Take 1 tablet (10 mg total) by mouth at bedtime.        Past Medical History:    Anesthesia complication    Aortic insufficiency    Arrhythmia    Atrial flutter (HCC)    Bad breath    possibly d/t GERD    Cancer (HCC)    basal cell skin cancer    Cataract    Cholelithiasis    Coronary atherosclerosis of unspecified type of vessel, native or graft    Difficult intubation    Dr. Sol gave letter - will bring with him    DVT (deep venous  thrombosis) (HCC)    Earache    secondary to TMJ    Esophageal reflux    Hearing impairment    HEART ATTACK    HIGH BLOOD PRESSURE    HIGH CHOLESTEROL    Hypercholesterolemia    Long term (current) use of anticoagulants    Nephrotic syndrome    Other and unspecified hyperlipidemia    PVC's (premature ventricular contractions)    Renal disorder    Shortness of breath    Syncope    Testicular pain    likely from under treated epididymitis    Unspecified essential hypertension    Visual impairment    glasses      Past Surgical History:   Procedure Laterality Date    Cabg      triple bypass    Cataract      Cath percutaneous  transluminal coronary angioplasty      early      Cholecystectomy      Colonoscopy  2004, 2011    x2    Open heart surgery (pbp)      Skin surgery Right 14    Excision for BCC, nodular to right upper back, [place]    Skin surgery Left 14    MMS for BCC-nod to the L Nasal Supratip    Skin surgery  16    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    Splint, hammer toe      Tonsillectomy        Family History   Problem Relation Age of Onset    Heart Disease Father     Other (Jacqueline Gehrig's disease) Brother       Social History     Socioeconomic History    Marital status:     Number of children: 1   Occupational History    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    Tobacco Use    Smoking status: Former     Current packs/day: 0.00     Types: Cigarettes     Quit date: 1976     Years since quittin.9    Smokeless tobacco: Never   Vaping Use    Vaping status: Never Used   Substance and Sexual Activity    Alcohol use: Yes     Comment: occasionally    Drug use: No   Other Topics Concern    Caffeine Concern Yes     Comment: 4-5 coffees, tea once a day    Exercise No           REVIEW OF SYSTEMS:     Denies nausea, vomiting, fever, chills.      EXAM:   There were no vitals taken for this visit.  GENERAL: well developed, well nourished, in no apparent  distress  EXTREMITIES:   1. Integument: Normal skin temperature and turgor.  Nails x10 are elongated, thickened, dystrophic, and with subungual debris.  HPK that is pre ulcerative noted subfirst, third, and fifth metatarsal head bilaterally.  There is some superficial breakdown under 2nd met head of left foot. No acute SOI or other concerns.  2. Vascular: Pedal pulses are palpable, capillary refill normal.   3. Musculoskeletal: All muscle groups are graded 5 out of 5 in the foot and ankle.  Flexion contracture of lesser digits appreciated.  Prominent metatarsal heads noted bilaterally.   4. Neurological: Normal sharp dull sensation; reflexes normal.          ASSESSMENT AND PLAN:   Diagnoses and all orders for this visit:    Onychomycosis    Foot callus    Pre-ulcerative calluses    Hammer toes of both feet    Metatarsalgia of both feet    Stage 3 chronic kidney disease, unspecified whether stage 3a or 3b CKD ([facility])        Plan:     -Patient examined, chart history reviewed.  -Discussed importance of proper pedal hygiene, regular foot checks, and tight glucose control.  -Sharply debrided nails x10 with a sterile nail nipper achieving a 20% reduction in thickness and length, without incident. Nails further smoothed with dremel. Pinpoint bleeding controlled with silver nitrate and dressed with bacitracin and band-aid.  -Pared HPKs x2 with #15 blade to healthy tissue without incident.  Patient to ambulate with supportive shoes and avoid walking barefoot. -Educated patient on acute signs of infection advised patient to seek immediate medical attention if symptoms arise.    The patient indicates understanding of these issues and agrees to the plan.    RTC 2 months.    Apolinar Covarrubias DPM

## 2025-07-31 ENCOUNTER — OFFICE VISIT (OUTPATIENT)
Dept: NEUROLOGY | Facility: CLINIC | Age: 85
End: 2025-07-31

## 2025-07-31 VITALS
HEART RATE: 84 BPM | DIASTOLIC BLOOD PRESSURE: 68 MMHG | RESPIRATION RATE: 18 BRPM | HEIGHT: 74 IN | BODY MASS INDEX: 26.05 KG/M2 | SYSTOLIC BLOOD PRESSURE: 138 MMHG | OXYGEN SATURATION: 97 % | WEIGHT: 203 LBS

## 2025-07-31 DIAGNOSIS — R41.3 MEMORY LOSS: Primary | ICD-10-CM

## 2025-07-31 DIAGNOSIS — F03.B0 MODERATE DEMENTIA WITHOUT BEHAVIORAL DISTURBANCE, PSYCHOTIC DISTURBANCE, MOOD DISTURBANCE, OR ANXIETY, UNSPECIFIED DEMENTIA TYPE (HCC): ICD-10-CM

## 2025-07-31 PROCEDURE — 99214 OFFICE O/P EST MOD 30 MIN: CPT | Performed by: PHYSICIAN ASSISTANT

## 2025-07-31 RX ORDER — MEMANTINE HYDROCHLORIDE 5 MG/1
TABLET ORAL
Qty: 60 TABLET | Refills: 5 | Status: SHIPPED | OUTPATIENT
Start: 2025-07-31

## 2025-08-02 ENCOUNTER — LAB ENCOUNTER (OUTPATIENT)
Dept: LAB | Age: 85
End: 2025-08-02
Attending: PHYSICIAN ASSISTANT

## 2025-08-02 DIAGNOSIS — R41.3 MEMORY LOSS: ICD-10-CM

## 2025-08-02 LAB
FOLATE SERPL-MCNC: 12.2 NG/ML (ref 5.4–?)
TSI SER-ACNC: 1.33 UIU/ML (ref 0.55–4.78)
VIT B12 SERPL-MCNC: 472 PG/ML (ref 211–911)

## 2025-08-02 PROCEDURE — 82607 VITAMIN B-12: CPT

## 2025-08-02 PROCEDURE — 84443 ASSAY THYROID STIM HORMONE: CPT

## 2025-08-02 PROCEDURE — 36415 COLL VENOUS BLD VENIPUNCTURE: CPT

## 2025-08-02 PROCEDURE — 82746 ASSAY OF FOLIC ACID SERUM: CPT

## 2025-08-27 ENCOUNTER — OFFICE VISIT (OUTPATIENT)
Dept: PODIATRY CLINIC | Facility: CLINIC | Age: 85
End: 2025-08-27

## 2025-08-27 DIAGNOSIS — L84 PRE-ULCERATIVE CALLUSES: ICD-10-CM

## 2025-08-27 DIAGNOSIS — M77.41 METATARSALGIA OF BOTH FEET: ICD-10-CM

## 2025-08-27 DIAGNOSIS — L84 FOOT CALLUS: ICD-10-CM

## 2025-08-27 DIAGNOSIS — N18.30 STAGE 3 CHRONIC KIDNEY DISEASE, UNSPECIFIED WHETHER STAGE 3A OR 3B CKD (HCC): ICD-10-CM

## 2025-08-27 DIAGNOSIS — M20.41 HAMMER TOES OF BOTH FEET: ICD-10-CM

## 2025-08-27 DIAGNOSIS — M20.42 HAMMER TOES OF BOTH FEET: ICD-10-CM

## 2025-08-27 DIAGNOSIS — B35.1 ONYCHOMYCOSIS: Primary | ICD-10-CM

## 2025-08-27 DIAGNOSIS — M77.42 METATARSALGIA OF BOTH FEET: ICD-10-CM

## 2025-08-27 PROCEDURE — 99213 OFFICE O/P EST LOW 20 MIN: CPT | Performed by: STUDENT IN AN ORGANIZED HEALTH CARE EDUCATION/TRAINING PROGRAM

## (undated) DIAGNOSIS — Z85.828 PERSONAL HISTORY OF OTHER MALIGNANT NEOPLASM OF SKIN: ICD-10-CM

## (undated) DIAGNOSIS — I10 ESSENTIAL HYPERTENSION: ICD-10-CM

## (undated) DIAGNOSIS — I10 ESSENTIAL HYPERTENSION, BENIGN: ICD-10-CM

## (undated) DIAGNOSIS — I48.92 ATRIAL FLUTTER, PAROXYSMAL (HCC): ICD-10-CM

## (undated) DIAGNOSIS — E78.2 MIXED HYPERLIPIDEMIA: ICD-10-CM

## (undated) NOTE — LETTER
Patient Name: Bee Garcia  YOB: 1940          MRN number:  [id number]  Date:  6/20/2023  Referring Physician:  No ref. provider found    Discharge Summary  Number of Visits Attended 12 in Physical Therapy    Dear Dr. Kishan Crockett,     Discharge Summary  Pt has attended 12 visits in Physical Therapy. Diagnosis:   Muscular deconditioning (R29.898)           Referring Provider: Dr. Kishan Crockett Date of Evaluation:    5/4/23    Precautions:   High fall risk, [place], memory     Next MD visit:   none scheduled  Date of Surgery: n/a   Insurance Primary/Secondary: MEDICARE / Gaurav Seth     # Auth Visits: 14 per POC           Subjective: Some days has been too hot, another day legs were tired. 2x outside on driveway walked some-8 laps the first time, 4-5 the second. Feels good with the rollator, likes it. Logan Salters, wife, present for session and observes/assists as historian.    Pain: not being treated for pain related diagnosis       Objective:    6MWT  (6/20/2023): 882', rollator, no seated rest breaks reports a little soreness in arms   (6/1/2023): 733', rollator, no seated rest breaks; reports B arm fatigue most fatigued (cues given to stay close to walker towards end of duration)  (5/8/2023): 575', SPC, 1 seated rest break x 1' at 1'     LE Strength:    EVAL   6/6/2023  Hip Flexion (L2/L3) R 4/5; L 4/5    Hip Abduction (L4/L5) NT    Knee Flexion (L5/S1) WNL in sitting     Knee Extension (L3/L4) R 4+/5; L 4+/5    Ankle Dorsiflexion (L4/L5) R 4-/5; L 5/5 R 4/5; L 5/5   Great Toe Dorsiflexion (l5) TBT    Hip Extension (L5/S1) Screened functionally with STS; must use hands on thighs and fatigues 4    Ankle Plantar Flexion (s1) Can complete 40% range B rise     SLR(5/17/2023): R lag ascent and descent; L often lag ascent but intermittent success descent    Postural Control: (6/20/2023)    4-Stage Balance Test:               - Feet together: EO and EC WFL, mild sway sec               - Modified Tandem: R back: EO >40s, EC >20s (at eval: EC 8s); L back EO >40s and EC >20s                - Tandem Stance:R back: 12s; L back 32s (eval: R back: 1s; L back 5s)   (must hold to get into position)     Fall Risk: yes               - SLS: unable     Fall Risk: yes  [Full tandem stance <10 sec indicates increased risk of falling]  Age appropriate norms for SLS: 80-81 y/o mean = 8.5 sec     TUG (AD, time):   (6/6/2023):   *17.76s with rollator  (eval): 17.14s SPC sec                                  Fall Risk: yes  [Performance exceeding the upper limit of confidence intervals are considered increased risk for falls; Stacy, 2006. .. 80-81 y/o mean 11.3s (10.0-12.7s)]     Functional Mobility:  30 sec sit<>stand(6/6/2023): 5 (no hands on thighs), CGA safety                         Fall Risk: yes  [Below average score indicates high risk for falls; norms by age > or = to. .. 80-81 y/o Men: 8, Women: 9        Assessment: Akash Contreras has completed 12 visits in PT for his gait, balance, deconditioning, reduced strengthening, and hx of falls. Gait trained in use of rollator as patient with hx of falls, B quad weakness L > R leading to intermittent buckling when unsupported without significant improvement during episode. Discussed should be using rollator at all times in community d/t continued fall risk. Improved static balance, tandem from 1 and 5 s at evaluation to 12 and 32s this date, 6MWT demonstrates CRISTOPHER for improvement at 56' this date with rollator. No evidence for imbalance, knee buckle with rollator. Discussed continuation with walking program, goal 5' on his flat driveway each day, or can go to store with seated rest break prn. Fair to poor compliance with HEP throughout episode of care. Issued new handout, discussed need to maintain gains and mobility. Akash Cnotreras and Gabrielle Heller appreciative and all questions answered. Discharge from formal PT at this time.      Patient was instructed in and issued a HEP for:   Mini squats 2 x 10  Half tandem with head nods 8x ea  Half tandem R/L HT 8x ea   Standing marching unilateral hold alt x 8 ea, 2 sets  SAQ 3s x 8, 2 sets   Goal: get up every hour , 5' walking on driveway with rollator ea day; 1-2x per week or bad weather trial walk [employer] but sit as needed       Goals:   (to be met in 12 visits)  Pt will demonstrate improved tandem to >10 seconds JACQUES to promote safety and decrease risk of falls on uneven surfaces such as grass (MET)  Pt will perform TUG in <14 seconds with least restrictive AD, demonstrating improved gait speed for improved participation in ADL such as community ambulation (deferred, focus on safety with rollator over speed)  PT will improve functional BLE strength to be able to get up out of standard chair without use UEs on thighs. (met intermittently)  Pt will be able to complete B SLR without lag to demonstrate quad recruitment and strength progression for ex. (50% met, can on L)   Pt will improve R DF strength to 4/5 to improve foot clearance in gait. (MET)    Pt will improve 30s STS to 8 or more to demonstrate reduced fall risk and improved LE power. (improved to 5 6/6/2023)  Pt will improve 6MWT to >825' to demonstrate CRISTOPHER for improvement for improved ability to ambulate in/out stores and doctors offices. (MET)  Pt will be independent and compliant with comprehensive HEP to maintain progress achieved in PT      Plan: Discharge from formal PT. Work on [employer]. Continue progressive increase in walking with walker as tolerated. Patient/Family/Caregiver was advised of these findings, precautions, and treatment options and has agreed to actively participate in planning and for this course of care. Thank you for your referral. If you have any questions, please contact me at Dept: [phone].     Sincerely,  Electronically signed by therapist: Aleena Bond PT     Physician's certification required:  No  Please co-sign or sign and return this letter via fax as soon as possible to [phone]. I certify the need for these services furnished under this plan of treatment and while under my care. X___________________________________________________ Date____________________    Certification From: 4/45/6144  To:9/17/2023 21st Century Cures Act Notice to Patient: Medical documents like this are made available to patients in the interest of transparency. However, be advised this is a medical document and it is intended as [id number] communication between your medical providers. This medical document may contain abbreviations, assessments, medical data, and results or other terms that are unfamiliar. Medical documents are intended to carry relevant information, facts as evident, and the clinical opinion of the practitioner. As such, this medical document may be written in language that appears blunt or direct. You are encouraged to contact your medical provider and/or [facility] Patient Experience if you have any questions about this medical document.

## (undated) NOTE — MR AVS SNAPSHOT
[address]  [address]t[facility] Health [address] [phone]               Thank you for choosing us for your health care visit with Myriam Carvajal MD.  We are glad to serve you and happy to provide you with this ayala · A low-fat diet reduces your risk for stroke and for some cancers. Unsaturated fat is most healthy  · When you must add fat, use unsaturated fat. · Unsaturated fats come from plants.  They include olive, canola, peanut, corn, avocado, safflower, and sunf Take 1 tablet by mouth  daily   Commonly known as:  LIPITOR           B COMPLEX 50 OR   Take  by mouth. Coral Calcium 1000 (390 Ca) MG Tabs   Take 2,000 mg by mouth daily.            Cyclobenzaprine HCl 10 MG Tabs   Take 1 tablet (10 mg total) by Tips for making healthy food choices  -   Enjoy your food, but eat less. Fully enjoy your food when eating. Don’t eat while distracted and slow down. Avoid over sized portions. Don’t eat while when you’re bored.      EAT THESE FOODS MORE OFTEN: EAT T

## (undated) NOTE — MR AVS SNAPSHOT
After Visit Summary   3/30/2023    Jeffrey Iniguez   MRN: [id number]           Visit Information     Date & Time  3/30/2023 12:00 PM Provider  [address] [place] [address] EEG Dept. Phone  [phone]      Allergies as of 3/30/2023  Review status set to Review Complete on 3/27/2023   No Known Allergies     Your Current Medications        Dosage    ATORVASTATIN 10 MG Oral Tab TAKE 1 TABLET BY MOUTH EVERY DAY    METOPROLOL SUCCINATE ER 50 MG Oral Tablet 24 Hr TAKE 1 TABLET BY MOUTH TWICE A DAY    HYDRALAZINE 100 MG Oral Tab TAKE 1 TABLET BY MOUTH TWICE A DAY    acetaminophen 325 MG Oral Tab Take 2 tablets (650 mg total) by mouth in the morning and 2 tablets (650 mg total) before bedtime. rivaroxaban (XARELTO) 20 MG Oral Tab TAKE 1 TABLET BY MOUTH EVERY DAY WITH FOOD    famotidine 10 MG Oral Tab Take 1 tablet (10 mg total) by mouth at bedtime. Diagnoses for This Visit    Memory loss   [780.93. ICD-9-CM]    Gait disorder   [794906]    Confusion   [683436]             We Ordered the Following     Normal Orders This Visit    EEG [NEU4 CUSTOM]       Future Appointments        Provider Department    4/14/2023 12:30 PM Krant[facility] [address], [address]    5/18/2023 11:45 AM [facility], [address] [place], [place] [address]                Did you know that [facility] primary care physicians now offer Video Visits through [address] for adult patients for a variety of conditions such as allergies, back pain and cold symptoms? Skip the drive and waiting room and online chat with a doctor face-to-face using your web-cam enabled computer or mobile device wherever you are. Video Visits cost $50 and can be paid hassle-free using a credit, debit, or health savings card. Not active on [employer]? Ask us how to get signed up today! If you receive a survey from [employer], please take a few minutes to complete it and provide feedback.  We strive to deliver the best patient experience and are looking for ways to make improvements. Your feedback will help us do so. For more information on [employer], please visit [email or web address]           No text in SmartText           No text in SmartText

## (undated) NOTE — ED AVS SNAPSHOT
Zak Gallego   MRN: [id number]    Department:  [facility] Emergency Department   Date of Visit:  1/9/2020           Disclosure     Insurance plans vary and the physician(s) referred by the ER may not be covered by your plan.  Please contact tell this physician (or your personal doctor if your instructions are to return to your personal doctor) about any new or lasting problems. The primary care or specialist physician will see patients referred from the [facility] Emergency Department.  Donnell Bailey